# Patient Record
Sex: MALE | Race: WHITE | URBAN - METROPOLITAN AREA
[De-identification: names, ages, dates, MRNs, and addresses within clinical notes are randomized per-mention and may not be internally consistent; named-entity substitution may affect disease eponyms.]

---

## 2017-11-28 ENCOUNTER — APPOINTMENT (RX ONLY)
Dept: URBAN - METROPOLITAN AREA CLINIC 349 | Facility: CLINIC | Age: 72
Setting detail: DERMATOLOGY
End: 2017-11-28

## 2017-11-28 DIAGNOSIS — L57.0 ACTINIC KERATOSIS: ICD-10-CM

## 2017-11-28 DIAGNOSIS — D22 MELANOCYTIC NEVI: ICD-10-CM

## 2017-11-28 DIAGNOSIS — L82.0 INFLAMED SEBORRHEIC KERATOSIS: ICD-10-CM

## 2017-11-28 PROBLEM — D22.62 MELANOCYTIC NEVI OF LEFT UPPER LIMB, INCLUDING SHOULDER: Status: ACTIVE | Noted: 2017-11-28

## 2017-11-28 PROBLEM — D22.5 MELANOCYTIC NEVI OF TRUNK: Status: ACTIVE | Noted: 2017-11-28

## 2017-11-28 PROBLEM — D22.71 MELANOCYTIC NEVI OF RIGHT LOWER LIMB, INCLUDING HIP: Status: ACTIVE | Noted: 2017-11-28

## 2017-11-28 PROBLEM — D22.72 MELANOCYTIC NEVI OF LEFT LOWER LIMB, INCLUDING HIP: Status: ACTIVE | Noted: 2017-11-28

## 2017-11-28 PROBLEM — D22.61 MELANOCYTIC NEVI OF RIGHT UPPER LIMB, INCLUDING SHOULDER: Status: ACTIVE | Noted: 2017-11-28

## 2017-11-28 PROCEDURE — ? SHAVE REMOVAL

## 2017-11-28 PROCEDURE — 17110 DESTRUCTION B9 LES UP TO 14: CPT

## 2017-11-28 PROCEDURE — A4550 SURGICAL TRAYS: HCPCS

## 2017-11-28 PROCEDURE — ? COUNSELING

## 2017-11-28 PROCEDURE — ? PATHOLOGY BILLING

## 2017-11-28 PROCEDURE — ? LIQUID NITROGEN

## 2017-11-28 PROCEDURE — 17000 DESTRUCT PREMALG LESION: CPT | Mod: 59

## 2017-11-28 PROCEDURE — 11302 SHAVE SKIN LESION 1.1-2.0 CM: CPT | Mod: 59

## 2017-11-28 PROCEDURE — 17003 DESTRUCT PREMALG LES 2-14: CPT

## 2017-11-28 PROCEDURE — 88305 TISSUE EXAM BY PATHOLOGIST: CPT

## 2017-11-28 PROCEDURE — 99202 OFFICE O/P NEW SF 15 MIN: CPT | Mod: 25

## 2017-11-28 ASSESSMENT — LOCATION SIMPLE DESCRIPTION DERM
LOCATION SIMPLE: LEFT POSTERIOR THIGH
LOCATION SIMPLE: RIGHT CHEEK
LOCATION SIMPLE: RIGHT UPPER BACK
LOCATION SIMPLE: LEFT OCCIPITAL SCALP
LOCATION SIMPLE: RIGHT EAR
LOCATION SIMPLE: RIGHT POSTERIOR THIGH
LOCATION SIMPLE: LEFT SCALP
LOCATION SIMPLE: LEFT FOREARM
LOCATION SIMPLE: RIGHT FOREARM
LOCATION SIMPLE: ABDOMEN
LOCATION SIMPLE: SCALP

## 2017-11-28 ASSESSMENT — LOCATION DETAILED DESCRIPTION DERM
LOCATION DETAILED: RIGHT INFERIOR HELIX
LOCATION DETAILED: RIGHT VENTRAL PROXIMAL FOREARM
LOCATION DETAILED: RIGHT DISTAL POSTERIOR THIGH
LOCATION DETAILED: RIGHT LATERAL ABDOMEN
LOCATION DETAILED: LEFT SUPERIOR PARIETAL SCALP
LOCATION DETAILED: RIGHT SUPERIOR MEDIAL UPPER BACK
LOCATION DETAILED: LEFT MEDIAL FRONTAL SCALP
LOCATION DETAILED: RIGHT SUPERIOR PARIETAL SCALP
LOCATION DETAILED: LEFT PROXIMAL DORSAL FOREARM
LOCATION DETAILED: LEFT PROXIMAL POSTERIOR THIGH
LOCATION DETAILED: LEFT SUPERIOR OCCIPITAL SCALP
LOCATION DETAILED: RIGHT CENTRAL MALAR CHEEK
LOCATION DETAILED: RIGHT PROXIMAL DORSAL FOREARM

## 2017-11-28 ASSESSMENT — LOCATION ZONE DERM
LOCATION ZONE: LEG
LOCATION ZONE: ARM
LOCATION ZONE: SCALP
LOCATION ZONE: TRUNK
LOCATION ZONE: FACE
LOCATION ZONE: EAR

## 2017-11-28 NOTE — PROCEDURE: SHAVE REMOVAL
Accession #: TC ONLY
Notification Instructions: Patient will be notified of biopsy results. However, patient instructed to call the office if not contacted within 2 weeks.
Wound Care: Vaseline
Bill 82308 For Specimen Handling/Conveyance To Laboratory?: no
Consent was obtained from the patient. The risks and benefits to therapy were discussed in detail. Specifically, the risks of infection, scarring, bleeding, prolonged wound healing, incomplete removal, allergy to anesthesia, nerve injury and recurrence were addressed. Prior to the procedure, the treatment site was clearly identified and confirmed by the patient. All components of Universal Protocol/PAUSE Rule completed.
Billing Type: Third-Party Bill
Anesthesia Type: 1% lidocaine with epinephrine
Medical Necessity Information: It is in your best interest to select a reason for this procedure from the list below. All of these items fulfill various CMS LCD requirements except the new and changing color options.
Render Post-Care Instructions In Note?: yes
Medical Necessity Clause: This procedure was medically necessary because the lesion has a history of change
Detail Level: Detailed
Hemostasis: Aluminum Chloride
Size Of Margin In Cm (Margins Are Not Added To Billing Dimensions): -
Size Of Lesion In Cm (Required): 1.1
Post-Care Instructions: I reviewed with the patient in detail post-care instructions. Patient is to keep the biopsy site dry overnight, and then apply Vaseline daily until healed. Patient may apply hydrogen peroxide soaks to remove any crusting. After the procedure, the patient was oriented to person, place, and time. Patient denied feeling dizzy, queasy, and and declined further observation after initial 5 minute observation time.
Biopsy Method: Dermablade
X Size Of Lesion In Cm (Optional): 0
Anesthesia Volume In Cc: 0.4

## 2017-11-28 NOTE — PROCEDURE: LIQUID NITROGEN
Medical Necessity Clause: This procedure was medically necessary because the lesions that were treated were:
Include Z78.9 (Other Specified Conditions Influencing Health Status) As An Associated Diagnosis?: Yes
Post-Care Instructions: I reviewed with the patient in detail post-care instructions. Patient is to wear sunprotection, and avoid picking at any of the treated lesions. Pt may apply Vaseline to crusted or scabbing areas.
Number Of Freeze-Thaw Cycles: 2 freeze-thaw cycles
Consent: The patient's consent was obtained including but not limited to risks of crusting, scabbing, blistering, scarring, darker or lighter pigmentary change, recurrence, incomplete removal and infection.
Detail Level: Detailed
Add 52 Modifier (Optional): no
Duration Of Freeze Thaw-Cycle (Seconds): 3
Medical Necessity Information: It is in your best interest to select a reason for this procedure from the list below. All of these items fulfill various CMS LCD requirements except the new and changing color options.

## 2018-12-03 ENCOUNTER — HOSPITAL ENCOUNTER (OUTPATIENT)
Dept: LAB | Age: 73
Discharge: HOME | End: 2018-12-03

## 2018-12-03 PROCEDURE — 88305 TISSUE EXAM BY PATHOLOGIST: CPT

## 2020-02-19 ENCOUNTER — HOSPITAL ENCOUNTER (OUTPATIENT)
Dept: PHYSICAL THERAPY | Age: 75
Discharge: HOME | End: 2020-02-19
Payer: MEDICARE

## 2020-02-19 PROCEDURE — 97162 PT EVAL MOD COMPLEX 30 MIN: CPT

## 2020-02-19 PROCEDURE — 97110 THERAPEUTIC EXERCISES: CPT

## 2020-02-19 NOTE — PROGRESS NOTES
Ashley Reyes  : 1945  [place]  [place] [address]  [phone]   [phone]      OUTPATIENT PHYSICAL THERAPY: Daily Treatment Note 2020  Visit Count:  1    ICD-10: Treatment Diagnosis: Pain in right shoulder (M25.511) and Stiffness of right shoulder, not elsewhere classified (M25.611)  TREATMENT PLAN:  Effective Dates: 2020 TO 2020 (60 days). Frequency/Duration: 2 times a week for 60 Day(s)  GOALS: (Goals have been discussed and agreed upon with patient.)  Short-Term Functional Goals: Time Frame: 4 weeks  # Goal Status   1 Pt will confirm compliance with home program to facilitate improvement in function. NEW     Discharge goals: Time frame: 8 weeks   # Goal Status   1 Pt will be able to elevate UE to at least 135° without symptoms in order to participate in ADLs such as reaching overhead and pulling shirt on. NEW   2 Pt will be able to reach over head to touch superior angle of opposite scapula without symptoms to be able to participate in ADLs such as reaching overhead and washing head. NEW   3 Pt will be able to reach across body to touch spine of opposite scapula without symptoms to be able to participate in ADLs such as reaching tasks. NEW   4 Pt will be able to reach behind back to touch TLJ without symptoms to be able to participate in dressing and toileting activities. NEW   5 Pt will be able to reach behind themselves with arm elevated without symptoms to be able to participate in ADLs such as reaching tasks and grabbing seat belt. NEW       Pre-treatment Symptoms/Complaints:  Pt reports no pain at rest, just with reaching. Pain: Initial:   0/10 Post Session:  0/10   Medications Last Reviewed: 2020  Updated Objective Findings: Below measures from initial evaluation unless otherwise noted.   20  Pain at worst: 8/10  ROM: Left Right   Elevation (°) 152 112 with pain   Behind neck reach To opposite scapula To back of neck   Behind back reach To TLJ To posterior hip    Cross body reach To opposite scapula To ACJ   IR in abduction 70 50 with pain   ER in abduction 85 30 with pain     UE ANDT: All within normal limits B  Strength: 5/5 throughout L. 5/5 on R except 4/5 with ER, IR, and abduction with pain. Palpation: TTP throughout supraspinatus and infraspinatus to lateral shoulder insertion. TREATMENT:   THERAPEUTIC ACTIVITY: (see chart for minutes): Therapeutic activities per grid below to improve mobility, strength, balance and coordination. Required minimal visual, verbal and tactile cues to improve independence with functional mobility and activities. THERAPEUTIC EXERCISE: (see chart for minutes):  Exercises per grid below to improve mobility, strength, balance and coordination. Required minimal visual, verbal and tactile cues to promote proper body alignment and promote proper body mechanics. Progressed resistance, range, repetitions and complexity of movement as indicated. NEUROMUSCULAR RE-EDUCATION: (see chart for minutes):  Exercise/activities per grid below to improve balance, coordination, kinesthetic sense, posture, pain, and proprioception. Required minimal visual, verbal and tactile cues to promote motor control of right, upper extremity(s). MANUAL THERAPY: (see chart for minutes): Joint mobilization, Soft tissue mobilization, skin mobilization, and muscle energy techniques were utilized and necessary because of the patient's restricted joint motion, restricted motion of soft tissue and pain . MODALITIES: (see chart for minutes):      *  Cold Pack Therapy in order to provide analgesia. Date: 2/19/20 (visit 1)       Modalities:                Therapeutic Exercise: 23 min        Table stretch: x3 holds  S/L ER: x5 R  S/L flexion: x5 R   Pt educated on home program implementation and given printout. Neuromuscular re-education                Manual Therapy:                Therapeutic Activities:                  Home program: 2/19/20: table stretch, S/L ER and flexion    MedBridge Portal  Treatment/Session Summary:    · Response to Treatment: Pt tolerated exercises well. He reported he would try out exercises and see how they did. · Communication/Consultation:  None today  · Equipment provided today:  None today  · Recommendations/Intent for next treatment session: Next visit will focus on strengthening R shoulder and improving ROM.     Total Treatment Billable Duration:  33 minutes  PT Patient Time In/Time Out  Time In: 5951  Time Out: [address] [facility] Kely Bowers, PT, DPT    Future Appointments   Date Time Provider Arpit [facility]ails   2/21/2020 11:45 AM Alvia Clemente [facility]   2/24/2020 11:00 AM Alvia Clemente [facility]   2/27/2020 10:15 AM Alvia Clemente [facility]   3/2/2020 11:00 AM Alvia Clemente [facility]   3/4/2020 11:00 AM Alvia Clemente [facility]   3/9/2020 11:00 AM Alvia Clemente [facility]   3/11/2020 11:00 AM Alvia Clemente [facility]   3/16/2020 11:00 AM Alvia Clemente [facility]   3/18/2020 11:00 AM Alvia Clemente [facility]   3/23/2020 11:00 AM Alvia Clemente [facility]   3/25/2020 11:00 AM Alvia Clemente [facility]   3/30/2020 11:00 AM Alvia Clemente [facility]

## 2020-02-19 NOTE — THERAPY EVALUATION
Soni Hernández  : 1945  [place]  [place] [address]  [phone]   [phone]       OUTPATIENT PHYSICAL THERAPY:Initial Assessment 2020   ICD-10: Treatment Diagnosis: Pain in right shoulder (M25.511) and Stiffness of right shoulder, not elsewhere classified (M25.611)  Precautions/Allergies:   Patient has no allergy information on record. Ambulatory/Rehab Services H2 Model Falls Risk Assessment    Risk Factors:       (1)  Gender [Male] Ability to Rise from Chair:       (0)  Ability to rise in a single movement    Falls Prevention Plan:       No modifications necessary   Total: (5 or greater = High Risk): 1     [facility] [employer]. All Rights Reserved. [facility] tadoÂ° Patent #[id number]. Federal Law prohibits the replication, distribution or use without written permission from [employer]      MEDICAL/REFERRING DIAGNOSIS:  Pain in right shoulder [M25.511]   DATE OF ONSET: Over 6 months ago  REFERRING PHYSICIAN: Julius Chen MD MD Orders: evaluate and treat  Return MD Appointment: 3/10/20   INITIAL ASSESSMENT:  Mr. Janice Harding presents with impaired strength, ROM and pain of right shoulder . This limits reaching and lifting tolerance and restricts ability to participate in ADLs and functional mobility. . Patient would benefit from skilled physical therapy to address above impairments. PROBLEM LIST (Impacting functional limitations):  1. Decreased Strength  2. Decreased ADL/Functional Activities  3. Increased Pain  4. Decreased Activity Tolerance  5. Decreased Flexibility/Joint Mobility INTERVENTIONS PLANNED: (Treatment may consist of any combination of the following)  1. Cryotherapy  2. Electrical Stimulation  3. Heat  4. Home Exercise Program (HEP)  5. Manual Therapy  6. Neuromuscular Re-education/Strengthening  7.  Therapeutic Activites  8. Therapeutic Exercise/Strengthening   TREATMENT PLAN:  Effective Dates: 2/19/2020 TO 4/19/2020 (60 days). Frequency/Duration: 2 times a week for 60 Day(s)  GOALS: (Goals have been discussed and agreed upon with patient.)  Short-Term Functional Goals: Time Frame: 4 weeks  # Goal Status   1 Pt will confirm compliance with home program to facilitate improvement in function. NEW     Discharge goals: Time frame: 8 weeks   # Goal Status   1 Pt will be able to elevate UE to at least 135° without symptoms in order to participate in ADLs such as reaching overhead and pulling shirt on. NEW   2 Pt will be able to reach over head to touch superior angle of opposite scapula without symptoms to be able to participate in ADLs such as reaching overhead and washing head. NEW   3 Pt will be able to reach across body to touch spine of opposite scapula without symptoms to be able to participate in ADLs such as reaching tasks. NEW   4 Pt will be able to reach behind back to touch TLJ without symptoms to be able to participate in dressing and toileting activities. NEW   5 Pt will be able to reach behind themselves with arm elevated without symptoms to be able to participate in ADLs such as reaching tasks and grabbing seat belt. NEW       Rehabilitation Potential For Stated Goals: Good  Regarding Clint Santana's therapy, I certify that the treatment plan above will be carried out by a therapist or under their direction. Thank you for this referral,  Constantino Gilse, PT, DPT     Referring Physician Signature: Saira Amato MD _______________________________ Date _____________     HISTORY:   History of Injury/Illness (Reason for Referral):  Pt reports he has no TREY, his R shoulder just gradually started hurting. He reports it hurts with reaching overhead and behind back or head and lying on it at night. He would like to return to golfing and swimming.   Past Medical History/Comorbidities:   Mr. Joo Bronson  has no past medical history on file. Mr. Talisha Arnold  has no past surgical history on file. Hand surgery, hernia repair. Pre-diabetes. Social History/Living Environment:     Pt lives with spouse/significant other in two-story home with stairs in home and walk-in shower. Prior Level of Function/Work/Activity:  Pt does not work. Pt had unrestricted prior level of function. Current Medications:     No current outpatient medications on file. See list in paper chart. Date Last Reviewed:  2/19/2020    Number of Personal Factors/Comorbidities that affect the Plan of Care: 1-2: MODERATE COMPLEXITY   EXAMINATION:   Pain at worst: 8/10  ROM:    Left Right   Elevation (°) 152 112 with pain   Behind neck reach To opposite scapula To back of neck   Behind back reach To TLJ To posterior hip    Cross body reach To opposite scapula To ACJ   IR in abduction 70 50 with pain   ER in abduction 85 30 with pain     UE ANDT: All within normal limits B  Strength: 5/5 throughout L. 5/5 on R except 4/5 with ER, IR, and abduction with pain. Palpation: TTP throughout supraspinatus and infraspinatus to lateral shoulder insertion. Body Structures Involved:  1. Nerves  2. Joints  3. Muscles Body Functions Affected:  1. Sensory/Pain  2. Neuromusculoskeletal  3. Movement Related Activities and Participation Affected:  1. General Tasks and Demands  2. Self Care  3. Community, Social and [place]   Number of elements (examined above) that affect the Plan of Care: 3: MODERATE COMPLEXITY   CLINICAL PRESENTATION:   Presentation: Evolving clinical presentation with changing clinical characteristics: MODERATE COMPLEXITY     CLINICAL DECISION MAKING:      OUTCOME MEASURE:   Initial outcome measure performed on 2/19/2020. Tool Used: Disabilities of the Arm, Shoulder and Hand (DASH) Questionnaire - Quick Version  Score:  Initial: 27/55  Most Recent: X/55 (Date: -- )   Interpretation of Score:  The DASH is designed to measure the activities of daily living in person's with upper extremity dysfunction or pain. Each section is scored on a 1-5 scale, 5 representing the greatest disability. The scores of each section are added together for a total score of 55. MEDICAL NECESSITY:   · Patient will benefit from skilled physical therapy to address their previously listed impairments in order to improve their independence with functional activities painfree. REASON FOR SERVICES/OTHER COMMENTS:  · Patient requires present interventions due to patient's inability to perform functional and recreational activities painfree.    Use of outcome tool(s) and clinical judgement create a POC that gives a: Questionable prediction of patient's progress: MODERATE COMPLEXITY        Total Duration: 33 min  PT Patient Time In/Time Out  Time In: 1530  Time Out: [address] [place] Antonio Garcia, PT, DPT

## 2020-02-21 ENCOUNTER — HOSPITAL ENCOUNTER (OUTPATIENT)
Dept: PHYSICAL THERAPY | Age: 75
Discharge: HOME | End: 2020-02-21
Payer: MEDICARE

## 2020-02-21 PROCEDURE — 97110 THERAPEUTIC EXERCISES: CPT

## 2020-02-21 NOTE — PROGRESS NOTES
Orion Weiss  : 1945  [place]  [place] [address] - [facility] [address] @ [address], [address] [place] [address]  [phone]   [phone]      OUTPATIENT PHYSICAL THERAPY: Daily Treatment Note 2020  Visit Count:  2    ICD-10: Treatment Diagnosis: Pain in right shoulder (M25.511) and Stiffness of right shoulder, not elsewhere classified (M25.611)  TREATMENT PLAN:  Effective Dates: 2020 TO 2020 (60 days). Frequency/Duration: 2 times a week for 60 Day(s)  GOALS: (Goals have been discussed and agreed upon with patient.)  Short-Term Functional Goals: Time Frame: 4 weeks  # Goal Status   1 Pt will confirm compliance with home program to facilitate improvement in function. NEW     Discharge goals: Time frame: 8 weeks   # Goal Status   1 Pt will be able to elevate UE to at least 135° without symptoms in order to participate in ADLs such as reaching overhead and pulling shirt on. NEW   2 Pt will be able to reach over head to touch superior angle of opposite scapula without symptoms to be able to participate in ADLs such as reaching overhead and washing head. NEW   3 Pt will be able to reach across body to touch spine of opposite scapula without symptoms to be able to participate in ADLs such as reaching tasks. NEW   4 Pt will be able to reach behind back to touch TLJ without symptoms to be able to participate in dressing and toileting activities. NEW   5 Pt will be able to reach behind themselves with arm elevated without symptoms to be able to participate in ADLs such as reaching tasks and grabbing seat belt. NEW       Pre-treatment Symptoms/Complaints:  Pt reports his shoulder doesn't hurt at rest. He reports no significant change yet and that it might be a little bit better.    Pain: Initial:   0/10 Post Session:  0/10   Medications Last Reviewed: 2020  Updated Objective Findings: Below measures from initial evaluation unless otherwise noted. 2/19/20  Pain at worst: 8/10  ROM:    Left Right   Elevation (°) 152 112 with pain   Behind neck reach To opposite scapula To back of neck   Behind back reach To TLJ To posterior hip    Cross body reach To opposite scapula To ACJ   IR in abduction 70 50 with pain   ER in abduction 85 30 with pain     UE ANDT: All within normal limits B  Strength: 5/5 throughout L. 5/5 on R except 4/5 with ER, IR, and abduction with pain. Palpation: TTP throughout supraspinatus and infraspinatus to lateral shoulder insertion. TREATMENT:   THERAPEUTIC ACTIVITY: (see chart for minutes): Therapeutic activities per grid below to improve mobility, strength, balance and coordination. Required minimal visual, verbal and tactile cues to improve independence with functional mobility and activities. THERAPEUTIC EXERCISE: (see chart for minutes):  Exercises per grid below to improve mobility, strength, balance and coordination. Required minimal visual, verbal and tactile cues to promote proper body alignment and promote proper body mechanics. Progressed resistance, range, repetitions and complexity of movement as indicated. NEUROMUSCULAR RE-EDUCATION: (see chart for minutes):  Exercise/activities per grid below to improve balance, coordination, kinesthetic sense, posture, pain, and proprioception. Required minimal visual, verbal and tactile cues to promote motor control of right, upper extremity(s). MANUAL THERAPY: (see chart for minutes): Joint mobilization, Soft tissue mobilization, skin mobilization, and muscle energy techniques were utilized and necessary because of the patient's restricted joint motion, restricted motion of soft tissue and pain . MODALITIES: (see chart for minutes):      *  Cold Pack Therapy in order to provide analgesia.      Date: 2/19/20 (visit 1) 2/21/20 (viist 2)       Modalities:                Therapeutic Exercise: 23 min 41 min       Table stretch: x3 holds  S/L ER: x5 R  S/L flexion: x5 R   Pt educated on home program implementation and given printout. UBE: 3'/3' L4  S/L R ER: x10, 2x10x1#  S/L R flexion: x10, 2x10x1#  S/L R abduction: x10, 2x10x1#  Prone row, I, Y: 2x10 R each  Band ER: 3x10 red  Band IR: 3x10 grey  Band row, press, extension: 3x10 grey each R  Biceps curl: 10x2#, 10x3# R all 3  each set  Triceps extension: [id number]# R  Table stretch: x10  Wall stretch: x5, pt reported painful so discontinued       Neuromuscular re-education                Manual Therapy:                Therapeutic Activities:                  Home program: 2/19/20: table stretch, S/L ER and flexion    MedBridge Portal  Treatment/Session Summary:    · Response to Treatment: Pt tolerated exercises well. He reported some of them he could feel discomfort in shoulder but it went away after set finished. · Communication/Consultation:  None today  · Equipment provided today:  None today  · Recommendations/Intent for next treatment session: Next visit will focus on strengthening R shoulder and improving ROM.     Total Treatment Billable Duration:  41 minutes  PT Patient Time In/Time Out  Time In: 6645  Time Out: [address] [facility] [address], PT, DPT    Future Appointments   Date Time Provider Arpit Nails   2/24/2020 11:00 AM Jearl Jose [facility]   2/27/2020 10:15 AM Jearl Oz [facility]   3/2/2020 11:00 AM Jearl Oz [facility]   3/4/2020 11:00 AM Jearl Oz [facility]   3/9/2020 11:00 AM Jearl Oz [facility]   3/11/2020 11:00 AM Jearl Oz [facility]   3/16/2020 11:00 AM Jearl Oz [facility]   3/18/2020 11:00 AM Jearl Oz [facility]   3/23/2020 11:00 AM Jearl Oz [facility]   3/25/2020 11:00 AM Jearl Oz [facility]   3/30/2020 11:00 AM Jearl Oz [facility]

## 2020-02-24 ENCOUNTER — HOSPITAL ENCOUNTER (OUTPATIENT)
Dept: PHYSICAL THERAPY | Age: 75
Discharge: HOME | End: 2020-02-24
Payer: MEDICARE

## 2020-02-24 PROCEDURE — 97110 THERAPEUTIC EXERCISES: CPT

## 2020-02-24 NOTE — PROGRESS NOTES
Nina Escobar  : 1945  [place]  [place]: [place] [address] @ [address] MARIE Byrne.  [phone]   [phone]      OUTPATIENT PHYSICAL THERAPY: Daily Treatment Note 2020  Visit Count:  3    ICD-10: Treatment Diagnosis: Pain in right shoulder (M25.511) and Stiffness of right shoulder, not elsewhere classified (M25.611)  TREATMENT PLAN:  Effective Dates: 2020 TO 2020 (60 days). Frequency/Duration: 2 times a week for 60 Day(s)  GOALS: (Goals have been discussed and agreed upon with patient.)  Short-Term Functional Goals: Time Frame: 4 weeks  # Goal Status   1 Pt will confirm compliance with home program to facilitate improvement in function. NEW     Discharge goals: Time frame: 8 weeks   # Goal Status   1 Pt will be able to elevate UE to at least 135° without symptoms in order to participate in ADLs such as reaching overhead and pulling shirt on. NEW   2 Pt will be able to reach over head to touch superior angle of opposite scapula without symptoms to be able to participate in ADLs such as reaching overhead and washing head. NEW   3 Pt will be able to reach across body to touch spine of opposite scapula without symptoms to be able to participate in ADLs such as reaching tasks. NEW   4 Pt will be able to reach behind back to touch TLJ without symptoms to be able to participate in dressing and toileting activities. NEW   5 Pt will be able to reach behind themselves with arm elevated without symptoms to be able to participate in ADLs such as reaching tasks and grabbing seat belt. NEW       Pre-treatment Symptoms/Complaints:  Pt reports no pain at rest, but pain with certain reaching motions still.    Pain: Initial:   0/10 Post Session:  0/10   Medications Last Reviewed: 2020  Updated Objective Findings: Below measures from initial evaluation unless otherwise noted.  2/19/20  Pain at worst: 8/10  ROM:    Left Right   Elevation (°) 152 112 with pain   Behind neck reach To opposite scapula To back of neck   Behind back reach To TLJ To posterior hip    Cross body reach To opposite scapula To ACJ   IR in abduction 70 50 with pain   ER in abduction 85 30 with pain     UE ANDT: All within normal limits B  Strength: 5/5 throughout L. 5/5 on R except 4/5 with ER, IR, and abduction with pain. Palpation: TTP throughout supraspinatus and infraspinatus to lateral shoulder insertion. TREATMENT:   THERAPEUTIC ACTIVITY: (see chart for minutes): Therapeutic activities per grid below to improve mobility, strength, balance and coordination. Required minimal visual, verbal and tactile cues to improve independence with functional mobility and activities. THERAPEUTIC EXERCISE: (see chart for minutes):  Exercises per grid below to improve mobility, strength, balance and coordination. Required minimal visual, verbal and tactile cues to promote proper body alignment and promote proper body mechanics. Progressed resistance, range, repetitions and complexity of movement as indicated. NEUROMUSCULAR RE-EDUCATION: (see chart for minutes):  Exercise/activities per grid below to improve balance, coordination, kinesthetic sense, posture, pain, and proprioception. Required minimal visual, verbal and tactile cues to promote motor control of right, upper extremity(s). MANUAL THERAPY: (see chart for minutes): Joint mobilization, Soft tissue mobilization, skin mobilization, and muscle energy techniques were utilized and necessary because of the patient's restricted joint motion, restricted motion of soft tissue and pain . MODALITIES: (see chart for minutes):      *  Cold Pack Therapy in order to provide analgesia.      Date: 2/19/20 (visit 1) 2/21/20 (viist 2)  2/24/20 (visit 3)      Modalities:                Therapeutic Exercise: 23 min 41 min 45 min      Table stretch: x3 holds  S/L ER: x5 R  S/L flexion: x5 R   Pt educated on home program implementation and given printout. UBE: 3'/3' L4  S/L R ER: x10, 2x10x1#  S/L R flexion: x10, 2x10x1#  S/L R abduction: x10, 2x10x1#  Prone row, I, Y: 2x10 R each  Band ER: 3x10 red  Band IR: 3x10 grey  Band row, press, extension: 3x10 grey each R  Biceps curl: 10x2#, 10x3# R all 3  each set  Triceps extension: 8l26s16# R  Table stretch: x10  Wall stretch: x5, pt reported painful so discontinued  UBE: 3'/3' L4  S/L R ER: 10x1#, 2x10x2#  S/L R flexion: 10x1#, 2x10x2#  S/L R abduction: 10x1#, 2x10x2#  Prone T, Y: 3x10 R each  Band ER: 3x10 red  Band IR, row, extension: 3x10 grey  Biceps curl: 10x3#, 2x10x4# B all 3  each set  Triceps extension: 3t79d36# B  PROM stretching into IR, ER in adduction and abduction: 5'      Neuromuscular re-education                Manual Therapy:                Therapeutic Activities:                  Home program: 2/19/20: table stretch, S/L ER and flexion    [employer] Portal  Treatment/Session Summary:    · Response to Treatment: Pt tolerated increased volume and resistance well today. He continues to have pain with reaching motions that take him behind body or into end range ER. · Communication/Consultation:  None today  · Equipment provided today:  None today  · Recommendations/Intent for next treatment session: Next visit will focus on strengthening R shoulder and improving ROM.     Total Treatment Billable Duration:  45 minutes  PT Patient Time In/Time Out  Time In: 1100  Time Out: 18[address] [facility] Thierry Cantu PT, DPT    Future Appointments   Date Time Provider Arpit Nails   2/27/2020 10:15 AM Elijah Urbina [facility]   3/2/2020 11:00 AM Elijah FARRELL [facility]   3/4/2020 11:00 AM Elijah FARRELL [facility]   3/9/2020 11:00 AM Elijah FARRELL [facility]   3/11/2020 11:00 AM Elijah [facility]   3/16/2020 11:00 AM Elijah [facility]LL [facility]   3/18/2020 11:00 AM Elijah [facility]LL [facility]   3/23/2020 11:00 AM Magalys Denis [facility]   3/25/2020 11:00 AM Magalys Denis [facility]   3/30/2020 11:00 AM Magalys Denis [facility]

## 2020-02-27 ENCOUNTER — HOSPITAL ENCOUNTER (OUTPATIENT)
Dept: PHYSICAL THERAPY | Age: 75
Discharge: HOME | End: 2020-02-27
Payer: MEDICARE

## 2020-02-27 PROCEDURE — 97110 THERAPEUTIC EXERCISES: CPT

## 2020-02-27 NOTE — PROGRESS NOTES
Nina Escobar  : 1945  [place]  [place] [address] @ [address], [place] Byrne.  [phone]   [phone]      OUTPATIENT PHYSICAL THERAPY: Daily Treatment Note 2020  Visit Count:  4    ICD-10: Treatment Diagnosis: Pain in right shoulder (M25.511) and Stiffness of right shoulder, not elsewhere classified (M25.611)  TREATMENT PLAN:  Effective Dates: 2020 TO 2020 (60 days). Frequency/Duration: 2 times a week for 60 Day(s)  GOALS: (Goals have been discussed and agreed upon with patient.)  Short-Term Functional Goals: Time Frame: 4 weeks  # Goal Status   1 Pt will confirm compliance with home program to facilitate improvement in function. NEW     Discharge goals: Time frame: 8 weeks   # Goal Status   1 Pt will be able to elevate UE to at least 135° without symptoms in order to participate in ADLs such as reaching overhead and pulling shirt on. NEW   2 Pt will be able to reach over head to touch superior angle of opposite scapula without symptoms to be able to participate in ADLs such as reaching overhead and washing head. NEW   3 Pt will be able to reach across body to touch spine of opposite scapula without symptoms to be able to participate in ADLs such as reaching tasks. NEW   4 Pt will be able to reach behind back to touch TLJ without symptoms to be able to participate in dressing and toileting activities. NEW   5 Pt will be able to reach behind themselves with arm elevated without symptoms to be able to participate in ADLs such as reaching tasks and grabbing seat belt. NEW       Pre-treatment Symptoms/Complaints:  Pt reports his shoulder doesn't hurt at rest but any reaching behind hurts. He reports he is sleeping slightly better now.    Pain: Initial:   0/10 Post Session:  0/10   Medications Last Reviewed: 2020  Updated Objective Findings: Below measures from initial evaluation unless otherwise noted. 2/19/20  Pain at worst: 8/10  ROM:    Left Right   Elevation (°) 152 112 with pain   Behind neck reach To opposite scapula To back of neck   Behind back reach To TLJ To posterior hip    Cross body reach To opposite scapula To ACJ   IR in abduction 70 50 with pain   ER in abduction 85 30 with pain     UE ANDT: All within normal limits B  Strength: 5/5 throughout L. 5/5 on R except 4/5 with ER, IR, and abduction with pain. Palpation: TTP throughout supraspinatus and infraspinatus to lateral shoulder insertion. TREATMENT:   THERAPEUTIC ACTIVITY: (see chart for minutes): Therapeutic activities per grid below to improve mobility, strength, balance and coordination. Required minimal visual, verbal and tactile cues to improve independence with functional mobility and activities. THERAPEUTIC EXERCISE: (see chart for minutes):  Exercises per grid below to improve mobility, strength, balance and coordination. Required minimal visual, verbal and tactile cues to promote proper body alignment and promote proper body mechanics. Progressed resistance, range, repetitions and complexity of movement as indicated. NEUROMUSCULAR RE-EDUCATION: (see chart for minutes):  Exercise/activities per grid below to improve balance, coordination, kinesthetic sense, posture, pain, and proprioception. Required minimal visual, verbal and tactile cues to promote motor control of right, upper extremity(s). MANUAL THERAPY: (see chart for minutes): Joint mobilization, Soft tissue mobilization, skin mobilization, and muscle energy techniques were utilized and necessary because of the patient's restricted joint motion, restricted motion of soft tissue and pain . MODALITIES: (see chart for minutes):      *  Cold Pack Therapy in order to provide analgesia.      Date: 2/19/20 (visit 1) 2/21/20 (viist 2)  2/24/20 (visit 3)  2/27/20 (visit 4)     Modalities:                Therapeutic Exercise: 23 min 41 min 45 min 45 min     Table stretch: x3 holds  S/L ER: x5 R  S/L flexion: x5 R   Pt educated on home program implementation and given printout. UBE: 3'/3' L4  S/L R ER: x10, 2x10x1#  S/L R flexion: x10, 2x10x1#  S/L R abduction: x10, 2x10x1#  Prone row, I, Y: 2x10 R each  Band ER: 3x10 red  Band IR: 3x10 grey  Band row, press, extension: 3x10 grey each R  Biceps curl: 10x2#, 10x3# R all 3  each set  Triceps extension: 3l31a35# R  Table stretch: x10  Wall stretch: x5, pt reported painful so discontinued  UBE: 3'/3' L4  S/L R ER: 10x1#, 2x10x2#  S/L R flexion: 10x1#, 2x10x2#  S/L R abduction: 10x1#, 2x10x2#  Prone T, Y: 3x10 R each  Band ER: 3x10 red  Band IR, row, extension: 3x10 grey  Biceps curl: 10x3#, 2x10x4# B all 3  each set  Triceps extension: 0o29w35# B  PROM stretching into IR, ER in adduction and abduction: 5'  UBE: 3'/3' L4  S/L R ER: 3x10x2#  S/L R flexion: 3x10x2#  S/L R abduction: 3x10x2#  Prone T, Y: 2x10 R each  Band ER: 3x10 red  Band IR, row, extension: 3x10 grey  Wall wash: 3x10  Flexion to 90 °: 3x10x1# B  Biceps curl: 2x10x4# B all 3  each set  Triceps extension: 2w53q31# B  D2 flexion: 2x5 red B  D2 extension: 2x5x15# B     Neuromuscular re-education                Manual Therapy:                Therapeutic Activities:                  Home program: 2/19/20: table stretch, S/L ER and flexion    [employer] Portal  Treatment/Session Summary:    · Response to Treatment: Pt continues to progress well and tolerated new exercises well. · Communication/Consultation:  None today  · Equipment provided today:  None today  · Recommendations/Intent for next treatment session: Next visit will focus on strengthening R shoulder and improving ROM.     Total Treatment Billable Duration:  45 minutes  PT Patient Time In/Time Out  Time In: 1015  Time Out: [address], PT, DPT    Future Appointments   Date Time Provider Arpit Nails   3/2/2020 11:00 AM Srikanth Dorman [facility]   3/4/2020 11:00 AM Alexandro Offer [facility]   3/9/2020 11:00 AM Alexandro Offer [facility]   3/11/2020 11:00 AM Alexandro Offer [facility]   3/16/2020 11:00 AM Alexandro Offer [facility]   3/18/2020 11:00 AM Alexandro Offer [facility]   3/23/2020 11:00 AM Alexandro Offer [facility]   3/25/2020 11:00 AM Alexandro Offer [facility]   3/30/2020 11:00 AM Alexandro Offer [facility]

## 2020-03-02 ENCOUNTER — HOSPITAL ENCOUNTER (OUTPATIENT)
Dept: PHYSICAL THERAPY | Age: 75
Discharge: HOME | End: 2020-03-02
Payer: MEDICARE

## 2020-03-02 PROCEDURE — 97110 THERAPEUTIC EXERCISES: CPT

## 2020-03-02 NOTE — PROGRESS NOTES
Robin Chawla  : 1945  [place]  [place] [address]  [phone]   [phone]      OUTPATIENT PHYSICAL THERAPY: Daily Treatment Note 3/2/2020  Visit Count:  5    ICD-10: Treatment Diagnosis: Pain in right shoulder (M25.511) and Stiffness of right shoulder, not elsewhere classified (M25.611)  TREATMENT PLAN:  Effective Dates: 2020 TO 2020 (60 days). Frequency/Duration: 2 times a week for 60 Day(s)  GOALS: (Goals have been discussed and agreed upon with patient.)  Short-Term Functional Goals: Time Frame: 4 weeks  # Goal Status   1 Pt will confirm compliance with home program to facilitate improvement in function. NEW     Discharge goals: Time frame: 8 weeks   # Goal Status   1 Pt will be able to elevate UE to at least 135° without symptoms in order to participate in ADLs such as reaching overhead and pulling shirt on. NEW   2 Pt will be able to reach over head to touch superior angle of opposite scapula without symptoms to be able to participate in ADLs such as reaching overhead and washing head. NEW   3 Pt will be able to reach across body to touch spine of opposite scapula without symptoms to be able to participate in ADLs such as reaching tasks. NEW   4 Pt will be able to reach behind back to touch TLJ without symptoms to be able to participate in dressing and toileting activities. NEW   5 Pt will be able to reach behind themselves with arm elevated without symptoms to be able to participate in ADLs such as reaching tasks and grabbing seat belt. NEW       Pre-treatment Symptoms/Complaints:  Pt reports reaching forward generally doesn't give him trouble but reaching behind body is still painful.    Pain: Initial:   0/10 Post Session:  0/10   Medications Last Reviewed: 3/2/2020  Updated Objective Findings: Below measures from initial evaluation unless otherwise noted. 2/19/20  Pain at worst: 8/10  ROM:    Left Right   Elevation (°) 152 112 with pain   Behind neck reach To opposite scapula To back of neck   Behind back reach To TLJ To posterior hip    Cross body reach To opposite scapula To ACJ   IR in abduction 70 50 with pain   ER in abduction 85 30 with pain     UE ANDT: All within normal limits B  Strength: 5/5 throughout L. 5/5 on R except 4/5 with ER, IR, and abduction with pain. Palpation: TTP throughout supraspinatus and infraspinatus to lateral shoulder insertion. TREATMENT:   THERAPEUTIC ACTIVITY: (see chart for minutes): Therapeutic activities per grid below to improve mobility, strength, balance and coordination. Required minimal visual, verbal and tactile cues to improve independence with functional mobility and activities. THERAPEUTIC EXERCISE: (see chart for minutes):  Exercises per grid below to improve mobility, strength, balance and coordination. Required minimal visual, verbal and tactile cues to promote proper body alignment and promote proper body mechanics. Progressed resistance, range, repetitions and complexity of movement as indicated. NEUROMUSCULAR RE-EDUCATION: (see chart for minutes):  Exercise/activities per grid below to improve balance, coordination, kinesthetic sense, posture, pain, and proprioception. Required minimal visual, verbal and tactile cues to promote motor control of right, upper extremity(s). MANUAL THERAPY: (see chart for minutes): Joint mobilization, Soft tissue mobilization, skin mobilization, and muscle energy techniques were utilized and necessary because of the patient's restricted joint motion, restricted motion of soft tissue and pain . MODALITIES: (see chart for minutes):      *  Cold Pack Therapy in order to provide analgesia.      Date: 2/19/20 (visit 1) 2/21/20 (viist 2)  2/24/20 (visit 3)  2/27/20 (visit 4)  3/2/20 (visit 5)    Modalities:                Therapeutic Exercise: 23 min 41 min 45 min 45 min 40 min    Table stretch: x3 holds  S/L ER: x5 R  S/L flexion: x5 R   Pt educated on home program implementation and given printout. UBE: 3'/3' L4  S/L R ER: x10, 2x10x1#  S/L R flexion: x10, 2x10x1#  S/L R abduction: x10, 2x10x1#  Prone row, I, Y: 2x10 R each  Band ER: 3x10 red  Band IR: 3x10 grey  Band row, press, extension: 3x10 grey each R  Biceps curl: 10x2#, 10x3# R all 3  each set  Triceps extension: 0i98b96# R  Table stretch: x10  Wall stretch: x5, pt reported painful so discontinued  UBE: 3'/3' L4  S/L R ER: 10x1#, 2x10x2#  S/L R flexion: 10x1#, 2x10x2#  S/L R abduction: 10x1#, 2x10x2#  Prone T, Y: 3x10 R each  Band ER: 3x10 red  Band IR, row, extension: 3x10 grey  Biceps curl: 10x3#, 2x10x4# B all 3  each set  Triceps extension: 7r69d06# B  PROM stretching into IR, ER in adduction and abduction: 5'  UBE: 3'/3' L4  S/L R ER: 3x10x2#  S/L R flexion: 3x10x2#  S/L R abduction: 3x10x2#  Prone T, Y: 2x10 R each  Band ER: 3x10 red  Band IR, row, extension: 3x10 grey  Wall wash: 3x10  Flexion to 90 °: 3x10x1# B  Biceps curl: 2x10x4# B all 3  each set  Triceps extension: 3l03t58# B  D2 flexion: 2x5 red B  D2 extension: 2x5x15# B  UBE: 3'/3' L4  Band abduction: 3x10 green  Band ER: 3x10 green  Band IR: 3x10 grey  Band row: 2n00u34#  Flexion to 90 °: 3x10x1# B  Biceps curl: 3x10x4# B all 3  each set  Triceps extension: 8l84s25# B  D2 flexion: 3x8 blue B  D2 extension: 5f27a74# B   Neuromuscular re-education                Manual Therapy:                Therapeutic Activities:                  Home program: 2/19/20: table stretch, S/L ER and flexion    [employer] Portal  Treatment/Session Summary:    · Response to Treatment: Pt tolerated exercises well and reported he felt like he had worked out. He is to see MD tomorrow and was educated to cancel PT if he is given injection to allow it time without exercise.   · Communication/Consultation:  None today  · Equipment provided today:  None today  · Recommendations/Intent for next treatment session: Next visit will focus on strengthening R shoulder and improving ROM.     Total Treatment Billable Duration:  40 minutes  PT Patient Time In/Time Out  Time In: 1100  Time Out: [address] [facility] Vickie Escobar, PT, DPT    Future Appointments   Date Time Provider Arpit Nails   3/4/2020 11:00 AM Darcie Kaufman [facility]   3/9/2020 11:00 [facility]   3/11/2020 11:00 AM Darcie [facility]aufman [facility]   3/16/2020 11:00 AM [facility]   3/18/2020 11:00 AM [facility]   3/23/2020 11:00 AM [facility]   3/25/2020 11:00 AM [facility]   3[facility]30/2020 11:00 AM [facility]

## 2020-03-04 ENCOUNTER — HOSPITAL ENCOUNTER (OUTPATIENT)
Dept: PHYSICAL THERAPY | Age: 75
Discharge: HOME | End: 2020-03-04
Payer: MEDICARE

## 2020-03-04 PROCEDURE — 97110 THERAPEUTIC EXERCISES: CPT

## 2020-03-04 NOTE — PROGRESS NOTES
Naren Becerril  : 1945  [place]  [place]: [place] [address] @ [address], [address] [place] [address]  [phone]   [phone]      OUTPATIENT PHYSICAL THERAPY: Daily Treatment Note 3/4/2020  Visit Count:  6    ICD-10: Treatment Diagnosis: Pain in right shoulder (M25.511) and Stiffness of right shoulder, not elsewhere classified (M25.611)  TREATMENT PLAN:  Effective Dates: 2020 TO 2020 (60 days). Frequency/Duration: 2 times a week for 60 Day(s)  GOALS: (Goals have been discussed and agreed upon with patient.)  Short-Term Functional Goals: Time Frame: 4 weeks  # Goal Status   1 Pt will confirm compliance with home program to facilitate improvement in function. NEW     Discharge goals: Time frame: 8 weeks   # Goal Status   1 Pt will be able to elevate UE to at least 135° without symptoms in order to participate in ADLs such as reaching overhead and pulling shirt on. NEW   2 Pt will be able to reach over head to touch superior angle of opposite scapula without symptoms to be able to participate in ADLs such as reaching overhead and washing head. NEW   3 Pt will be able to reach across body to touch spine of opposite scapula without symptoms to be able to participate in ADLs such as reaching tasks. NEW   4 Pt will be able to reach behind back to touch TLJ without symptoms to be able to participate in dressing and toileting activities. NEW   5 Pt will be able to reach behind themselves with arm elevated without symptoms to be able to participate in ADLs such as reaching tasks and grabbing seat belt. NEW     Next Follow-up: 20    Pre-treatment Symptoms/Complaints:  Pt reports he saw MD yesterday who told him to continue with PT until his next follow-up on 20.  Pt reports he has to leave at 11:25 today due to a .   Pain: Initial:   0/10 Post Session:  0/10   Medications Last Reviewed: 3/4/2020  Updated Objective Findings: Below measures from initial evaluation unless otherwise noted. 2/19/20  Pain at worst: 8/10  ROM:    Left Right   Elevation (°) 152 112 with pain   Behind neck reach To opposite scapula To back of neck   Behind back reach To TLJ To posterior hip    Cross body reach To opposite scapula To ACJ   IR in abduction 70 50 with pain   ER in abduction 85 30 with pain     UE ANDT: All within normal limits B  Strength: 5/5 throughout L. 5/5 on R except 4/5 with ER, IR, and abduction with pain. Palpation: TTP throughout supraspinatus and infraspinatus to lateral shoulder insertion. TREATMENT:   THERAPEUTIC ACTIVITY: (see chart for minutes): Therapeutic activities per grid below to improve mobility, strength, balance and coordination. Required minimal visual, verbal and tactile cues to improve independence with functional mobility and activities. THERAPEUTIC EXERCISE: (see chart for minutes):  Exercises per grid below to improve mobility, strength, balance and coordination. Required minimal visual, verbal and tactile cues to promote proper body alignment and promote proper body mechanics. Progressed resistance, range, repetitions and complexity of movement as indicated. NEUROMUSCULAR RE-EDUCATION: (see chart for minutes):  Exercise/activities per grid below to improve balance, coordination, kinesthetic sense, posture, pain, and proprioception. Required minimal visual, verbal and tactile cues to promote motor control of right, upper extremity(s). MANUAL THERAPY: (see chart for minutes): Joint mobilization, Soft tissue mobilization, skin mobilization, and muscle energy techniques were utilized and necessary because of the patient's restricted joint motion, restricted motion of soft tissue and pain . MODALITIES: (see chart for minutes):      *  Cold Pack Therapy in order to provide analgesia.      Date: 3/2/20 (visit 5)  3/4/20 (visit 6)       Modalities: Therapeutic Exercise: 40 min 25 min       UBE: 3'/3' L4  Band abduction: 3x10 green  Band ER: 3x10 green  Band IR: 3x10 grey  Band row: 5l25h46#  Flexion to 90 °: 3x10x1# B  Biceps curl: 3x10x4# B all 3  each set  Triceps extension: 3y23m67# B  D2 flexion: 3x8 blue B  D2 extension: 1g15w10# B UBE: 3'/3' L4  D2 flexion: 2x10 red B  D2 extension: 1w52h03# B  Band ER: 3x10 red  Band IR: 3x10 grey  Band row: 6l30f02#  Flexion to 90 °: 2x10x1# B  Biceps curl: x10x4# B all 3  each set  Triceps extension: x10x15# B      Neuromuscular re-education                Manual Therapy:                Therapeutic Activities:                  Home program: 2/19/20: table stretch, S/L ER and flexion    Med[facility] Portal  Treatment/Session Summary:    · Response to Treatment: Pt reported no pain after exercises and ~2/10 pain on R with exercises. He tolerated exercises well and had a shortened session today due to having to leave. · Communication/Consultation:  None today  · Equipment provided today:  None today  · Recommendations/Intent for next treatment session: Next visit will focus on strengthening R shoulder and improving ROM.     Total Treatment Billable Duration:  25 minutes  PT Patient Time In/Time Out  Time In: 1100  Time Out: [address] Shirley Caicedo PT, DPT    Future Appointments   Date Time Provider Arpit Nails   3/9/2020 11:00 AM Andrez Rocker [facility]   3/11/2020 11:00 [facility]   3/16/2020 11:00 AM Maguire Rocker [facility]   3/18/2020 11:00 AM Maguire Rocker [facility]   3/23/2020 11:00 AM Maguire Rocker [facility]   3/25/2020 11:00 AM [facility]   3/30/2020 11:00 AM [facility]

## 2020-03-09 ENCOUNTER — HOSPITAL ENCOUNTER (OUTPATIENT)
Dept: PHYSICAL THERAPY | Age: 75
Discharge: HOME | End: 2020-03-09
Payer: MEDICARE

## 2020-03-09 PROCEDURE — 97110 THERAPEUTIC EXERCISES: CPT

## 2020-03-09 NOTE — PROGRESS NOTES
Gabriel Cos  : 1945  [place]  [place] [address] [facility] [address]  [phone]   [phone]      OUTPATIENT PHYSICAL THERAPY: Daily Treatment Note 3/9/2020  Visit Count:  7    ICD-10: Treatment Diagnosis: Pain in right shoulder (M25.511) and Stiffness of right shoulder, not elsewhere classified (M25.611)  TREATMENT PLAN:  Effective Dates: 2020 TO 2020 (60 days). Frequency/Duration: 2 times a week for 60 Day(s)  GOALS: (Goals have been discussed and agreed upon with patient.)  Short-Term Functional Goals: Time Frame: 4 weeks  # Goal Status   1 Pt will confirm compliance with home program to facilitate improvement in function. NEW     Discharge goals: Time frame: 8 weeks   # Goal Status   1 Pt will be able to elevate UE to at least 135° without symptoms in order to participate in ADLs such as reaching overhead and pulling shirt on. NEW   2 Pt will be able to reach over head to touch superior angle of opposite scapula without symptoms to be able to participate in ADLs such as reaching overhead and washing head. NEW   3 Pt will be able to reach across body to touch spine of opposite scapula without symptoms to be able to participate in ADLs such as reaching tasks. NEW   4 Pt will be able to reach behind back to touch TLJ without symptoms to be able to participate in dressing and toileting activities. NEW   5 Pt will be able to reach behind themselves with arm elevated without symptoms to be able to participate in ADLs such as reaching tasks and grabbing seat belt. NEW     Next Follow-up: 20    Pre-treatment Symptoms/Complaints:  Pt reports he noticed increased discomfort with putting on jacket recently. He reports that physical therapy has helped him sleep better. Pt arrived 7 minutes late.    Pain: Initial:   0/10 Post Session:  0/10 but tender    Medications Last Reviewed: 3/9/2020  Updated Objective Findings: Below measures from initial evaluation unless otherwise noted. 2/19/20  Pain at worst: 8/10  ROM:    Left Right   Elevation (°) 152 112 with pain   Behind neck reach To opposite scapula To back of neck   Behind back reach To TLJ To posterior hip    Cross body reach To opposite scapula To ACJ   IR in abduction 70 50 with pain   ER in abduction 85 30 with pain     UE ANDT: All within normal limits B  Strength: 5/5 throughout L. 5/5 on R except 4/5 with ER, IR, and abduction with pain. Palpation: TTP throughout supraspinatus and infraspinatus to lateral shoulder insertion. TREATMENT:   THERAPEUTIC ACTIVITY: (see chart for minutes): Therapeutic activities per grid below to improve mobility, strength, balance and coordination. Required minimal visual, verbal and tactile cues to improve independence with functional mobility and activities. THERAPEUTIC EXERCISE: (see chart for minutes):  Exercises per grid below to improve mobility, strength, balance and coordination. Required minimal visual, verbal and tactile cues to promote proper body alignment and promote proper body mechanics. Progressed resistance, range, repetitions and complexity of movement as indicated. NEUROMUSCULAR RE-EDUCATION: (see chart for minutes):  Exercise/activities per grid below to improve balance, coordination, kinesthetic sense, posture, pain, and proprioception. Required minimal visual, verbal and tactile cues to promote motor control of right, upper extremity(s). MANUAL THERAPY: (see chart for minutes): Joint mobilization, Soft tissue mobilization, skin mobilization, and muscle energy techniques were utilized and necessary because of the patient's restricted joint motion, restricted motion of soft tissue and pain . MODALITIES: (see chart for minutes):      *  Cold Pack Therapy in order to provide analgesia.      Date: 3/2/20 (visit 5)  3/4/20 (visit 6)  3/9/20 (visit 7) Modalities:                Therapeutic Exercise: 40 min 25 min 38 min      UBE: 3'/3' L4  Band abduction: 3x10 green  Band ER: 3x10 green  Band IR: 3x10 grey  Band row: [id number]#  Flexion to 90 °: 3x10x1# B  Biceps curl: 3x10x4# B all 3  each set  Triceps extension: 0f68y98# B  D2 flexion: 3x8 blue B  D2 extension: 1b95s18# B UBE: 3'/3' L4  D2 flexion: 2x10 red B  D2 extension: 0w55g28# B  Band ER: 3x10 red  Band IR: 3x10 grey  Band row: 7o83g67#  Flexion to 90 °: 2x10x1# B  Biceps curl: x10x4# B all 3  each set  Triceps extension: x10x15# B UBE: 3'/3' L4  D2 flexion: 2x10 yellow R, 2x10 red using both arms  Band ER: 3x10 red  Band IR: 4a49n86#  Cable row: 1d30d06#  Band pull-apart: 2x10 red  Biceps curl: 3x10x5# B all 3  each set  Triceps extension: 8f40k72# B     Neuromuscular re-education                Manual Therapy:                Therapeutic Activities:                  Home program: 2/19/20: table stretch, S/L ER and flexion    Med[facility] Portal  Treatment/Session Summary:    · Response to Treatment: Pt reported band pull-apart most difficulty and painful exercise. This exercise was introduced gradually and will assess response next visit. Pt cancelled 3/18/20 visit due to having cataract surgery. · Communication/Consultation:  None today  · Equipment provided today:  None today  · Recommendations/Intent for next treatment session: Next visit will focus on strengthening R shoulder and improving ROM.      Total Treatment Billable Duration:  38 minutes  PT Patient Time In/Time Out  Time In: 1107  Time Out: [address] [facility] Aliyah Lea, PT, DPT    Future Appointments   Date Time Provider Arpit Nails   3/11/2020 11:00 AM Quan Nany [facility]   3/16/2020 11:00 AM Quan Nany [facility]   3/18/2020  7:00 PM Quan Nany [facility]   3/23/2020 11:00 AM Quan Nany [facility]   3/25/2020 11:00 [facility]   3/30/2020 11:00 AM [facility]yon [facility]

## 2020-03-11 ENCOUNTER — HOSPITAL ENCOUNTER (OUTPATIENT)
Dept: PHYSICAL THERAPY | Age: 75
Discharge: HOME | End: 2020-03-11
Payer: MEDICARE

## 2020-03-11 PROCEDURE — 97110 THERAPEUTIC EXERCISES: CPT

## 2020-03-11 NOTE — PROGRESS NOTES
Chucky Tri  : 1945  [place]  [place] [address] @ [address]  [phone]   [phone]      OUTPATIENT PHYSICAL THERAPY: Daily Treatment Note 3/11/2020  Visit Count:  8    ICD-10: Treatment Diagnosis: Pain in right shoulder (M25.511) and Stiffness of right shoulder, not elsewhere classified (M25.611)  TREATMENT PLAN:  Effective Dates: 2020 TO 2020 (60 days). Frequency/Duration: 2 times a week for 60 Day(s)  GOALS: (Goals have been discussed and agreed upon with patient.)  Short-Term Functional Goals: Time Frame: 4 weeks  # Goal Status   1 Pt will confirm compliance with home program to facilitate improvement in function. NEW     Discharge goals: Time frame: 8 weeks   # Goal Status   1 Pt will be able to elevate UE to at least 135° without symptoms in order to participate in ADLs such as reaching overhead and pulling shirt on. NEW   2 Pt will be able to reach over head to touch superior angle of opposite scapula without symptoms to be able to participate in ADLs such as reaching overhead and washing head. NEW   3 Pt will be able to reach across body to touch spine of opposite scapula without symptoms to be able to participate in ADLs such as reaching tasks. NEW   4 Pt will be able to reach behind back to touch TLJ without symptoms to be able to participate in dressing and toileting activities. NEW   5 Pt will be able to reach behind themselves with arm elevated without symptoms to be able to participate in ADLs such as reaching tasks and grabbing seat belt. NEW     Next Follow-up: 20    Pre-treatment Symptoms/Complaints:  Pt reports his shoulder mostly just hurts if he reaches behind his body, otherwise it is okay.    Pain: Initial:   0/10 Post Session:  0/10    Medications Last Reviewed: 3/11/2020  Updated Objective Findings: Below measures from initial evaluation unless otherwise noted. 2/19/20  Pain at worst: 8/10  ROM:    Left Right   Elevation (°) 152 112 with pain   Behind neck reach To opposite scapula To back of neck   Behind back reach To TLJ To posterior hip    Cross body reach To opposite scapula To ACJ   IR in abduction 70 50 with pain   ER in abduction 85 30 with pain     UE ANDT: All within normal limits B  Strength: 5/5 throughout L. 5/5 on R except 4/5 with ER, IR, and abduction with pain. Palpation: TTP throughout supraspinatus and infraspinatus to lateral shoulder insertion. TREATMENT:   THERAPEUTIC ACTIVITY: (see chart for minutes): Therapeutic activities per grid below to improve mobility, strength, balance and coordination. Required minimal visual, verbal and tactile cues to improve independence with functional mobility and activities. THERAPEUTIC EXERCISE: (see chart for minutes):  Exercises per grid below to improve mobility, strength, balance and coordination. Required minimal visual, verbal and tactile cues to promote proper body alignment and promote proper body mechanics. Progressed resistance, range, repetitions and complexity of movement as indicated. NEUROMUSCULAR RE-EDUCATION: (see chart for minutes):  Exercise/activities per grid below to improve balance, coordination, kinesthetic sense, posture, pain, and proprioception. Required minimal visual, verbal and tactile cues to promote motor control of right, upper extremity(s). MANUAL THERAPY: (see chart for minutes): Joint mobilization, Soft tissue mobilization, skin mobilization, and muscle energy techniques were utilized and necessary because of the patient's restricted joint motion, restricted motion of soft tissue and pain . MODALITIES: (see chart for minutes):      *  Cold Pack Therapy in order to provide analgesia.      Date: 3/2/20 (visit 5)  3/4/20 (visit 6)  3/9/20 (visit 7)  3/11/20 (visit 8)     Modalities:                Therapeutic Exercise: 40 min 25 min 38 min 40 min     UBE: 3'/3' L4  Band abduction: 3x10 green  Band ER: 3x10 green  Band IR: 3x10 grey  Band row: 2x96v84#  Flexion to 90 °: 3x10x1# B  Biceps curl: 3x10x4# B all 3  each set  Triceps extension: 6j11j74# B  D2 flexion: 3x8 blue B  D2 extension: 3p08m11# B UBE: 3'/3' L4  D2 flexion: 2x10 red B  D2 extension: 1h22x35# B  Band ER: 3x10 red  Band IR: 3x10 grey  Band row: 6s04b60#  Flexion to 90 °: 2x10x1# B  Biceps curl: x10x4# B all 3  each set  Triceps extension: x10x15# B UBE: 3'/3' L4  D2 flexion: 2x10 yellow R, 2x10 red using both arms  Band ER: 3x10 red  Band IR: 6j89c41#  Cable row: 6l14f11#  Band pull-apart: 2x10 red  Biceps curl: 3x10x5# B all 3  each set  Triceps extension: 1u93w90# B UBE: 3'/3' L4  D2 flexion: 3x10 yellow B, 3x10 B red using both arms  D2 extension: 8l74i07#  Band ER: 2x10 red  Band IR: 1t32c16#  Cable row: 9k12g65#  Band pull-apart: 2x10 red  Biceps curl: 10x5# B all 3  each set  Triceps extension: 10x15# B    Neuromuscular re-education                Manual Therapy:                Therapeutic Activities:                  Home program: 2/19/20: table stretch, S/L ER and flexion    [facility]  Treatment/Session Summary:    · Response to Treatment: Pt tolerated exercises well and did well with increased volume of elevation exercises. · Communication/Consultation:  None today  · Equipment provided today:  None today  · Recommendations/Intent for next treatment session: Next visit will focus on strengthening R shoulder and improving ROM.      Total Treatment Billable Duration:  40 minutes  PT Patient Time In/Time Out  Time In: 1100  Time Out: [address] [facility] Ty Shahbaz, PT, DPT    Future Appointments   Date Time Provider Arpit Nails   3/16/2020 11:00 AM Michael Holcomb [facility]   3/18/2020  7:00 PM Michael Holcomb [facility]   3/23/2020 11:00 AM [facility] Holcomb [facility]   3/25/2020 11:00 AM [facility]   3/30/2020 11:00 AM Michael Holcomb [facility]   4/1/2020 11:00 AM [place] Amen [facility]   4/6/2020 11:00 AM [place] Amen [facility]   4/8/2020 11:00 AM Dionisio Amen [facility]   4/13/2020 11:00 AM Dionisio Amen [facility]   4/15/2020 11:00 AM Dionisio Amen [facility]

## 2020-03-16 ENCOUNTER — HOSPITAL ENCOUNTER (OUTPATIENT)
Dept: PHYSICAL THERAPY | Age: 75
Discharge: HOME | End: 2020-03-16
Payer: MEDICARE

## 2020-03-16 PROCEDURE — 97110 THERAPEUTIC EXERCISES: CPT

## 2020-03-16 NOTE — PROGRESS NOTES
Robin Chawla  : 1945  [place]  [place] [address]  [phone]   [phone]      OUTPATIENT PHYSICAL THERAPY: Daily Treatment Note 3/16/2020  Visit Count:  9    ICD-10: Treatment Diagnosis: Pain in right shoulder (M25.511) and Stiffness of right shoulder, not elsewhere classified (M25.611)  TREATMENT PLAN:  Effective Dates: 2020 TO 2020 (60 days). Frequency/Duration: 2 times a week for 60 Day(s)  GOALS: (Goals have been discussed and agreed upon with patient.)  Short-Term Functional Goals: Time Frame: 4 weeks  # Goal Status   1 Pt will confirm compliance with home program to facilitate improvement in function. NEW     Discharge goals: Time frame: 8 weeks   # Goal Status   1 Pt will be able to elevate UE to at least 135° without symptoms in order to participate in ADLs such as reaching overhead and pulling shirt on. NEW   2 Pt will be able to reach over head to touch superior angle of opposite scapula without symptoms to be able to participate in ADLs such as reaching overhead and washing head. NEW   3 Pt will be able to reach across body to touch spine of opposite scapula without symptoms to be able to participate in ADLs such as reaching tasks. NEW   4 Pt will be able to reach behind back to touch TLJ without symptoms to be able to participate in dressing and toileting activities. NEW   5 Pt will be able to reach behind themselves with arm elevated without symptoms to be able to participate in ADLs such as reaching tasks and grabbing seat belt. NEW     Next Follow-up: 20    Pre-treatment Symptoms/Complaints:  Pt reports his shoulder does not hurt at rest but every now and then he will do a reaching motion and report it is painful. He will be seen only 1 time this week due to having cataract surgery this week.    Pain: Initial:   0/10 Post Session:  0/10    Medications Last Reviewed: 3/16/2020  Updated Objective Findings: Below measures from initial evaluation unless otherwise noted. 2/19/20  Pain at worst: 8/10  ROM:    Left Right   Elevation (°) 152 112 with pain   Behind neck reach To opposite scapula To back of neck   Behind back reach To TLJ To posterior hip    Cross body reach To opposite scapula To ACJ   IR in abduction 70 50 with pain   ER in abduction 85 30 with pain     UE ANDT: All within normal limits B  Strength: 5/5 throughout L. 5/5 on R except 4/5 with ER, IR, and abduction with pain. Palpation: TTP throughout supraspinatus and infraspinatus to lateral shoulder insertion. TREATMENT:   THERAPEUTIC ACTIVITY: (see chart for minutes): Therapeutic activities per grid below to improve mobility, strength, balance and coordination. Required minimal visual, verbal and tactile cues to improve independence with functional mobility and activities. THERAPEUTIC EXERCISE: (see chart for minutes):  Exercises per grid below to improve mobility, strength, balance and coordination. Required minimal visual, verbal and tactile cues to promote proper body alignment and promote proper body mechanics. Progressed resistance, range, repetitions and complexity of movement as indicated. NEUROMUSCULAR RE-EDUCATION: (see chart for minutes):  Exercise/activities per grid below to improve balance, coordination, kinesthetic sense, posture, pain, and proprioception. Required minimal visual, verbal and tactile cues to promote motor control of right, upper extremity(s). MANUAL THERAPY: (see chart for minutes): Joint mobilization, Soft tissue mobilization, skin mobilization, and muscle energy techniques were utilized and necessary because of the patient's restricted joint motion, restricted motion of soft tissue and pain . MODALITIES: (see chart for minutes):      *  Cold Pack Therapy in order to provide analgesia.      Date: 3/2/20 (visit 5)  3/4/20 (visit 6)  3/9/20 (visit 7)  3/11/20 (visit 8)  3/16/20 (visit 9)    Modalities:                Therapeutic Exercise: 40 min 25 min 38 min 40 min 40 min    UBE: 3'/3' L4  Band abduction: 3x10 green  Band ER: 3x10 green  Band IR: 3x10 grey  Band row: 6k81k56#  Flexion to 90 °: 3x10x1# B  Biceps curl: 3x10x4# B all 3  each set  Triceps extension: 3e19c59# B  D2 flexion: 3x8 blue B  D2 extension: 1g76u12# B UBE: 3'/3' L4  D2 flexion: 2x10 red B  D2 extension: 1i49n36# B  Band ER: 3x10 red  Band IR: 3x10 grey  Band row: 8w03u80#  Flexion to 90 °: 2x10x1# B  Biceps curl: x10x4# B all 3  each set  Triceps extension: x10x15# B UBE: 3'/3' L4  D2 flexion: 2x10 yellow R, 2x10 red using both arms  Band ER: 3x10 red  Band IR: 3r32a74#  Cable row: 7g51e31#  Band pull-apart: 2x10 red  Biceps curl: 3x10x5# B all 3  each set  Triceps extension: 3w07y76# B UBE: 3'/3' L4  D2 flexion: 3x10 yellow B, 3x10 B red using both arms  D2 extension: 4n10b35#  Band ER: 2x10 red  Band IR: 3v96z73#  Cable row: 3w16p63#  Band pull-apart: 2x10 red  Biceps curl: 10x5# B all 3  each set  Triceps extension: 10x15# B UBE: 3'/3' L4  D2 flexion: 3x10 yellow B, 3x10 B red using both arms  D2 extension: 6v26q63#  Band ER: 2x10 red  Band IR: 8u32y54#  Band pull-apart: 2x10 blue  Biceps curl: 2x10x5# B all 3  each set  Triceps extension: 7o04q58# B   Neuromuscular re-education                Manual Therapy:                Therapeutic Activities:                  Home program: 2/19/20: table stretch, S/L ER and flexion    MedBridge Portal  Treatment/Session Summary:    · Response to Treatment: Pt tolerated exercises well and reported D2 flexion was easier. He still felt discomfort at top of motion but reported it was better than before. · Communication/Consultation:  None today  · Equipment provided today:  None today  · Recommendations/Intent for next treatment session: Next visit will focus on strengthening R shoulder and improving ROM. Pt will be seen 1 time this week due to cataract surgery, plan to resume normal schedule next week.      Total Treatment Billable Duration:  40 minutes  PT Patient Time In/Time Out  Time In: 1100  Time Out: [address] [facility] Karen Meyer, PT, DPT    Future Appointments   Date Time Provider [place]pit [place]   3/18/2020  7:00 PM Srikanth Lakia [facility]   3/23/2020 11:00 AM Srikanth Lakia [facility]   3/25/2020 11:00 AM Srikanth Lakia [facility]   3/30/2020 11:00 AM Srikanth Lakia [facility]   4/1/2020 11:00 AM Srikanth Lakia [facility]   4/6/2020 11:00 AM Srikanth Lakia [facility]   4/8/2020 11:00 AM Srikanth Lakia [facility]   4/13/2020 11:00 AM Srikanth Lakia [facility]   4/15/2020 11:00 AM Srikanth Lakia [facility]

## 2020-03-19 NOTE — PROGRESS NOTES
Chucky Mercadoethel  : 1945  [place]  [place] [address] [facility] [address] @ [address]  [phone]   [phone]      OUTPATIENT PHYSICAL THERAPY: Discharge Note 3/19/20  ICD-10: Treatment Diagnosis: Pain in right shoulder (M25.511) and Stiffness of right shoulder, not elsewhere classified (M25.611)  TREATMENT PLAN:  Effective Dates: 2020 TO 2020 (60 days). Frequency/Duration: 2 times a week for 60 Day(s)  GOALS: (Goals have been discussed and agreed upon with patient.)  Short-Term Functional Goals: Time Frame: 4 weeks  # Goal Status   1 Pt will confirm compliance with home program to facilitate improvement in function. UNABLE TO ASSESS     Discharge goals: Time frame: 8 weeks   # Goal Status   1 Pt will be able to elevate UE to at least 135° without symptoms in order to participate in ADLs such as reaching overhead and pulling shirt on. UNABLE TO ASSESS   2 Pt will be able to reach over head to touch superior angle of opposite scapula without symptoms to be able to participate in ADLs such as reaching overhead and washing head. UNABLE TO ASSESS   3 Pt will be able to reach across body to touch spine of opposite scapula without symptoms to be able to participate in ADLs such as reaching tasks. UNABLE TO ASSESS   4 Pt will be able to reach behind back to touch TLJ without symptoms to be able to participate in dressing and toileting activities. UNABLE TO ASSESS   5 Pt will be able to reach behind themselves with arm elevated without symptoms to be able to participate in ADLs such as reaching tasks and grabbing seat belt. UNABLE TO ASSESS     Spoke to pt on the phone and explained that clinic has been mandated to be closed due to COVID-10 precautions. Educated pt that their appointments would be cancelled and they would be discharged during this time.  Explained that clinic would call them again once open to arrange to have them come back for physical therapy. Home program was clarified for pt. Pt verbalized understanding.      Constantino Giles, PT, DPT

## 2020-03-23 ENCOUNTER — APPOINTMENT (OUTPATIENT)
Dept: PHYSICAL THERAPY | Age: 75
End: 2020-03-23
Payer: MEDICARE

## 2020-03-25 ENCOUNTER — APPOINTMENT (OUTPATIENT)
Dept: PHYSICAL THERAPY | Age: 75
End: 2020-03-25
Payer: MEDICARE

## 2020-03-30 ENCOUNTER — APPOINTMENT (OUTPATIENT)
Dept: PHYSICAL THERAPY | Age: 75
End: 2020-03-30
Payer: MEDICARE

## 2020-04-01 ENCOUNTER — APPOINTMENT (OUTPATIENT)
Dept: PHYSICAL THERAPY | Age: 75
End: 2020-04-01

## 2020-04-06 ENCOUNTER — APPOINTMENT (OUTPATIENT)
Dept: PHYSICAL THERAPY | Age: 75
End: 2020-04-06

## 2020-04-08 ENCOUNTER — APPOINTMENT (OUTPATIENT)
Dept: PHYSICAL THERAPY | Age: 75
End: 2020-04-08

## 2020-04-13 ENCOUNTER — APPOINTMENT (OUTPATIENT)
Dept: PHYSICAL THERAPY | Age: 75
End: 2020-04-13

## 2020-04-15 ENCOUNTER — APPOINTMENT (OUTPATIENT)
Dept: PHYSICAL THERAPY | Age: 75
End: 2020-04-15

## 2020-05-13 VITALS — HEIGHT: 73 IN | BODY MASS INDEX: 29.29 KG/M2 | WEIGHT: 221 LBS

## 2020-05-13 RX ORDER — CHOLECALCIFEROL (VITAMIN D3) 50 MCG
1 CAPSULE ORAL DAILY
COMMUNITY

## 2020-05-13 NOTE — PERIOP NOTES
The patient and family have been mailed the information provided by [facility] in regards to resuming surgery during COVID-19. The patient and family have been informed of [facility] procedures to minimize COVID-19 related risk, but that elimination of risk is not possible. The patient and family have been informed of the visitation policy during their surgery - that one visitor is to wait outside during the surgery and Dr. Herman Singletary will provide them with an update and postoperative instructions. The patient has been advised to maintain the stay at home order for two weeks prior and two weeks after surgery. The patient and family have been educated to monitor symptoms such as fever, cough (dry or productive), shortness of breath, difficulty breathing, sore throat, laryngitis, headache, fatigue, unexplained soreness, diarrhea, nausea and sudden loss of taste or smell. They have been advised if the patient develops any of these symptoms, they are to contact our office to review and possibly reschedule their surgery. This was discussed via the phone with the patient on 5/11/20.

## 2020-05-13 NOTE — PERIOP NOTES
Patient verified name and . Order for consent not found in EHR. Type 3 surgery, PAT phone assessment complete. Orders not received. Labs per surgeon: no orders received. MRSA nasal swab per protocol. Labs per anesthesia protocol: CBC and BMP- pt to have done on 20. Type and Screen DOS. EKG: Done 20- within anesthesia guidelines. Pt instructed to come for lab work on 20 from 8:30 AM- 4:00 PM. Pt instructed that he would have a green bracelet placed on non operative arm by lab and that he is not to remove bracelet prior to surgery. Pt voiced an understanding. Patient answered medical/surgical history questions at their best of ability. All prior to admission medications documented in [facility]. Patient instructed to take the following medications the day of surgery according to anesthesia guidelines with a small sip of water: lipitor and synthroid. Hold all vitamins/supplements 7 days prior to surgery and NSAIDS 5 days prior to surgery. Patient instructed on the following:  >A negative Covid swab result is required to proceed with surgery; the swab will be collected 4-5 days prior to surgery at the [address] [place] [address] at [address] [place] [address] Street. The patient will be contacted by the Covid swab team for an appointment date and time. For questions or concerns the patient should call [phone]. >No visitors at this time; patients will be dropped off at entrance. >Arrive at [facility], time of arrival to be called the day before by 1700  >NPO after midnight including gum, mints, and ice chips  >Responsible adult must drive patient to the hospital, stay during surgery, and patient will need supervision 24 hours after anesthesia  >Use Dynahex in shower the night before surgery and on the morning of surgery  >All piercings must be removed prior to arrival.    >Leave all valuables (money and jewelry) at home but bring insurance card and ID on  DOS.    >Do not wear make-up, nail polish, lotions, cologne, perfumes, powders, or oil on skin. Patient teach back successful and patient demonstrates knowledge of instruction. Pt will be given dynahex/instructions, med sheet and pt guide to surgery at lab appointment on 5/14/20.

## 2020-05-13 NOTE — PERIOP NOTES
PLEASE CONTINUE TAKING ALL PRESCRIPTION MEDICATIONS UP TO THE DAY OF SURGERY UNLESS OTHERWISE DIRECTED BELOW. DISCONTINUE all vitamins and supplements 7 days prior to surgery. DISCONTINUE Non-Steriodal Anti-Inflammatory (NSAIDS) such as Advil and Aleve 5 days prior to surgery. Home Medications to take  the day of surgery   Atorvastatin, Levothyroxine            Home Medications   to Hold   Vitamins and supplements for 7 days         Comments   Come for lab work on 5/20/20 from 8:30 AM- 4:00 PM. A green bracelet placed on non operative arm by lab, Do Not Remove. *Visitor policy- No visitors        Please do not bring home medications with you on the day of surgery unless otherwise directed by your nurse. If you are instructed to bring home medications, please give them to your nurse as they will be administered by the nursing staff. If you have any questions, please call Prisca Bhagat [phone]. A copy of this note was provided to the patient for reference.

## 2020-05-14 ENCOUNTER — HOSPITAL ENCOUNTER (OUTPATIENT)
Dept: SURGERY | Age: 75
Discharge: HOME | End: 2020-05-14
Attending: ORTHOPAEDIC SURGERY
Payer: MEDICARE

## 2020-05-14 LAB
ALBUMIN SERPL-MCNC: 3.5 G/DL (ref 3.2–4.6)
ALBUMIN/GLOB SERPL: 1 {RATIO} (ref 1.2–3.5)
ALP SERPL-CCNC: 64 U/L (ref 50–136)
ALT SERPL-CCNC: 26 U/L (ref 12–65)
ANION GAP SERPL CALC-SCNC: 7 MMOL/L (ref 7–16)
APPEARANCE UR: CLEAR
APTT PPP: 31.7 SEC (ref 24.3–35.4)
AST SERPL-CCNC: 22 U/L (ref 15–37)
BACTERIA SPEC CULT: NORMAL
BILIRUB SERPL-MCNC: 0.6 MG/DL (ref 0.2–1.1)
BILIRUB UR QL: NEGATIVE
BUN SERPL-MCNC: 60 MG/DL (ref 8–23)
CALCIUM SERPL-MCNC: 9.2 MG/DL (ref 8.3–10.4)
CHLORIDE SERPL-SCNC: 108 MMOL/L (ref 98–107)
CO2 SERPL-SCNC: 21 MMOL/L (ref 21–32)
COLOR UR: YELLOW
CREAT SERPL-MCNC: 2.5 MG/DL (ref 0.8–1.5)
ERYTHROCYTE [DISTWIDTH] IN BLOOD BY AUTOMATED COUNT: 13.5 % (ref 11.9–14.6)
GLOBULIN SER CALC-MCNC: 3.4 G/DL (ref 2.3–3.5)
GLUCOSE SERPL-MCNC: 114 MG/DL (ref 65–100)
GLUCOSE UR STRIP.AUTO-MCNC: NEGATIVE MG/DL
HCT VFR BLD AUTO: 37.9 % (ref 41.1–50.3)
HGB BLD-MCNC: 12.6 G/DL (ref 13.6–17.2)
HGB UR QL STRIP: NEGATIVE
INR PPP: 0.9
KETONES UR QL STRIP.AUTO: NEGATIVE MG/DL
LEUKOCYTE ESTERASE UR QL STRIP.AUTO: NEGATIVE
MAGNESIUM SERPL-MCNC: 2.3 MG/DL (ref 1.8–2.4)
MCH RBC QN AUTO: 32.5 PG (ref 26.1–32.9)
MCHC RBC AUTO-ENTMCNC: 33.2 G/DL (ref 31.4–35)
MCV RBC AUTO: 97.7 FL (ref 79.6–97.8)
NITRITE UR QL STRIP.AUTO: NEGATIVE
NRBC # BLD: 0 K/UL (ref 0–0.2)
PH UR STRIP: 5 [PH] (ref 5–9)
PLATELET # BLD AUTO: 216 K/UL (ref 150–450)
PMV BLD AUTO: 10.9 FL (ref 9.4–12.3)
POTASSIUM SERPL-SCNC: 4.6 MMOL/L (ref 3.5–5.1)
PROT SERPL-MCNC: 6.9 G/DL (ref 6.3–8.2)
PROT UR STRIP-MCNC: NEGATIVE MG/DL
PROTHROMBIN TIME: 12.3 SEC (ref 12–14.7)
RBC # BLD AUTO: 3.88 M/UL (ref 4.23–5.6)
SERVICE CMNT-IMP: NORMAL
SODIUM SERPL-SCNC: 136 MMOL/L (ref 136–145)
SP GR UR REFRACTOMETRY: 1.01 (ref 1–1.02)
UROBILINOGEN UR QL STRIP.AUTO: 0.2 EU/DL (ref 0.2–1)
WBC # BLD AUTO: 6.6 K/UL (ref 4.3–11.1)

## 2020-05-14 PROCEDURE — 81003 URINALYSIS AUTO W/O SCOPE: CPT

## 2020-05-14 PROCEDURE — 87641 MR-STAPH DNA AMP PROBE: CPT

## 2020-05-14 PROCEDURE — 85610 PROTHROMBIN TIME: CPT

## 2020-05-14 PROCEDURE — 85027 COMPLETE CBC AUTOMATED: CPT

## 2020-05-14 PROCEDURE — 80053 COMPREHEN METABOLIC PANEL: CPT

## 2020-05-14 PROCEDURE — 36415 COLL VENOUS BLD VENIPUNCTURE: CPT

## 2020-05-14 PROCEDURE — 85730 THROMBOPLASTIN TIME PARTIAL: CPT

## 2020-05-14 PROCEDURE — 83735 ASSAY OF MAGNESIUM: CPT

## 2020-05-14 NOTE — ADVANCED PRACTICE NURSE
Total Joint Surgery Preoperative Chart Review      Patient ID:  Kanu Motley  [id number]  67 y.o.  1945  Surgeon: Dr. Aldridge Precise  Date of Surgery: 5/21/2020  Procedure: Total Right Shoulder Arthroplasty  Primary Care Physician: Timur Peterson [place] [phone]  Specialty Physician(s):      Subjective:   Kanu Motley is a 77 y.o. WHITE OR  male who presents for preoperative evaluation for Total Right Shoulder arthroplasty. This is a preoperative chart review note based on data collected by the nurse at the surgical Pre-Assessment visit. Past Medical History:   Diagnosis Date    Arthritis     Carotid artery disease ([facility]     Left emdartectomy in  1/2019, Followed by vascular     Congenital absence of one kidney     Has left kidney     Diabetes ([facility] [address]     Type 2, Oral meds, Average BS- 110-130, Last A1C 6.3 on 5/12/20, denies hypoglycemia     Essential hypertension     Managed with meds     Hyperlipidemia     Thyroid disease     Hypothyroid       Past Surgical History:   Procedure Laterality Date    HX CAROTID ENDARTERECTOMY  01/28/2019    Left CEA    HX CATARACT REMOVAL Bilateral     HX COLONOSCOPY      HX HERNIA REPAIR      HX ORTHOPAEDIC  2009    Left hand surgery      History reviewed. No pertinent family history. Social History     Tobacco Use    Smoking status: Never Smoker    Smokeless tobacco: Never Used   Substance Use Topics    Alcohol use: Yes     Frequency: Never     Comment: seldom        Prior to Admission medications    Medication Sig Start Date End Date Taking? Authorizing Provider   omega 3-dha-epa-fish oil (Fish Oil) 100-160-1,000 mg cap Take 1 Tab by mouth daily. Yes Provider, Historical   metFORMIN (GLUCOPHAGE) 500 mg tablet Take 500 mg by mouth two (2) times a day.    Yes Provider, Historical   pioglitazone (ACTOS) 45 mg tablet TAKE 1 TABLET BY MOUTH ONCE DAILY 1/29/20  Yes Provider, Historical   losartan (COZAAR) 100 mg tablet TAKE 1 TABLET BY MOUTH ONCE DAILY IN THE MORNING 4/22/20  Yes Provider, Historical   hydroCHLOROthiazide (MICROZIDE) 12.5 mg capsule TAKE 1 CAPSULE BY MOUTH ONCE DAILY 3/12/20  Yes Provider, Historical   aspirin 81 mg chewable tablet Take 81 mg by mouth nightly. 1/29/19  Yes Provider, Historical   atorvastatin (LIPITOR) 80 mg tablet TAKE 1 TABLET BY MOUTH ONCE DAILY 3/16/20  Yes Provider, Historical   levothyroxine (SYNTHROID) 100 mcg tablet TAKE 1 TABLET BY MOUTH ONCE DAILY IN THE MORNING 4/17/20  Yes Provider, Historical   coenzyme Q-10 (CO Q-10) 200 mg capsule Take 200 mg by mouth daily. Yes Provider, Historical   multivitamin (ONE A DAY) tablet Take 1 Tab by mouth daily. Yes Provider, Historical   flaxseed oil (OMEGA 3 PO) Take 1 Tab by mouth daily. Yes Provider, Historical     No Known Allergies       Objective:     Physical Exam:   No data found. ECG:    EKG Results     None          Data Review:   Labs:         Problem List:  )There is no problem list on file for this patient. Total Joint Surgery Pre-Assessment Recommendations:           Recommend continuous saturation monitoring during hospitalization. PEP therapy BID.         Signed By: ELISE Gifford    May 14, 2020

## 2020-05-15 PROBLEM — M75.121 COMPLETE TEAR OF RIGHT ROTATOR CUFF: Status: ACTIVE | Noted: 2020-05-15

## 2020-05-15 PROBLEM — M19.011 OSTEOARTHRITIS OF RIGHT GLENOHUMERAL JOINT: Status: ACTIVE | Noted: 2020-05-15

## 2020-05-15 NOTE — BRIEF OP NOTE
BRIEF OPERATIVE NOTE    Date of Procedure: 5/21/2020     Preoperative Diagnosis:  GLENOHUMERAL OSTEOARTHRITIS RIGHT SHOULDER      ROTATOR CUFF TEAR RIGHT SHOULDER    Postoperative Diagnosis:  SAME    Procedure(s): REVERSE RIGHT TOTAL SHOULDER ARTHROPLASTY WITH DELTA EXTEND PROSTHESIS, BICEPS TENODESIS    Surgeon(s) and Role:     * Gail Alex MD - Primary         Assistant Staff:  Leyda White NP      Surgical Staff:  Circ-1: (Unknown)  Scrub Tech-1: (Unknown)  Scrub Tech-2: (Unknown)  Scrub Tech-3: (Unknown)  No case tracking events are documented in the log. Anesthesia:  GENERAL WITH INTERSCALENE BLOCK    Estimated Blood Loss: 200 cc. Complications: NONE    Implants:   Implant Name Type Inv.  Item Serial No.  Lot No. LRB No. Used Action   COMP METAGLENE LNG PEG +10MM -- DELTA XTEND - [id number]  COMP METAGLENE LNG PEG +10MM -- DELTA XTEND [id number] [facility] [employer]TH[employer] [id number] Right 1 Implanted   COMPNT GLENOSPHRE ECC D42MM +4 -- DELTA XTEND - [id number]  COMPNT GLENOSPHRE ECC D42MM +4 -- DELTA XTEND [id number] [facility] ORTHOP[employer] [id number] Right 1 Implanted   SCR BNE CRTX ST 4.5X40MM SS --  - [id number]  SCR BNE CRTX ST 4.5X40MM SS --  [id number] [employer] [place] [id number] Right 1 Implanted   SCR BNE CRTX ST 4.5X50MM SS --  - [id number]  SCR BNE CRTX ST 4.5X50MM SS --  [id number] [employer] [place] [id number] Right 1 Implanted   SCR BNE CRTX ST 4.5X24MM SS --  - [id number]  SCR BNE CRTX ST 4.5X24MM SS --  [id number] [employer] [place] [id number] Right 1 Implanted   SIZE 14 129MM STEM    [employer] [id number] Right 1 Implanted   REVERSE FX EPI    [employer] [id number] Right 1 Implanted   CUP HUM STD DELT PE 42+9MM -- Justice Ferrerr - [id number]  CUP HUM STD DELT PE 42+9MM -- [employer] [id number] Right 1 Implanted       Maria Luisa Emmanuel MD

## 2020-05-15 NOTE — H&P
[facility] HISTORY AND PHYSICAL    Subjective:     Patient is a 77 y.o. RHD MALE WITH RIGHT SHOULDER PAIN. SEE OFFICE NOTE. Patient Active Problem List    Diagnosis Date Noted    Osteoarthritis of right glenohumeral joint 05/15/2020    Complete tear of right rotator cuff 05/15/2020     Past Medical History:   Diagnosis Date    Arthritis     [facility]otid artery disease ([facility] [facility]     Left emdartectomy in  1/2019, Followed by vascular     Congenital absence of one kidney     Has left kidney. PCP follows - no nephrologist.  pt states baseline creat 2.2-2.4    Diabetes ([facility] [address]     Type 2, Oral meds, Average BS- 110-130, Last A1C 6.3 on 5/12/20, denies hypoglycemia     Essential hypertension     Managed with meds     Hyperlipidemia     Thyroid disease     Hypothyroid       Past Surgical History:   Procedure Laterality Date    HX CAROTID ENDARTERECTOMY  01/28/2019    Left CEA    HX CATARACT REMOVAL Bilateral     HX COLONOSCOPY      HX HERNIA REPAIR      HX ORTHOPAEDIC  2009    Left hand surgery       Prior to Admission medications    Medication Sig Start Date End Date Taking? Authorizing Provider   omega 3-dha-epa-fish oil (Fish Oil) 100-160-1,000 mg cap Take 1 Tab by mouth daily. Provider, Historical   metFORMIN (GLUCOPHAGE) 500 mg tablet Take 500 mg by mouth two (2) times a day. Provider, Historical   pioglitazone (ACTOS) 45 mg tablet TAKE 1 TABLET BY MOUTH ONCE DAILY 1/29/20   Provider, Historical   losartan (COZAAR) 100 mg tablet TAKE 1 TABLET BY MOUTH ONCE DAILY IN THE MORNING 4/22/20   Provider, Historical   hydroCHLOROthiazide (MICROZIDE) 12.5 mg capsule TAKE 1 CAPSULE BY MOUTH ONCE DAILY 3/12/20   Provider, Historical   aspirin 81 mg chewable tablet Take 81 mg by mouth nightly.  1/29/19   Provider, Historical   atorvastatin (LIPITOR) 80 mg tablet TAKE 1 TABLET BY MOUTH ONCE DAILY 3/16/20   Provider, Historical   levothyroxine (SYNTHROID) 100 mcg tablet TAKE 1 TABLET BY MOUTH ONCE DAILY IN THE MORNING 4/17/20   Provider, Historical   coenzyme Q-10 (CO Q-10) 200 mg capsule Take 200 mg by mouth daily. Provider, Historical   multivitamin (ONE A DAY) tablet Take 1 Tab by mouth daily. Provider, Historical   flaxseed oil (OMEGA 3 PO) Take 1 Tab by mouth daily. Provider, Historical     No Known Allergies   Social History     Tobacco Use    Smoking status: Never Smoker    Smokeless tobacco: Never Used   Substance Use Topics    Alcohol use: Yes     Frequency: Never     Comment: seldom       No family history on file. Review of Systems  A comprehensive review of systems was negative except for that written in the HPI. Objective:     No data found. There were no vitals taken for this visit. General:  Alert, cooperative, no distress, appears stated age. Head:  Normocephalic, without obvious abnormality, atraumatic. Back:   Symmetric, no curvature. ROM normal. No CVA tenderness. Lungs:   Clear to auscultation bilaterally. Chest wall:  No tenderness or deformity. Heart:  Regular rate and rhythm, S1, S2 normal, no murmur, click, rub or gallop. Extremities: Extremities normal, atraumatic, no cyanosis or edema. Pulses: 2+ and symmetric all extremities. Skin: Skin color, texture, turgor normal. No rashes or lesions   Lymph nodes: Cervical, supraclavicular, and axillary nodes normal.   Neurologic: CNII-XII intact. Normal strength, sensation and reflexes throughout. Assessment:     Principal Problem:    Osteoarthritis of right glenohumeral joint (5/15/2020)    Active Problems:    Complete tear of right rotator cuff (5/15/2020)        Plan:     The various methods of treatment have been discussed with the patient and family. PATIENT HAS EXHAUSTED NON-OPERATIVE MODALITIES     After consideration of risks, benefits and other options for treatment, the patient has consented to surgical intervention.     SEE OFFICE NOTE    THERESE TOM Emani Allen MD

## 2020-05-15 NOTE — PERIOP NOTES
Requested most recent PCP note and labs from Estefani Esquivel [place] [phone], to comparison to labs done 5/14/20. Call to pt - states he does not have nephrologist but was born with 1 kidney. States PCP follows kidney function and his baseline creat is 2.2-2.4. Will have anesthesia review.

## 2020-05-20 ENCOUNTER — HOSPITAL ENCOUNTER (OUTPATIENT)
Dept: LAB | Age: 75
Discharge: HOME | DRG: 483 | End: 2020-05-20
Attending: ORTHOPAEDIC SURGERY
Payer: MEDICARE

## 2020-05-20 ENCOUNTER — ANESTHESIA EVENT (OUTPATIENT)
Dept: SURGERY | Age: 75
DRG: 483 | End: 2020-05-20
Payer: MEDICARE

## 2020-05-20 PROCEDURE — 86923 COMPATIBILITY TEST ELECTRIC: CPT

## 2020-05-20 PROCEDURE — 86900 BLOOD TYPING SEROLOGIC ABO: CPT

## 2020-05-21 ENCOUNTER — APPOINTMENT (OUTPATIENT)
Dept: GENERAL RADIOLOGY | Age: 75
DRG: 483 | End: 2020-05-21
Attending: ORTHOPAEDIC SURGERY
Payer: MEDICARE

## 2020-05-21 ENCOUNTER — HOSPITAL ENCOUNTER (INPATIENT)
Age: 75
LOS: 2 days | Discharge: HOME | DRG: 483 | End: 2020-05-23
Attending: ORTHOPAEDIC SURGERY | Admitting: ORTHOPAEDIC SURGERY
Payer: MEDICARE

## 2020-05-21 ENCOUNTER — HOME HEALTH ADMISSION (OUTPATIENT)
Dept: HOME HEALTH SERVICES | Facility: HOME HEALTH | Age: 75
End: 2020-05-21
Payer: MEDICARE

## 2020-05-21 ENCOUNTER — ANESTHESIA (OUTPATIENT)
Dept: SURGERY | Age: 75
DRG: 483 | End: 2020-05-21
Payer: MEDICARE

## 2020-05-21 LAB
EST. AVERAGE GLUCOSE BLD GHB EST-MCNC: 146 MG/DL
GLUCOSE BLD STRIP.AUTO-MCNC: 150 MG/DL (ref 65–100)
HBA1C MFR BLD: 6.7 %

## 2020-05-21 PROCEDURE — 77030003827 HC BIT DRL J&J -B: Performed by: ORTHOPAEDIC SURGERY

## 2020-05-21 PROCEDURE — 77030013708 HC HNDPC SUC IRR PULS STRY –B: Performed by: ORTHOPAEDIC SURGERY

## 2020-05-21 PROCEDURE — 74011250636 HC RX REV CODE- 250/636: Performed by: ANESTHESIOLOGY

## 2020-05-21 PROCEDURE — 76060000034 HC ANESTHESIA 1.5 TO 2 HR: Performed by: ORTHOPAEDIC SURGERY

## 2020-05-21 PROCEDURE — C1713 ANCHOR/SCREW BN/BN,TIS/BN: HCPCS | Performed by: ORTHOPAEDIC SURGERY

## 2020-05-21 PROCEDURE — 76010000162 HC OR TIME 1.5 TO 2 HR INTENSV-TIER 1: Performed by: ORTHOPAEDIC SURGERY

## 2020-05-21 PROCEDURE — 76942 ECHO GUIDE FOR BIOPSY: CPT | Performed by: ORTHOPAEDIC SURGERY

## 2020-05-21 PROCEDURE — 76210000006 HC OR PH I REC 0.5 TO 1 HR: Performed by: ORTHOPAEDIC SURGERY

## 2020-05-21 PROCEDURE — 77030020268 HC MISC GENERAL SUPPLY: Performed by: ORTHOPAEDIC SURGERY

## 2020-05-21 PROCEDURE — 77030040361 HC SLV COMPR DVT MDII -B

## 2020-05-21 PROCEDURE — 74011250636 HC RX REV CODE- 250/636: Performed by: NURSE PRACTITIONER

## 2020-05-21 PROCEDURE — 94762 N-INVAS EAR/PLS OXIMTRY CONT: CPT

## 2020-05-21 PROCEDURE — 74011250637 HC RX REV CODE- 250/637: Performed by: NURSE PRACTITIONER

## 2020-05-21 PROCEDURE — 74011250636 HC RX REV CODE- 250/636: Performed by: NURSE ANESTHETIST, CERTIFIED REGISTERED

## 2020-05-21 PROCEDURE — 77030037088 HC TUBE ENDOTRACH ORAL NSL COVD-A: Performed by: ANESTHESIOLOGY

## 2020-05-21 PROCEDURE — 83036 HEMOGLOBIN GLYCOSYLATED A1C: CPT

## 2020-05-21 PROCEDURE — C1776 JOINT DEVICE (IMPLANTABLE): HCPCS | Performed by: ORTHOPAEDIC SURGERY

## 2020-05-21 PROCEDURE — 77030035643 HC BLD SAW OSC PRECIS STRY -C: Performed by: ORTHOPAEDIC SURGERY

## 2020-05-21 PROCEDURE — 65270000029 HC RM PRIVATE

## 2020-05-21 PROCEDURE — 77030002986 HC SUT PROL J&J -A: Performed by: ORTHOPAEDIC SURGERY

## 2020-05-21 PROCEDURE — 76010010054 HC POST OP PAIN BLOCK: Performed by: ORTHOPAEDIC SURGERY

## 2020-05-21 PROCEDURE — 0LS30ZZ REPOSITION RIGHT UPPER ARM TENDON, OPEN APPROACH: ICD-10-PCS | Performed by: ORTHOPAEDIC SURGERY

## 2020-05-21 PROCEDURE — 74011000250 HC RX REV CODE- 250: Performed by: NURSE PRACTITIONER

## 2020-05-21 PROCEDURE — 77030002937 HC SUT MERS J&J -B: Performed by: ORTHOPAEDIC SURGERY

## 2020-05-21 PROCEDURE — 82962 GLUCOSE BLOOD TEST: CPT

## 2020-05-21 PROCEDURE — 77030035257 HC SUT FORC FBR STRND STRY -B: Performed by: ORTHOPAEDIC SURGERY

## 2020-05-21 PROCEDURE — 77030020269 HC MISC IMPL: Performed by: ORTHOPAEDIC SURGERY

## 2020-05-21 PROCEDURE — 77030003602 HC NDL NRV BLK BBMI -B: Performed by: ANESTHESIOLOGY

## 2020-05-21 PROCEDURE — 0RRJ00Z REPLACEMENT OF RIGHT SHOULDER JOINT WITH REVERSE BALL AND SOCKET SYNTHETIC SUBSTITUTE, OPEN APPROACH: ICD-10-PCS | Performed by: ORTHOPAEDIC SURGERY

## 2020-05-21 PROCEDURE — 74011000272 HC RX REV CODE- 272: Performed by: ORTHOPAEDIC SURGERY

## 2020-05-21 PROCEDURE — 73030 X-RAY EXAM OF SHOULDER: CPT

## 2020-05-21 PROCEDURE — 77030002913 HC SUT ETHBND J&J -B: Performed by: ORTHOPAEDIC SURGERY

## 2020-05-21 PROCEDURE — 77030012547: Performed by: ORTHOPAEDIC SURGERY

## 2020-05-21 PROCEDURE — 74011000250 HC RX REV CODE- 250: Performed by: ANESTHESIOLOGY

## 2020-05-21 PROCEDURE — 74011000250 HC RX REV CODE- 250: Performed by: NURSE ANESTHETIST, CERTIFIED REGISTERED

## 2020-05-21 PROCEDURE — 77030011283 HC ELECTRD NDL COVD -A: Performed by: ORTHOPAEDIC SURGERY

## 2020-05-21 PROCEDURE — 77030039425 HC BLD LARYNG TRULITE DISP TELE -A: Performed by: ANESTHESIOLOGY

## 2020-05-21 PROCEDURE — 77030040922 HC BLNKT HYPOTHRM STRY -A: Performed by: ANESTHESIOLOGY

## 2020-05-21 PROCEDURE — 77030027138 HC INCENT SPIROMETER -A

## 2020-05-21 PROCEDURE — 94760 N-INVAS EAR/PLS OXIMETRY 1: CPT

## 2020-05-21 PROCEDURE — 77030012935 HC DRSG AQUACEL BMS -B: Performed by: ORTHOPAEDIC SURGERY

## 2020-05-21 PROCEDURE — 36415 COLL VENOUS BLD VENIPUNCTURE: CPT

## 2020-05-21 PROCEDURE — 77030018836 HC SOL IRR NACL ICUM -A: Performed by: ORTHOPAEDIC SURGERY

## 2020-05-21 PROCEDURE — 77030021678 HC GLIDESCP STAT DISP VERT -B: Performed by: ANESTHESIOLOGY

## 2020-05-21 PROCEDURE — 77030018846 HC SOL IRR STRL H20 ICUM -A: Performed by: ORTHOPAEDIC SURGERY

## 2020-05-21 PROCEDURE — 77030031139 HC SUT VCRL2 J&J -A: Performed by: ORTHOPAEDIC SURGERY

## 2020-05-21 DEVICE — SCREW BNE L50MM DIA4.5MM PROX CORT TIB S STL ST LOK FULL: Type: IMPLANTABLE DEVICE | Site: SHOULDER | Status: FUNCTIONAL

## 2020-05-21 DEVICE — COMPONENT GLEN FIX DIA10MM SHLDR METAGLENE LNG PEG GLOB: Type: IMPLANTABLE DEVICE | Site: SHOULDER | Status: FUNCTIONAL

## 2020-05-21 DEVICE — CUP HUM DIA42MM +9MM OFFSET STD SHLDR POLYETH DELT XTEND: Type: IMPLANTABLE DEVICE | Site: SHOULDER | Status: FUNCTIONAL

## 2020-05-21 DEVICE — SCREW BNE L40MM DIA4.5MM PROX CORT TIB S STL ST LOK FULL: Type: IMPLANTABLE DEVICE | Site: SHOULDER | Status: FUNCTIONAL

## 2020-05-21 DEVICE — COMPONENT GLENOSPHERE ECCENTRIC XTEND 42MM PLUS 4MM: Type: IMPLANTABLE DEVICE | Site: SHOULDER | Status: FUNCTIONAL

## 2020-05-21 DEVICE — SCREW BNE L24MM DIA4.5MM PROX CORT TIB S STL ST LOK FULL: Type: IMPLANTABLE DEVICE | Site: SHOULDER | Status: FUNCTIONAL

## 2020-05-21 RX ORDER — SODIUM CHLORIDE 0.9 % (FLUSH) 0.9 %
5-40 SYRINGE (ML) INJECTION AS NEEDED
Status: DISCONTINUED | OUTPATIENT
Start: 2020-05-21 | End: 2020-05-21 | Stop reason: HOSPADM

## 2020-05-21 RX ORDER — SODIUM CHLORIDE, SODIUM LACTATE, POTASSIUM CHLORIDE, CALCIUM CHLORIDE 600; 310; 30; 20 MG/100ML; MG/100ML; MG/100ML; MG/100ML
75 INJECTION, SOLUTION INTRAVENOUS CONTINUOUS
Status: DISCONTINUED | OUTPATIENT
Start: 2020-05-21 | End: 2020-05-21 | Stop reason: HOSPADM

## 2020-05-21 RX ORDER — HYDROCHLOROTHIAZIDE 12.5 MG/1
12.5 CAPSULE ORAL DAILY
Status: DISCONTINUED | OUTPATIENT
Start: 2020-05-22 | End: 2020-05-23 | Stop reason: HOSPADM

## 2020-05-21 RX ORDER — METFORMIN HYDROCHLORIDE 500 MG/1
500 TABLET ORAL 2 TIMES DAILY WITH MEALS
Status: DISCONTINUED | OUTPATIENT
Start: 2020-05-21 | End: 2020-05-23 | Stop reason: HOSPADM

## 2020-05-21 RX ORDER — LOSARTAN POTASSIUM 50 MG/1
100 TABLET ORAL DAILY
Status: DISCONTINUED | OUTPATIENT
Start: 2020-05-22 | End: 2020-05-23 | Stop reason: HOSPADM

## 2020-05-21 RX ORDER — LEVOTHYROXINE SODIUM 100 UG/1
100 TABLET ORAL
Status: DISCONTINUED | OUTPATIENT
Start: 2020-05-22 | End: 2020-05-23 | Stop reason: HOSPADM

## 2020-05-21 RX ORDER — FACIAL-BODY WIPES
10 EACH TOPICAL DAILY PRN
Status: DISCONTINUED | OUTPATIENT
Start: 2020-05-21 | End: 2020-05-23 | Stop reason: HOSPADM

## 2020-05-21 RX ORDER — SODIUM CHLORIDE 0.9 % (FLUSH) 0.9 %
5-40 SYRINGE (ML) INJECTION EVERY 8 HOURS
Status: DISCONTINUED | OUTPATIENT
Start: 2020-05-21 | End: 2020-05-21 | Stop reason: HOSPADM

## 2020-05-21 RX ORDER — HYDROMORPHONE HYDROCHLORIDE 2 MG/ML
0.5 INJECTION, SOLUTION INTRAMUSCULAR; INTRAVENOUS; SUBCUTANEOUS
Status: DISCONTINUED | OUTPATIENT
Start: 2020-05-21 | End: 2020-05-21 | Stop reason: HOSPADM

## 2020-05-21 RX ORDER — SODIUM CHLORIDE 9 MG/ML
75 INJECTION, SOLUTION INTRAVENOUS CONTINUOUS
Status: DISPENSED | OUTPATIENT
Start: 2020-05-21 | End: 2020-05-22

## 2020-05-21 RX ORDER — ATORVASTATIN CALCIUM 40 MG/1
80 TABLET, FILM COATED ORAL
Status: DISCONTINUED | OUTPATIENT
Start: 2020-05-21 | End: 2020-05-23 | Stop reason: HOSPADM

## 2020-05-21 RX ORDER — PIOGLITAZONEHYDROCHLORIDE 15 MG/1
45 TABLET ORAL
Status: DISCONTINUED | OUTPATIENT
Start: 2020-05-22 | End: 2020-05-23 | Stop reason: HOSPADM

## 2020-05-21 RX ORDER — HYDROMORPHONE HYDROCHLORIDE 1 MG/ML
1 INJECTION, SOLUTION INTRAMUSCULAR; INTRAVENOUS; SUBCUTANEOUS
Status: DISCONTINUED | OUTPATIENT
Start: 2020-05-21 | End: 2020-05-23 | Stop reason: HOSPADM

## 2020-05-21 RX ORDER — OXYCODONE AND ACETAMINOPHEN 5; 325 MG/1; MG/1
1 TABLET ORAL AS NEEDED
Status: DISCONTINUED | OUTPATIENT
Start: 2020-05-21 | End: 2020-05-21 | Stop reason: HOSPADM

## 2020-05-21 RX ORDER — ONDANSETRON 2 MG/ML
INJECTION INTRAMUSCULAR; INTRAVENOUS AS NEEDED
Status: DISCONTINUED | OUTPATIENT
Start: 2020-05-21 | End: 2020-05-21 | Stop reason: HOSPADM

## 2020-05-21 RX ORDER — SODIUM CHLORIDE 0.9 % (FLUSH) 0.9 %
5-40 SYRINGE (ML) INJECTION EVERY 8 HOURS
Status: DISCONTINUED | OUTPATIENT
Start: 2020-05-21 | End: 2020-05-23 | Stop reason: HOSPADM

## 2020-05-21 RX ORDER — SODIUM CHLORIDE 0.9 % (FLUSH) 0.9 %
5-40 SYRINGE (ML) INJECTION AS NEEDED
Status: DISCONTINUED | OUTPATIENT
Start: 2020-05-21 | End: 2020-05-23 | Stop reason: HOSPADM

## 2020-05-21 RX ORDER — DOCUSATE SODIUM 100 MG/1
100 CAPSULE, LIQUID FILLED ORAL DAILY
Status: DISCONTINUED | OUTPATIENT
Start: 2020-05-22 | End: 2020-05-23 | Stop reason: HOSPADM

## 2020-05-21 RX ORDER — ONDANSETRON 2 MG/ML
4 INJECTION INTRAMUSCULAR; INTRAVENOUS
Status: DISCONTINUED | OUTPATIENT
Start: 2020-05-21 | End: 2020-05-23 | Stop reason: HOSPADM

## 2020-05-21 RX ORDER — BUPIVACAINE HYDROCHLORIDE AND EPINEPHRINE 5; 5 MG/ML; UG/ML
INJECTION, SOLUTION EPIDURAL; INTRACAUDAL; PERINEURAL
Status: DISCONTINUED | OUTPATIENT
Start: 2020-05-21 | End: 2020-05-21 | Stop reason: HOSPADM

## 2020-05-21 RX ORDER — CEFAZOLIN SODIUM/WATER 2 G/20 ML
2 SYRINGE (ML) INTRAVENOUS EVERY 8 HOURS
Status: COMPLETED | OUTPATIENT
Start: 2020-05-21 | End: 2020-05-22

## 2020-05-21 RX ORDER — CEFAZOLIN SODIUM/WATER 2 G/20 ML
2 SYRINGE (ML) INTRAVENOUS ONCE
Status: COMPLETED | OUTPATIENT
Start: 2020-05-21 | End: 2020-05-21

## 2020-05-21 RX ORDER — ROCURONIUM BROMIDE 10 MG/ML
INJECTION, SOLUTION INTRAVENOUS AS NEEDED
Status: DISCONTINUED | OUTPATIENT
Start: 2020-05-21 | End: 2020-05-21 | Stop reason: HOSPADM

## 2020-05-21 RX ORDER — LIDOCAINE HYDROCHLORIDE 10 MG/ML
0.1 INJECTION INFILTRATION; PERINEURAL AS NEEDED
Status: DISCONTINUED | OUTPATIENT
Start: 2020-05-21 | End: 2020-05-21 | Stop reason: HOSPADM

## 2020-05-21 RX ORDER — GUAIFENESIN 100 MG/5ML
81 LIQUID (ML) ORAL
Status: DISCONTINUED | OUTPATIENT
Start: 2020-05-21 | End: 2020-05-23 | Stop reason: HOSPADM

## 2020-05-21 RX ORDER — FENTANYL CITRATE 50 UG/ML
INJECTION, SOLUTION INTRAMUSCULAR; INTRAVENOUS AS NEEDED
Status: DISCONTINUED | OUTPATIENT
Start: 2020-05-21 | End: 2020-05-21 | Stop reason: HOSPADM

## 2020-05-21 RX ORDER — GLYCOPYRROLATE 0.2 MG/ML
INJECTION INTRAMUSCULAR; INTRAVENOUS AS NEEDED
Status: DISCONTINUED | OUTPATIENT
Start: 2020-05-21 | End: 2020-05-21 | Stop reason: HOSPADM

## 2020-05-21 RX ORDER — PROPOFOL 10 MG/ML
INJECTION, EMULSION INTRAVENOUS AS NEEDED
Status: DISCONTINUED | OUTPATIENT
Start: 2020-05-21 | End: 2020-05-21 | Stop reason: HOSPADM

## 2020-05-21 RX ORDER — NALOXONE HYDROCHLORIDE 0.4 MG/ML
0.2 INJECTION, SOLUTION INTRAMUSCULAR; INTRAVENOUS; SUBCUTANEOUS AS NEEDED
Status: DISCONTINUED | OUTPATIENT
Start: 2020-05-21 | End: 2020-05-21 | Stop reason: HOSPADM

## 2020-05-21 RX ORDER — LIDOCAINE HYDROCHLORIDE 20 MG/ML
INJECTION, SOLUTION EPIDURAL; INFILTRATION; INTRACAUDAL; PERINEURAL AS NEEDED
Status: DISCONTINUED | OUTPATIENT
Start: 2020-05-21 | End: 2020-05-21 | Stop reason: HOSPADM

## 2020-05-21 RX ORDER — ACETAMINOPHEN 325 MG/1
650 TABLET ORAL
Status: DISCONTINUED | OUTPATIENT
Start: 2020-05-21 | End: 2020-05-23 | Stop reason: HOSPADM

## 2020-05-21 RX ORDER — MIDAZOLAM HYDROCHLORIDE 1 MG/ML
2 INJECTION, SOLUTION INTRAMUSCULAR; INTRAVENOUS
Status: COMPLETED | OUTPATIENT
Start: 2020-05-21 | End: 2020-05-21

## 2020-05-21 RX ORDER — HYDROGEN PEROXIDE 3 %
SOLUTION, NON-ORAL MISCELLANEOUS AS NEEDED
Status: DISCONTINUED | OUTPATIENT
Start: 2020-05-21 | End: 2020-05-21 | Stop reason: HOSPADM

## 2020-05-21 RX ORDER — MAGNESIUM CITRATE
296 SOLUTION, ORAL ORAL
Status: DISCONTINUED | OUTPATIENT
Start: 2020-05-21 | End: 2020-05-23 | Stop reason: HOSPADM

## 2020-05-21 RX ORDER — HYDROMORPHONE HYDROCHLORIDE 2 MG/1
2 TABLET ORAL
Status: DISCONTINUED | OUTPATIENT
Start: 2020-05-21 | End: 2020-05-23 | Stop reason: HOSPADM

## 2020-05-21 RX ORDER — NEOSTIGMINE METHYLSULFATE 1 MG/ML
INJECTION, SOLUTION INTRAVENOUS AS NEEDED
Status: DISCONTINUED | OUTPATIENT
Start: 2020-05-21 | End: 2020-05-21 | Stop reason: HOSPADM

## 2020-05-21 RX ORDER — BUPIVACAINE HYDROCHLORIDE 2.5 MG/ML
INJECTION, SOLUTION EPIDURAL; INFILTRATION; INTRACAUDAL
Status: DISCONTINUED | OUTPATIENT
Start: 2020-05-21 | End: 2020-05-21 | Stop reason: HOSPADM

## 2020-05-21 RX ORDER — DEXAMETHASONE SODIUM PHOSPHATE 4 MG/ML
INJECTION, SOLUTION INTRA-ARTICULAR; INTRALESIONAL; INTRAMUSCULAR; INTRAVENOUS; SOFT TISSUE AS NEEDED
Status: DISCONTINUED | OUTPATIENT
Start: 2020-05-21 | End: 2020-05-21 | Stop reason: HOSPADM

## 2020-05-21 RX ADMIN — ASPIRIN 81 MG 81 MG: 81 TABLET ORAL at 22:03

## 2020-05-21 RX ADMIN — PHENYLEPHRINE HYDROCHLORIDE 100 MCG: 10 INJECTION INTRAVENOUS at 11:21

## 2020-05-21 RX ADMIN — SODIUM CHLORIDE, SODIUM LACTATE, POTASSIUM CHLORIDE, AND CALCIUM CHLORIDE 75 ML/HR: 600; 310; 30; 20 INJECTION, SOLUTION INTRAVENOUS at 08:41

## 2020-05-21 RX ADMIN — Medication 2 G: at 10:59

## 2020-05-21 RX ADMIN — PHENYLEPHRINE HYDROCHLORIDE 100 MCG: 10 INJECTION INTRAVENOUS at 12:25

## 2020-05-21 RX ADMIN — SODIUM CHLORIDE, SODIUM LACTATE, POTASSIUM CHLORIDE, AND CALCIUM CHLORIDE: 600; 310; 30; 20 INJECTION, SOLUTION INTRAVENOUS at 12:26

## 2020-05-21 RX ADMIN — PHENYLEPHRINE HYDROCHLORIDE 100 MCG: 10 INJECTION INTRAVENOUS at 11:56

## 2020-05-21 RX ADMIN — PHENYLEPHRINE HYDROCHLORIDE 100 MCG: 10 INJECTION INTRAVENOUS at 12:08

## 2020-05-21 RX ADMIN — FENTANYL CITRATE 100 MCG: 50 INJECTION INTRAMUSCULAR; INTRAVENOUS at 10:58

## 2020-05-21 RX ADMIN — BUPIVACAINE HYDROCHLORIDE 10 ML: 2.5 INJECTION, SOLUTION EPIDURAL; INFILTRATION; INTRACAUDAL; PERINEURAL at 09:43

## 2020-05-21 RX ADMIN — PHENYLEPHRINE HYDROCHLORIDE 100 MCG: 10 INJECTION INTRAVENOUS at 12:13

## 2020-05-21 RX ADMIN — ATORVASTATIN CALCIUM 80 MG: 40 TABLET, FILM COATED ORAL at 22:03

## 2020-05-21 RX ADMIN — SODIUM CHLORIDE, SODIUM LACTATE, POTASSIUM CHLORIDE, AND CALCIUM CHLORIDE: 600; 310; 30; 20 INJECTION, SOLUTION INTRAVENOUS at 11:51

## 2020-05-21 RX ADMIN — HYDROMORPHONE HYDROCHLORIDE 0.5 MG: 2 INJECTION INTRAMUSCULAR; INTRAVENOUS; SUBCUTANEOUS at 13:10

## 2020-05-21 RX ADMIN — PROPOFOL 200 MG: 10 INJECTION, EMULSION INTRAVENOUS at 10:58

## 2020-05-21 RX ADMIN — PHENYLEPHRINE HYDROCHLORIDE 150 MCG: 10 INJECTION INTRAVENOUS at 12:00

## 2020-05-21 RX ADMIN — PHENYLEPHRINE HYDROCHLORIDE 100 MCG: 10 INJECTION INTRAVENOUS at 11:36

## 2020-05-21 RX ADMIN — MEPIVACAINE HYDROCHLORIDE 10 ML: 15 INJECTION, SOLUTION EPIDURAL; INFILTRATION at 09:43

## 2020-05-21 RX ADMIN — PHENYLEPHRINE HYDROCHLORIDE 100 MCG: 10 INJECTION INTRAVENOUS at 11:37

## 2020-05-21 RX ADMIN — ONDANSETRON 4 MG: 2 INJECTION INTRAMUSCULAR; INTRAVENOUS at 11:23

## 2020-05-21 RX ADMIN — Medication 2 G: at 18:37

## 2020-05-21 RX ADMIN — PHENYLEPHRINE HYDROCHLORIDE 100 MCG: 10 INJECTION INTRAVENOUS at 11:35

## 2020-05-21 RX ADMIN — DEXAMETHASONE SODIUM PHOSPHATE 4 MG: 4 INJECTION, SOLUTION INTRAMUSCULAR; INTRAVENOUS at 11:23

## 2020-05-21 RX ADMIN — HYDROMORPHONE HYDROCHLORIDE 2 MG: 2 TABLET ORAL at 18:37

## 2020-05-21 RX ADMIN — PHENYLEPHRINE HYDROCHLORIDE 150 MCG: 10 INJECTION INTRAVENOUS at 11:57

## 2020-05-21 RX ADMIN — METFORMIN HYDROCHLORIDE 500 MG: 500 TABLET ORAL at 16:08

## 2020-05-21 RX ADMIN — ROCURONIUM BROMIDE 10 MG: 10 INJECTION, SOLUTION INTRAVENOUS at 11:56

## 2020-05-21 RX ADMIN — HYDROMORPHONE HYDROCHLORIDE 1 MG: 1 INJECTION, SOLUTION INTRAMUSCULAR; INTRAVENOUS; SUBCUTANEOUS at 22:03

## 2020-05-21 RX ADMIN — GLYCOPYRROLATE 0.4 MG: 0.2 INJECTION, SOLUTION INTRAMUSCULAR; INTRAVENOUS at 12:26

## 2020-05-21 RX ADMIN — BUPIVACAINE HYDROCHLORIDE AND EPINEPHRINE BITARTRATE 10 ML: 5; .005 INJECTION, SOLUTION EPIDURAL; INTRACAUDAL; PERINEURAL at 09:43

## 2020-05-21 RX ADMIN — PHENYLEPHRINE HYDROCHLORIDE 100 MCG: 10 INJECTION INTRAVENOUS at 11:47

## 2020-05-21 RX ADMIN — MIDAZOLAM 1 MG: 1 INJECTION INTRAMUSCULAR; INTRAVENOUS at 09:39

## 2020-05-21 RX ADMIN — PHENYLEPHRINE HYDROCHLORIDE 100 MCG: 10 INJECTION INTRAVENOUS at 11:28

## 2020-05-21 RX ADMIN — PHENYLEPHRINE HYDROCHLORIDE 100 MCG: 10 INJECTION INTRAVENOUS at 12:04

## 2020-05-21 RX ADMIN — ROCURONIUM BROMIDE 50 MG: 10 INJECTION, SOLUTION INTRAVENOUS at 10:58

## 2020-05-21 RX ADMIN — PHENYLEPHRINE HYDROCHLORIDE 100 MCG: 10 INJECTION INTRAVENOUS at 12:20

## 2020-05-21 RX ADMIN — Medication 10 ML: at 22:06

## 2020-05-21 RX ADMIN — LIDOCAINE HYDROCHLORIDE 80 MG: 20 INJECTION, SOLUTION EPIDURAL; INFILTRATION; INTRACAUDAL; PERINEURAL at 10:58

## 2020-05-21 RX ADMIN — PHENYLEPHRINE HYDROCHLORIDE 50 MCG: 10 INJECTION INTRAVENOUS at 11:46

## 2020-05-21 RX ADMIN — PHENYLEPHRINE HYDROCHLORIDE 100 MCG: 10 INJECTION INTRAVENOUS at 11:41

## 2020-05-21 RX ADMIN — Medication 3 MG: at 12:26

## 2020-05-21 RX ADMIN — PHENYLEPHRINE HYDROCHLORIDE 100 MCG: 10 INJECTION INTRAVENOUS at 11:53

## 2020-05-21 RX ADMIN — PHENYLEPHRINE HYDROCHLORIDE 100 MCG: 10 INJECTION INTRAVENOUS at 11:44

## 2020-05-21 NOTE — ANESTHESIA PROCEDURE NOTES
Right Interscalene Block    Start time: 5/21/2020 9:40 AM  End time: 5/21/2020 9:43 AM  Performed by: Jaren Rivera MD  Authorized by: Jaren Rivera MD       Pre-procedure: Indications: at surgeon's request and post-op pain management    Preanesthetic Checklist: patient identified, risks and benefits discussed, site marked, timeout performed, anesthesia consent given and patient being monitored    Timeout Time: 09:40          Block Type:   Block Type: Interscalene  Laterality:  Right  Monitoring:  Standard ASA monitoring, responsive to questions, oxygen, continuous pulse ox, frequent vital sign checks and heart rate  Injection Technique:  Single shot  Procedures: ultrasound guided    Patient Position: seated  Prep: chlorhexidine    Location:  Interscalene  Needle Type:  Stimuplex  Needle Gauge:  22 G  Needle Localization:  Ultrasound guidance and nerve stimulator  Motor Response: minimal motor response >0.4 mA      Assessment:  Number of attempts:  1  Injection Assessment:  Incremental injection every 5 mL, negative aspiration for CSF, no paresthesia, ultrasound image on chart, no intravascular symptoms, negative aspiration for blood and local visualized surrounding nerve on ultrasound  Patient tolerance:  Patient tolerated the procedure well with no immediate complications  The relevant block area was scanned before, during, and after the local anesthetic injection and no gross abnormalities were observed.

## 2020-05-21 NOTE — DISCHARGE SUMMARY
[facility] Discharge Summary      Patient ID:  Alicja Medina  [id number]  44 y.o.  1945    Allergies: Patient has no known allergies. Admit date: 5/21/2020    Discharge date and time: 5/23/2020    Admitting Physician: Gene Alvarado MD     Discharge Physician: Gene Alvarado MD      * Admission Diagnoses: Complete tear of right rotator cuff, unspecified whether traumatic [M75.121]  Arthritis of shoulder region, right [M19.011]  Osteoarthritis of right glenohumeral joint [M19.011]  Complete tear of right rotator cuff [M75.121]    * Discharge Diagnoses:   Hospital Problems as of 5/23/2020 Date Reviewed: 5/6/2020          Codes Class Noted - Resolved POA    * (Principal) Osteoarthritis of right glenohumeral joint ICD-10-CM: M19.011  ICD-9-CM: 715.91  5/15/2020 - Present Yes        Complete tear of right rotator cuff ICD-10-CM: M75.121  ICD-9-CM: 727.61  5/15/2020 - Present Yes              Surgeon: Gene Alvarado MD          * Procedure: Procedure(s):  REVERSE RIGHT TOTAL SHOULDER ARTHROPLASTY WITH DELTA EXTEND PROSTHESIS, BICEPS TENODESIS           Perioperative Antibiotics: Ancef  _x__                                                Vancomycin  ___          Post Op complications: none      * Discharge Condition: good  Wound appears to be healing without any evidence of infection.          * Discharged to: Home    * Follow-up Care/Discharge instructions:  - Resume pre hospital diet            - Resume home medications per medical continuation form     CONTINUE PHYSICAL THERAPY  Sling right shoulder  - Follow up in office as scheduled       Signed:  Gene Alvarado MD  5/23/2020  7:53 AM

## 2020-05-21 NOTE — PROGRESS NOTES
TRANSFER - IN REPORT:    Verbal report received from Hari 153 (name) on [facility]  being received from [address] [place] [address] (unit) for ordered procedure      Report consisted of patients Situation, Background, Assessment and   Recommendations(SBAR). Information from the following report(s) SBAR was reviewed with the receiving nurse. Opportunity for questions and clarification was provided. Assessment completed upon patients arrival to unit and care assumed.

## 2020-05-21 NOTE — ADDENDUM NOTE
Addendum  created 05/21/20 1316 by Fanta Casarez MD    Allergies reviewed, Clinical Note Signed, Medication List reviewed

## 2020-05-21 NOTE — H&P
Date of Surgery Update:  Zee Vergara was seen and examined. History and physical has been reviewed. The patient has been examined.  There have been no significant clinical changes since the completion of the originally dated History and Physical.    Signed By: Gerald Doan MD     May 21, 2020 8:00 AM

## 2020-05-21 NOTE — PERIOP NOTES
TRANSFER - OUT REPORT:    Verbal report given to [id number] [facility] RN  on Tennille Sims  being transferred to  For routine post - op       Report consisted of patients Situation, Background, Assessment and   Recommendations(SBAR). Information from the following report(s) SBAR was reviewed with the receiving nurse. Opportunity for questions and clarification was provided.       Patient transported with:   O2 @ 2 liters  [employer]

## 2020-05-21 NOTE — PERIOP NOTES
Teach back method used in review of Hibiclens usage preop/postop, TB screening, pain management goals, falls precautions and use of Nozin for prevention of staph infections. Incentive spirometer reviewed and pt reached TOTAL TIDAL VOLUME 3250 ML OBSERVED   in preop holding.

## 2020-05-21 NOTE — PERIOP NOTES
TRANSFER - OUT REPORT:    Verbal report given to MIGDALIA ACKERMAN RN(name) on [facility]  being transferred to PRE-OP(unit) for routine progression of care       Report consisted of patients Situation, Background, Assessment and   Recommendations(SBAR). Information from the following report(s) SBAR, MAR and Recent Results was reviewed with the receiving nurse. Lines:   Peripheral IV 05/21/20 Left Hand (Active)   Site Assessment Clean, dry, & intact 5/21/2020  8:40 AM   Phlebitis Assessment 0 5/21/2020  8:40 AM   Infiltration Assessment 0 5/21/2020  8:40 AM   Dressing Status Clean, dry, & intact 5/21/2020  8:40 AM   Dressing Type Transparent;Tape 5/21/2020  8:40 AM   Hub Color/Line Status Pink; Infusing 5/21/2020  8:40 AM   Action Taken Blood drawn 5/21/2020  8:40 AM        Opportunity for questions and clarification was provided.       Patient transported with:   [employer]

## 2020-05-21 NOTE — PROGRESS NOTES
Care Management Interventions  PCP Verified by CM: Yes  Mode of Transport at Discharge: Self  Transition of Care Consult (CM Consult): [address]: Yes  Discharge Durable Medical Equipment: No  Physical Therapy Consult: Yes  Occupational Therapy Consult: Yes  Current Support Network: Lives with Spouse  Confirm Follow Up Transport: Family  The Plan for Transition of Care is Related to the Following Treatment Goals : return to independent function  The Patient and/or Patient Representative was Provided with a Choice of Provider and Agrees with the Discharge Plan?: Yes  Freedom of Choice List was Provided with Basic Dialogue that Supports the Patient's Individualized Plan of Care/Goals, Treatment Preferences and Shares the Quality Data Associated with the Providers?: Yes  Discharge Location  Discharge Placement: Home with home health    Patient is a 77 yr old male admitted for a right reverse TSA. He lives with spouse and plans to return home on d/c. Order rec'd to arrange Home health. Pt requested [facility]. Referral sent to office.  Will follow until d/c

## 2020-05-21 NOTE — ANESTHESIA POSTPROCEDURE EVALUATION
Procedure(s):  REVERSE RIGHT TOTAL SHOULDER ARTHROPLASTY WITH DELTA EXTEND PROSTHESIS, BICEPS TENODESIS.     general    Anesthesia Post Evaluation      Multimodal analgesia: multimodal analgesia used between 6 hours prior to anesthesia start to PACU discharge  Patient location during evaluation: PACU  Patient participation: complete - patient participated  Level of consciousness: awake and alert  Pain management: adequate  Airway patency: patent  Anesthetic complications: no  Cardiovascular status: acceptable  Respiratory status: acceptable  Hydration status: acceptable  Post anesthesia nausea and vomiting:  none      Vitals Value Taken Time   /74 5/21/2020 12:56 PM   Temp 36.4 °C (97.6 °F) 5/21/2020 12:41 PM   Pulse 101 5/21/2020 12:56 PM   Resp 16 5/21/2020 12:56 PM   SpO2 96 % 5/21/2020 12:56 PM

## 2020-05-21 NOTE — PROGRESS NOTES
05/21/20 1426   Oxygen Therapy   O2 Sat (%) 96 %   Pulse via Oximetry 90 beats per minute   O2 Device Room air   Patient eating and unable to do IS at this time. IS within reach. Patient encouraged to do every hour while awake-patient agreed. No shortness of breath or distress noted. BS are clear b/l. [facility] notes reviewed- continuous sat ordered HS.

## 2020-05-21 NOTE — OP NOTES
[address] [address]  OPERATIVE REPORT    Name:  Wanda Cooper  MR#:  [id number]  :  1945  ACCOUNT #:  [de-identified]  DATE OF SERVICE:  2020    PREOPERATIVE DIAGNOSES:  1. Rotator cuff tear, right shoulder. 2.  Biceps tendinitis, right shoulder. 3.  Glenohumeral osteoarthritis, right shoulder. POSTOPERATIVE DIAGNOSES:  1. Rotator cuff tear, right shoulder. 2.  Biceps tendinitis, right shoulder. 3.  Glenohumeral osteoarthritis, right shoulder. PROCEDURE PERFORMED:  Reverse right total shoulder arthroplasty with a Delta Xtend prosthesis, biceps tenodesis. SURGEON:  Serene Ojeda. Martha Cuevas MD    FIRST ASSISTANTJesus Lyman NP. Use of a first assistant was necessary to optimize the patient's safety and outcome. First assistant was present during the entire procedure, assisted in the dissection, the cuts, and placement of the prosthesis. ANESTHESIA:  General with an interscalene block. COMPLICATIONS:  None. IMPLANTS:  Hardware utilized is a +10 metaglene 42 eccentric +4 mm lateralized glenoid, 42 +9 cup, a 14 mm press-fit Porocoat prosthesis, a 50 mm superior, a 40 mm inferior, a 24 mm anterior nonlocking cortical screw. ESTIMATED BLOOD LOSS:  200 mL. PATHOLOGY:  1.  Rotator cuff tear involving supra and infraspinatus. 2.  Biceps tendinitis. 3.  Glenohumeral osteoarthritis. CPT CODE:  97872 with a modifier AS for assistant surgeon. ICD-10 CODES:  M19.011, M75.121, M75.21. INDICATIONS:  The patient is a 71-year-old gentleman with recalcitrant right shoulder pain. Preoperative physical exam, radiographs, and an MRI demonstrate end-stage glenohumeral osteoarthritis right shoulder with biceps tendinitis and a rotator cuff tear. The patient has exhausted nonoperative modalities and following preoperative medical clearance, he was brought to the operative suite for operative intervention.     PROCEDURE:  Following identification of the patient, the patient was taken to the operative suite. Following administration of general anesthesia, interscalene block for postop pain control, 2 g of IV Ancef, the patient was then very carefully positioned on the operative table in the beach-chair fashion. Right shoulder was then prepped and draped in the sterile fashion. A standard deltopectoral incision was identified and marked. InteguSeal was applied. Once InteguSeal was allowed to cure, the skin was incised. Subcutaneous tissue was then dissected down to the cephalic vein. This was dissected proximally and distally, retracted laterally with deltoid, pec major and strap muscles were retracted medially. Biceps tendon was then identified. It was dissected up through the rotator interval.  It was tagged, transected for later tenodesis. Subscapularis was elevated sharply off the lesser tuberosity in a subscapularis peel configuration and tagged for later reconstruction. The supraspinatus was noted to be torn into the infraspinatus. Posterior aspect of the cuff was intact. Humeral head was then very carefully dislocated. Care was taken to protect the axillary nerve. There were marked degenerative changes of the humeral head and glenoid. Proximal cutting guide was assembled. Proximal cut was then made. Circumferential osteophytes were then resected. Attention was then turned to the glenoid. Glenoid was then meticulously exposed. Once exposure was complete, starter wire was then drilled. Glenoid was then drilled and reamed to a depth of +10 metaglene. The +10 metaglene was inserted and secured with 50 mm superior, 40 mm inferior and 24 mm anterior nonlocking cortical screw. Metaglene was stable. A 42 eccentric +4 mm lateralized glenoid was then inserted and secured in the standard fashion. Metaglene and glenosphere were stable. Humerus was dislocated back from the wound and reamed up to a 14 mm reamer. It was then broached with a 14 mm broach.   Proximal reaming was performed as well. At this point, it was noted that a 14 stem with a 42 +9 cup gave excellent stability and mobility. At this point, all trial components were then removed. The humeral canal was irrigated and dried. Bone graft was then harvested from the humeral head for later use during press-fit fixation. Humeral canal was irrigated and dried. The 14 mm Porocoat prosthesis was then assembled on the back table in neural and secured. The fins of the prosthesis were loaded with #5 sutures. Once the humeral canal was irrigated and dried, the 14 mm Porocoat prosthesis was then press-fit into position. The metaphysis was bone grafted with bone from the humeral head. This yielded an excellent fixation of our component, which was stable. The true 42 +9 cup was inserted. Shoulder was reduced with excellent stability and mobility. The subscapularis was then repaired back to the fins of the prosthesis using #5 Mersilene sutures. Rotator interval was approximated with #5 Mersilene sutures. Biceps was tenodesed using #5 Mersilene sutures. Arm was put through range of motion and stable. Axillary nerve was intact. The wound was then irrigated. Deltopectoral interval was approximated with #2 Mersilene sutures. Skin was closed with 0 Vicryl figure-of-eight sutures and a 2-0 Prolene subcuticular stitch. A sterile dressing was applied. A sling and swathe was applied. The patient was then transferred to the recovery room in stable condition.       MD JACKIE Hahn/[id number]  D:  05/21/2020 12:42  T:  05/21/2020 15:24  JOB #:  [id number]  CC:  MD Prerna Saul NP

## 2020-05-21 NOTE — ANESTHESIA PREPROCEDURE EVALUATION
Relevant Problems   No relevant active problems       Anesthetic History   No history of anesthetic complications            Review of Systems / Medical History  Patient summary reviewed, nursing notes reviewed and pertinent labs reviewed    Pulmonary  Within defined limits                 Neuro/Psych   Within defined limits           Cardiovascular    Hypertension                Comments: H/O left endarterectomy 2019   GI/Hepatic/Renal  Within defined limits              Endo/Other    Diabetes: well controlled, type 2  Hypothyroidism: well controlled       Other Findings              Physical Exam    Airway  Mallampati: II  TM Distance: 4 - 6 cm  Neck ROM: normal range of motion   Mouth opening: Normal     Cardiovascular  Regular rate and rhythm,  S1 and S2 normal,  no murmur, click, rub, or gallop  Rhythm: regular           Dental  No notable dental hx       Pulmonary  Breath sounds clear to auscultation               Abdominal  Abdominal exam normal       Other Findings            Anesthetic Plan    ASA: 2  Anesthesia type: general - interscalene block      Post-op pain plan if not by surgeon: peripheral nerve block single    Induction: Intravenous  Anesthetic plan and risks discussed with: Patient

## 2020-05-22 LAB
ANION GAP SERPL CALC-SCNC: 8 MMOL/L (ref 7–16)
BUN SERPL-MCNC: 54 MG/DL (ref 8–23)
CALCIUM SERPL-MCNC: 8.7 MG/DL (ref 8.3–10.4)
CHLORIDE SERPL-SCNC: 108 MMOL/L (ref 98–107)
CO2 SERPL-SCNC: 21 MMOL/L (ref 21–32)
CREAT SERPL-MCNC: 2.84 MG/DL (ref 0.8–1.5)
ERYTHROCYTE [DISTWIDTH] IN BLOOD BY AUTOMATED COUNT: 13.5 % (ref 11.9–14.6)
GLUCOSE SERPL-MCNC: 144 MG/DL (ref 65–100)
HCT VFR BLD AUTO: 34.7 % (ref 41.1–50.3)
HGB BLD-MCNC: 11.4 G/DL (ref 13.6–17.2)
MAGNESIUM SERPL-MCNC: 1.8 MG/DL (ref 1.8–2.4)
MCH RBC QN AUTO: 32.2 PG (ref 26.1–32.9)
MCHC RBC AUTO-ENTMCNC: 32.9 G/DL (ref 31.4–35)
MCV RBC AUTO: 98 FL (ref 79.6–97.8)
NRBC # BLD: 0 K/UL (ref 0–0.2)
PLATELET # BLD AUTO: 212 K/UL (ref 150–450)
PMV BLD AUTO: 10.7 FL (ref 9.4–12.3)
POTASSIUM SERPL-SCNC: 4.7 MMOL/L (ref 3.5–5.1)
RBC # BLD AUTO: 3.54 M/UL (ref 4.23–5.6)
SODIUM SERPL-SCNC: 137 MMOL/L (ref 136–145)
WBC # BLD AUTO: 8.4 K/UL (ref 4.3–11.1)

## 2020-05-22 PROCEDURE — 74011250637 HC RX REV CODE- 250/637: Performed by: ORTHOPAEDIC SURGERY

## 2020-05-22 PROCEDURE — 83735 ASSAY OF MAGNESIUM: CPT

## 2020-05-22 PROCEDURE — 97161 PT EVAL LOW COMPLEX 20 MIN: CPT

## 2020-05-22 PROCEDURE — 65270000029 HC RM PRIVATE

## 2020-05-22 PROCEDURE — 97116 GAIT TRAINING THERAPY: CPT

## 2020-05-22 PROCEDURE — 74011250637 HC RX REV CODE- 250/637: Performed by: NURSE PRACTITIONER

## 2020-05-22 PROCEDURE — 74011250636 HC RX REV CODE- 250/636: Performed by: NURSE PRACTITIONER

## 2020-05-22 PROCEDURE — 85027 COMPLETE CBC AUTOMATED: CPT

## 2020-05-22 PROCEDURE — 80048 BASIC METABOLIC PNL TOTAL CA: CPT

## 2020-05-22 PROCEDURE — 36415 COLL VENOUS BLD VENIPUNCTURE: CPT

## 2020-05-22 PROCEDURE — 97110 THERAPEUTIC EXERCISES: CPT

## 2020-05-22 PROCEDURE — 74011000250 HC RX REV CODE- 250: Performed by: NURSE PRACTITIONER

## 2020-05-22 RX ADMIN — HYDROMORPHONE HYDROCHLORIDE 1 MG: 1 INJECTION, SOLUTION INTRAMUSCULAR; INTRAVENOUS; SUBCUTANEOUS at 13:13

## 2020-05-22 RX ADMIN — ATORVASTATIN CALCIUM 80 MG: 40 TABLET, FILM COATED ORAL at 21:04

## 2020-05-22 RX ADMIN — LEVOTHYROXINE SODIUM 100 MCG: 0.1 TABLET ORAL at 05:43

## 2020-05-22 RX ADMIN — HYDROMORPHONE HYDROCHLORIDE 1 MG: 1 INJECTION, SOLUTION INTRAMUSCULAR; INTRAVENOUS; SUBCUTANEOUS at 09:41

## 2020-05-22 RX ADMIN — MULTIPLE VITAMINS W/ MINERALS TAB 1 TABLET: TAB at 08:12

## 2020-05-22 RX ADMIN — HYDROMORPHONE HYDROCHLORIDE 1 MG: 1 INJECTION, SOLUTION INTRAMUSCULAR; INTRAVENOUS; SUBCUTANEOUS at 05:43

## 2020-05-22 RX ADMIN — HYDROMORPHONE HYDROCHLORIDE 2 MG: 2 TABLET ORAL at 21:04

## 2020-05-22 RX ADMIN — HYDROMORPHONE HYDROCHLORIDE 2 MG: 2 TABLET ORAL at 08:13

## 2020-05-22 RX ADMIN — METFORMIN HYDROCHLORIDE 500 MG: 500 TABLET ORAL at 17:26

## 2020-05-22 RX ADMIN — Medication 10 ML: at 21:29

## 2020-05-22 RX ADMIN — Medication 2 G: at 11:00

## 2020-05-22 RX ADMIN — HYDROMORPHONE HYDROCHLORIDE 1 MG: 1 INJECTION, SOLUTION INTRAMUSCULAR; INTRAVENOUS; SUBCUTANEOUS at 03:24

## 2020-05-22 RX ADMIN — Medication 10 ML: at 05:51

## 2020-05-22 RX ADMIN — HYDROMORPHONE HYDROCHLORIDE 1 MG: 1 INJECTION, SOLUTION INTRAMUSCULAR; INTRAVENOUS; SUBCUTANEOUS at 01:34

## 2020-05-22 RX ADMIN — METFORMIN HYDROCHLORIDE 500 MG: 500 TABLET ORAL at 08:12

## 2020-05-22 RX ADMIN — HYDROMORPHONE HYDROCHLORIDE 2 MG: 2 TABLET ORAL at 15:38

## 2020-05-22 RX ADMIN — Medication 2 G: at 03:24

## 2020-05-22 RX ADMIN — ASPIRIN 81 MG 81 MG: 81 TABLET ORAL at 21:04

## 2020-05-22 RX ADMIN — DOCUSATE SODIUM 100 MG: 100 CAPSULE, LIQUID FILLED ORAL at 08:12

## 2020-05-22 RX ADMIN — LOSARTAN POTASSIUM 100 MG: 50 TABLET ORAL at 08:12

## 2020-05-22 RX ADMIN — PIOGLITAZONE 45 MG: 15 TABLET ORAL at 05:43

## 2020-05-22 RX ADMIN — HYDROMORPHONE HYDROCHLORIDE 1 MG: 1 INJECTION, SOLUTION INTRAMUSCULAR; INTRAVENOUS; SUBCUTANEOUS at 18:39

## 2020-05-22 RX ADMIN — HYDROCHLOROTHIAZIDE 12.5 MG: 12.5 CAPSULE ORAL at 08:12

## 2020-05-22 NOTE — PROGRESS NOTES
Patient MEWS score currently a 3. Oxygen saturation 89%, placed patient on 2 Liters nasal cannula. O2 vi to 95%. Patient's heart rate currently elevated but denies any pain at this time. No other signs of distress present. Will continue to monitor.

## 2020-05-22 NOTE — PROGRESS NOTES
Problem: Mobility Impaired (Adult and Pediatric)  Goal: *Acute Goals and Plan of Care (Insert Text)  Description: GOALS (1-4 days):  (1.)  Patient will move from supine to sit and sit to supine  in bed with MODIFIED INDEPENDENCE. (2.)  Patient will transfer from bed to chair and chair to bed with SUPERVISION using the least restrictive device. (3.)  Patient will ambulate with SUPERVISION for 300 feet with the least restrictive device. (4.)  Patient will be safe with shoulder HEP, with caregiver's help, to increase range of motion per MD orders. 5) pt able to go up & down 2 steps with rail & SBA.  ________________________________________________________________________________________________   Outcome: Progressing Towards Goal     PHYSICAL THERAPY: Initial Assessment, Treatment Day: Day of Assessment, and AM 5/22/2020  INPATIENT: Hospital Day: 2  [place] / [place] / Product Type: Medicare /      NAME/AGE/GENDER: Karen Maciel is a 77 y.o. male   PRIMARY DIAGNOSIS: Complete tear of right rotator cuff, unspecified whether traumatic [M75.121]  Arthritis of shoulder region, right [M19.011]  Osteoarthritis of right glenohumeral joint [M19.011]  Complete tear of right rotator cuff [M75.121] Osteoarthritis of right glenohumeral joint Osteoarthritis of right glenohumeral joint  Procedure(s) (LRB):  REVERSE RIGHT TOTAL SHOULDER ARTHROPLASTY WITH DELTA EXTEND PROSTHESIS, BICEPS TENODESIS (Right)  1 Day Post-Op  ICD-10: Treatment Diagnosis:   Pain in Right Shoulder (M25.511)  Stiffness of Right Shoulder, Not elsewhere classified (M25.611)  Generalized Muscle Weakness (M62.81)  Difficulty in walking, Not elsewhere classified (R26.2)   Precaution/Allergies:  Patient has no known allergies. ASSESSMENT:     Mr. Noe Merritt presents with stiff & painful right shoulder along with mildly unsteady gait & transfers.  This pt will benefit from follow up therapy to help restore safe function & to establish a HEP prior to returning home with caregiver. This section established at most recent assessment   PROBLEM LIST (Impairments causing functional limitations):  Decreased [place] with Bed Mobility  Decreased [place] with Transfers  Decreased [place] with Ambulation   Decreased [place] with shoulder HEP   INTERVENTIONS PLANNED: (Benefits and precautions of physical therapy have been discussed with the patient.)  Bed Mobility Training  Transfer Training  Gait Training  Therapeutic Exercises per MD orders  Modalities for Pain     TREATMENT PLAN: Frequency/Duration: twice daily for duration of hospital stay  Rehabilitation Potential For Stated Goals: Good     RECOMMENDED REHABILITATION/EQUIPMENT: (at time of discharge pending progress): Continue Skilled Therapy and Home Health: Physical Therapy. HISTORY:   History of Present Injury/Illness (Reason for Referral):  Right TSA Reverse  Past Medical History/Comorbidities:   Mr. Stacy Hoffman  has a past medical history of Arthritis, Carotid artery disease ([facility] [address] Congenital absence of one kidney, Diabetes ([facility] [address] Essential hypertension, Hyperlipidemia, and Thyroid disease. Mr. Stacy Hoffman  has a past surgical history that includes hx carotid endarterectomy (01/28/2019); hx cataract removal (Bilateral); hx hernia repair; hx orthopaedic (2009); and hx colonoscopy.   Social History/Living Environment:   Home Environment: Private residence  # Steps to Enter: 2  Rails to Enter: Yes  Hand Rails : Left  One/Two Story Residence: Two story, live on 1st floor  Living Alone: No  Support Systems: Spouse/Significant Other/Partner  Patient Expects to be Discharged to[de-identified] Private residence  Prior Level of Function/Work/Activity:  Pt was independent without an assistive device prior to this admission   Number of Personal Factors/Comorbidities that affect the Plan of Care: 3+: HIGH COMPLEXITY   EXAMINATION:   Most Recent Physical Functioning: Gross Assessment:  AROM: Within functional limits(left UE & both LE's)  Strength: Within functional limits(left UE & both LE's)  Coordination: Within functional limits(left UE & both LE's)                    Balance:  Sitting: Intact; Without support  Standing: Impaired; Without support Bed Mobility:  Supine to Sit: Stand-by assistance  Sit to Supine: Stand-by assistance  Scooting: Stand-by assistance       Transfers:  Sit to Stand: Stand-by assistance  Stand to Sit: Stand-by assistance  Bed to Chair: Stand-by assistance  Gait:     Speed/Kenia: Delayed  Step Length: Left shortened;Right shortened  Gait Abnormalities: Decreased step clearance(mild sway)  Distance (ft): 200 Feet (ft)  Assistive Device: (none)  Ambulation - Level of Assistance: Stand-by assistance   Functional Mobility:         Gait/Ambulation:  sba        Transfers:  sba        Bed Mobility:  sba   Body Structures Involved:  Joints  Muscles Body Functions Affected:  Sensory/Pain  Movement Related Activities and Participation Affected:  General Tasks and Demands  Mobility   Number of elements that affect the Plan of Care: 4+: HIGH COMPLEXITY   CLINICAL PRESENTATION:   Presentation: Stable and uncomplicated: LOW COMPLEXITY   CLINICAL DECISION MAKING:   [facility] 6 Clicks   Basic Mobility Inpatient Short Form  How much difficulty does the patient currently have. .. Unable A Lot A Little None   1. Turning over in bed (including adjusting bedclothes, sheets and blankets)? [] 1   [] 2   [x] 3   [] 4   2. Sitting down on and standing up from a chair with arms ( e.g., wheelchair, bedside commode, etc.)   [] 1   [] 2   [x] 3   [] 4   3. Moving from lying on back to sitting on the side of the bed? [] 1   [] 2   [x] 3   [] 4   How much help from another person does the patient currently need. .. Total A Lot A Little None   4. Moving to and from a bed to a chair (including a wheelchair)? [] 1   [] 2   [x] 3   [] 4   5.   Need to walk in hospital room? [] 1   [] 2   [x] 3   [] 4   6. Climbing 3-5 steps with a railing? [x] 1   [] 2   [] 3   [] 4   © 2007, Trustees of [facility] [employer], under license to [employer]. All rights reserved      Score:  Initial: 16 Most Recent: X (Date: -- )    Interpretation of Tool:  Represents activities that are increasingly more difficult (i.e. Bed mobility, Transfers, Gait). Medical Necessity:     Patient is expected to demonstrate progress in   strength, balance, coordination, and functional technique   to   decrease assistance required with bed mobility, transfers & gait   . Reason for Services/Other Comments:  Patient continues to require skilled intervention due to   Pt not independent with functional mobility   . Use of outcome tool(s) and clinical judgement create a POC that gives a: Clear prediction of patient's progress: LOW COMPLEXITY            TREATMENT:   (In addition to Assessment/Re-Assessment sessions the following treatments were rendered)   Pre-treatment Symptoms/Complaints:  pt agreeable  Pain: Initial: numeric sale  Pain Intensity 1: 3  Pain Location 1: Shoulder  Pain Intervention(s) 1: Repositioned  Post Session:  3/10     Therapeutic Exercise: (12 Minutes):  Exercises per grid below to improve mobility and dynamic movement of arm - right to improve functional endurance . Required minimal visual and verbal cues to promote proper body alignment and promote proper body mechanics.     Assessment/ 11 min     Date:  5/22 Date:   Date:     ACTIVITY/EXERCISE AM PM AM PM AM PM   Gripping 15        Wrist Flexion/Extension 15        Wrist Ulnar/Radial Deviation         Pronation/Supination 15        Elbow Flexion/Extension 15aa        Shoulder Flexion/Extension 15aa flex to tolerance        Shoulder AB/ADduction         Shoulder IR/ER         Pulleys         Pendulums 15aa CW & CCW        Shrugs         Isometric:                 Flexion         Extension         ABduction         ADduction Biceps/Triceps                  B = bilateral; AA = active assistive; A = active; P = passive  Education:  [x]  Home Exercises  [x]  Sling Application   [x]  Movement Precautions   []  Pulleys   []  Use of Ice   []  Other:   Treatment/Session Assessment:    Response to Treatment:  tolerated well  Interdisciplinary Collaboration:   Registered Nurse  After treatment position/precautions:   Up in chair  Bed/Chair-wheels locked  Call light within reach  RN notified   Compliance with Program/Exercises: Will assess as treatment progresses. Recommendations/Intent for next treatment session:  Treatment next visit will focus on increasing Mr. Santana's independence with bed mobility, transfers, gait training, strength/ROM exercises, modalities for pain, and patient education.    Total Treatment Duration:  PT Patient Time In/Time Out  Time In: 0906  Time Out: Eliceo 649, PT

## 2020-05-22 NOTE — PROGRESS NOTES
Problem: Mobility Impaired (Adult and Pediatric)  Goal: *Acute Goals and Plan of Care (Insert Text)  Description: GOALS (1-4 days):  (1.)  Patient will move from supine to sit and sit to supine  in bed with MODIFIED INDEPENDENCE. (2.)  Patient will transfer from bed to chair and chair to bed with SUPERVISION using the least restrictive device. (3.)  Patient will ambulate with SUPERVISION for 300 feet with the least restrictive device. (4.)  Patient will be safe with shoulder HEP, with caregiver's help, to increase range of motion per MD orders. 5) pt able to go up & down 2 steps with rail & SBA.  ________________________________________________________________________________________________   Outcome: Progressing Towards Goal     PHYSICAL THERAPY: Daily Note, Treatment Day: Day of Assessment and PM 5/22/2020  INPATIENT: Hospital Day: 2  [place] / [place] / Product Type: Medicare /      NAME/AGE/GENDER: Karen Maciel is a 77 y.o. male   PRIMARY DIAGNOSIS: Complete tear of right rotator cuff, unspecified whether traumatic [M75.121]  Arthritis of shoulder region, right [M19.011]  Osteoarthritis of right glenohumeral joint [M19.011]  Complete tear of right rotator cuff [M75.121] Osteoarthritis of right glenohumeral joint Osteoarthritis of right glenohumeral joint  Procedure(s) (LRB):  REVERSE RIGHT TOTAL SHOULDER ARTHROPLASTY WITH DELTA EXTEND PROSTHESIS, BICEPS TENODESIS (Right)  1 Day Post-Op  ICD-10: Treatment Diagnosis:   · Pain in Right Shoulder (M25.511)  · Stiffness of Right Shoulder, Not elsewhere classified (M25.611)  · Generalized Muscle Weakness (M62.81)  · Difficulty in walking, Not elsewhere classified (R26.2)   Precaution/Allergies:  Patient has no known allergies. ASSESSMENT:     Mr. Noe Merritt presents with stiff & painful right shoulder along with mildly unsteady gait & transfers.  This pt will benefit from follow up therapy to help restore safe function & to establish a HEP prior to returning home with caregiver. In pm session pt showed increased gait distance & improved tolerance to exercises. This section established at most recent assessment   PROBLEM LIST (Impairments causing functional limitations):  1. Decreased Isabela with Bed Mobility  2. Decreased Isabela with Transfers  3. Decreased Isabela with Ambulation   4. Decreased Isabela with shoulder HEP   INTERVENTIONS PLANNED: (Benefits and precautions of physical therapy have been discussed with the patient.)  1. Bed Mobility Training  2. Transfer Training  3. Gait Training  4. Therapeutic Exercises per MD orders  5. Modalities for Pain     TREATMENT PLAN: Frequency/Duration: twice daily for duration of hospital stay  Rehabilitation Potential For Stated Goals: Good     RECOMMENDED REHABILITATION/EQUIPMENT: (at time of discharge pending progress): Continue Skilled Therapy and Home Health: Physical Therapy. HISTORY:   History of Present Injury/Illness (Reason for Referral):  Right TSA Reverse  Past Medical History/Comorbidities:   Mr. Dayana Perez  has a past medical history of Arthritis, Carotid artery disease ([facility] [address] Congenital absence of one kidney, Diabetes ([facility] [address] Essential hypertension, Hyperlipidemia, and Thyroid disease. Mr. Dayana Perez  has a past surgical history that includes hx carotid endarterectomy (01/28/2019); hx cataract removal (Bilateral); hx hernia repair; hx orthopaedic (2009); and hx colonoscopy.   Social History/Living Environment:   Home Environment: Private residence  # Steps to Enter: 2  Rails to Enter: Yes  Hand Rails : Left  One/Two Story Residence: Two story, live on 1st floor  Living Alone: No  Support Systems: Spouse/Significant Other/Partner  Patient Expects to be Discharged to[de-identified] Private residence  Current DME Used/Available at Home: None  Prior Level of Function/Work/Activity:  Pt was independent without an assistive device prior to this admission   Number of Personal Factors/Comorbidities that affect the Plan of Care: 3+: HIGH COMPLEXITY   EXAMINATION:   Most Recent Physical Functioning:   Gross Assessment:  AROM: Within functional limits(left UE & both LE's)  Strength: Within functional limits(left UE & both LE's)  Coordination: Within functional limits(left UE & both LE's)                    Balance:  Sitting: Intact; Without support  Standing: Impaired; Without support Bed Mobility:  Supine to Sit: Stand-by assistance  Sit to Supine: Stand-by assistance  Scooting: Stand-by assistance       Transfers:  Sit to Stand: Stand-by assistance  Stand to Sit: Stand-by assistance  Bed to Chair: Stand-by assistance  Gait:     Speed/Kenia: Delayed  Step Length: Left shortened;Right shortened  Gait Abnormalities: Decreased step clearance(mild sway)  Distance (ft): 300 Feet (ft)  Assistive Device: (none)  Ambulation - Level of Assistance: Stand-by assistance  Number of Stairs Trained: 2  Stairs - Level of Assistance: Contact guard assistance  Rail Use: Left   Duration: 11 Minutes   Functional Mobility:         Gait/Ambulation:  sba        Transfers:  sba        Bed Mobility:  sba   Body Structures Involved:  1. Joints  2. Muscles Body Functions Affected:  1. Sensory/Pain  2. Movement Related Activities and Participation Affected:  1. General Tasks and Demands  2. Mobility   Number of elements that affect the Plan of Care: 4+: HIGH COMPLEXITY   CLINICAL PRESENTATION:   Presentation: Stable and uncomplicated: LOW COMPLEXITY   CLINICAL DECISION MAKIN[address] [place] [address] AM-PAC 6 Clicks   Basic Mobility Inpatient Short Form  How much difficulty does the patient currently have. .. Unable A Lot A Little None   1. Turning over in bed (including adjusting bedclothes, sheets and blankets)? [] 1   [] 2   [x] 3   [] 4   2. Sitting down on and standing up from a chair with arms ( e.g., wheelchair, bedside commode, etc.)   [] 1   [] 2   [x] 3   [] 4   3.   Moving from lying on back to sitting on the side of the bed? [] 1   [] 2   [x] 3   [] 4   How much help from another person does the patient currently need. .. Total A Lot A Little None   4. Moving to and from a bed to a chair (including a wheelchair)? [] 1   [] 2   [x] 3   [] 4   5. Need to walk in hospital room? [] 1   [] 2   [x] 3   [] 4   6. Climbing 3-5 steps with a railing? [x] 1   [] 2   [] 3   [] 4   © 2007, Trustees of [address] [place] [address] [address] [address], under license to [email or web address]. All rights reserved      Score:  Initial: 16 Most Recent: X (Date: -- )    Interpretation of Tool:  Represents activities that are increasingly more difficult (i.e. Bed mobility, Transfers, Gait). Medical Necessity:     · Patient is expected to demonstrate progress in   · strength, balance, coordination, and functional technique  ·  to   · decrease assistance required with bed mobility, transfers & gait   · .  Reason for Services/Other Comments:  · Patient continues to require skilled intervention due to   · Pt not independent with functional mobility   · . Use of outcome tool(s) and clinical judgement create a POC that gives a: Clear prediction of patient's progress: LOW COMPLEXITY            TREATMENT:   (In addition to Assessment/Re-Assessment sessions the following treatments were rendered)   Pre-treatment Symptoms/Complaints:  none  Pain: Initial: numeric sale  Pain Intensity 1: 3  Pain Location 1: Shoulder  Pain Intervention(s) 1: Cold pack, Exercise  Post Session:  3/10     Therapeutic Exercise: (12 Minutes):  Exercises per grid below to improve mobility and dynamic movement of arm - right to improve functional endurance . Required minimal visual and verbal cues to promote proper body alignment and promote proper body mechanics.     Gait Training (11 Minutes):  Gait training to improve and/or restore physical functioning as related to mobility, strength, balance, coordination and dynamic movement of leg - bilateral to improve functional gait. Ambulated 300 Feet (ft) with Stand-by assistance using a (none) and minimal cues related to their stride length and heel strike to promote proper body alignment, promote proper body posture and promote proper body mechanics. Date:  5/22 Date:   Date:     ACTIVITY/EXERCISE AM PM AM PM AM PM   Gripping 15 15       Wrist Flexion/Extension 15 15       Wrist Ulnar/Radial Deviation         Pronation/Supination 15 15       Elbow Flexion/Extension 15aa 15aa       Shoulder Flexion/Extension 15aa flex to tolerance 15aa flex to tolerance       Shoulder AB/ADduction         Shoulder IR/ER         Pulleys         Pendulums 15aa CW & CCW 15aa CW & CCW       Shrugs         Isometric:                 Flexion         Extension         ABduction         ADduction         Biceps/Triceps                  B = bilateral; AA = active assistive; A = active; P = passive  Education:  [x]  Home Exercises  [x]  Sling Application   [x]  Movement Precautions   []  Pulleys   []  Use of Ice   []  Other:   Treatment/Session Assessment:    · Response to Treatment: pt pleased with progress  · Interdisciplinary Collaboration:   o Registered Nurse  · After treatment position/precautions:   o Supine in bed  o Bed/Chair-wheels locked  o Bed in low position  o Call light within reach  o RN notified   · Compliance with Program/Exercises: Will assess as treatment progresses. · Recommendations/Intent for next treatment session:  Treatment next visit will focus on increasing Mr. Santana's independence with bed mobility, transfers, gait training, strength/ROM exercises, modalities for pain, and patient education.    Total Treatment Duration:  PT Patient Time In/Time Out  Time In: 1240  Time Out: [address] Jennifer Tejada, ANABEL

## 2020-05-22 NOTE — PROGRESS NOTES
05/21/20 2040   Oxygen Therapy   O2 Sat (%) 96 %   Pulse via Oximetry 134 beats per minute   O2 Device Room air   Patient placed on continuous sat monitor. Monitor history deleted prior to placing on patient. Patient working on IS achieving 1250ml . Good effort, technique. Pep therapy given x 10 breaths at 10 cm H2O press. Very good effort. Roberto well. NAD.

## 2020-05-22 NOTE — PROGRESS NOTES
Orthopedic Joint Progress Note    May 22, 2020  Admit Date: 2020  Admit Diagnosis: Complete tear of right rotator cuff, unspecified whether traumatic [M75.121]  Arthritis of shoulder region, right [M19.011]  Osteoarthritis of right glenohumeral joint [M19.011]  Complete tear of right rotator cuff [M75.121]    1 Day Post-Op    Subjective:     Lucero Santana PATIENT LYING IN BED; Review of Systems: Pertinent items are noted in HPI. Objective:     PT/OT:     PATIENT MOBILITY                           Vital Signs:    Blood pressure 148/76, pulse 98, temperature 97.7 °F (36.5 °C), resp. rate 16, height 6' 1\" (1.854 m), weight 100.2 kg (221 lb), SpO2 96 %.   Temp (24hrs), Av °F (36.7 °C), Min:97.6 °F (36.4 °C), Max:98.8 °F (37.1 °C)      Pain Control:   Pain Assessment  Pain Scale 1: Numeric (0 - 10)  Pain Intensity 1: 9  Pain Onset 1: YRS  Pain Location 1: Shoulder  Pain Orientation 1: Right  Pain Description 1: Aching, Cramping, Sharp, Shooting, Stabbing  Pain Intervention(s) 1: Medication (see MAR)    Meds:  Current Facility-Administered Medications   Medication Dose Route Frequency    tuberculin injection 5 Units  5 Units IntraDERMal ONCE    aspirin chewable tablet 81 mg  81 mg Oral QHS    atorvastatin (LIPITOR) tablet 80 mg  80 mg Oral QHS    hydroCHLOROthiazide (MICROZIDE) capsule 12.5 mg  12.5 mg Oral DAILY    levothyroxine (SYNTHROID) tablet 100 mcg  100 mcg Oral ACB    losartan (COZAAR) tablet 100 mg  100 mg Oral DAILY    metFORMIN (GLUCOPHAGE) tablet 500 mg  500 mg Oral BID WITH MEALS    multivitamin, tx-iron-ca-min (THERA-M w/ IRON) tablet 1 Tab  1 Tab Oral DAILY    bisacodyL (DULCOLAX) suppository 10 mg  10 mg Rectal DAILY PRN    docusate sodium (COLACE) capsule 100 mg  100 mg Oral DAILY    HYDROmorphone (DILAUDID) tablet 2 mg  2 mg Oral Q4H PRN    magnesium citrate solution 296 mL  296 mL Oral DAILY PRN    sodium phosphate (FLEET'S) enema 1 Enema  1 Enema Rectal DAILY PRN    0.9% sodium chloride infusion  75 mL/hr IntraVENous CONTINUOUS    sodium chloride (NS) flush 5-40 mL  5-40 mL IntraVENous Q8H    sodium chloride (NS) flush 5-40 mL  5-40 mL IntraVENous PRN    acetaminophen (TYLENOL) tablet 650 mg  650 mg Oral Q4H PRN    HYDROmorphone (PF) (DILAUDID) injection 1 mg  1 mg IntraVENous Q1H PRN    ceFAZolin (ANCEF) 2 g/20 mL in sterile water IV syringe  2 g IntraVENous Q8H    ondansetron (ZOFRAN) injection 4 mg  4 mg IntraVENous Q4H PRN    pioglitazone (ACTOS) tablet 45 mg  45 mg Oral ACB    benzocaine-menthol (CEPACOL) lozenge  1 Lozenge Oral Q2H PRN        LAB:    Lab Results   Component Value Date/Time    INR 0.9 05/14/2020 12:06 PM     Lab Results   Component Value Date/Time    HGB 11.4 (L) 05/22/2020 04:34 AM    HGB 12.6 (L) 05/14/2020 12:06 PM       Wound Shoulder Right (Active)   Dressing Status Clean, dry, and intact 5/22/2020  3:30 AM   Dressing Type Aquacel 5/22/2020  3:30 AM   Number of days: 1         Physical Exam:  General: alert, cooperative, no distress, appears stated age  Cardiovascular negative  Lungs: negative  Musculoskeletal: INCISION AND DRESSING TO LEFT SHOULDER CLEAN, DRY, INTACT  Neurological: ABLE TO MOVE RIGHT ELBOW, WRIST, AND HAND  Skin:INCISION AND DRESSING TO LEFT SHOULDER CLEAN, DRY, INTACT    Assessment:      Principal Problem:    Osteoarthritis of right glenohumeral joint (5/15/2020)    Active Problems:    Complete tear of right rotator cuff (5/15/2020)         Plan:     Continue PT/OT/Rehab  Consult: Rehab team including PT, OT, recreational therapy, and      [address] [facility] 2 WEEKS

## 2020-05-23 VITALS
HEIGHT: 73 IN | HEART RATE: 114 BPM | BODY MASS INDEX: 29.29 KG/M2 | RESPIRATION RATE: 18 BRPM | TEMPERATURE: 99 F | WEIGHT: 221 LBS | SYSTOLIC BLOOD PRESSURE: 134 MMHG | OXYGEN SATURATION: 93 % | DIASTOLIC BLOOD PRESSURE: 70 MMHG

## 2020-05-23 LAB
ANION GAP SERPL CALC-SCNC: 7 MMOL/L (ref 7–16)
BUN SERPL-MCNC: 64 MG/DL (ref 8–23)
CALCIUM SERPL-MCNC: 8.9 MG/DL (ref 8.3–10.4)
CHLORIDE SERPL-SCNC: 107 MMOL/L (ref 98–107)
CO2 SERPL-SCNC: 22 MMOL/L (ref 21–32)
CREAT SERPL-MCNC: 2.94 MG/DL (ref 0.8–1.5)
ERYTHROCYTE [DISTWIDTH] IN BLOOD BY AUTOMATED COUNT: 13.6 % (ref 11.9–14.6)
GLUCOSE SERPL-MCNC: 141 MG/DL (ref 65–100)
HCT VFR BLD AUTO: 31.4 % (ref 41.1–50.3)
HGB BLD-MCNC: 10.2 G/DL (ref 13.6–17.2)
MAGNESIUM SERPL-MCNC: 2.1 MG/DL (ref 1.8–2.4)
MCH RBC QN AUTO: 32.2 PG (ref 26.1–32.9)
MCHC RBC AUTO-ENTMCNC: 32.5 G/DL (ref 31.4–35)
MCV RBC AUTO: 99.1 FL (ref 79.6–97.8)
NRBC # BLD: 0 K/UL (ref 0–0.2)
PLATELET # BLD AUTO: 187 K/UL (ref 150–450)
PMV BLD AUTO: 10.6 FL (ref 9.4–12.3)
POTASSIUM SERPL-SCNC: 4 MMOL/L (ref 3.5–5.1)
RBC # BLD AUTO: 3.17 M/UL (ref 4.23–5.6)
SODIUM SERPL-SCNC: 136 MMOL/L (ref 136–145)
WBC # BLD AUTO: 9 K/UL (ref 4.3–11.1)

## 2020-05-23 PROCEDURE — 74011250637 HC RX REV CODE- 250/637: Performed by: ORTHOPAEDIC SURGERY

## 2020-05-23 PROCEDURE — 74011250637 HC RX REV CODE- 250/637: Performed by: NURSE PRACTITIONER

## 2020-05-23 PROCEDURE — 85027 COMPLETE CBC AUTOMATED: CPT

## 2020-05-23 PROCEDURE — 83735 ASSAY OF MAGNESIUM: CPT

## 2020-05-23 PROCEDURE — 74011250636 HC RX REV CODE- 250/636: Performed by: NURSE PRACTITIONER

## 2020-05-23 PROCEDURE — 80048 BASIC METABOLIC PNL TOTAL CA: CPT

## 2020-05-23 PROCEDURE — 36415 COLL VENOUS BLD VENIPUNCTURE: CPT

## 2020-05-23 RX ADMIN — PIOGLITAZONE 45 MG: 15 TABLET ORAL at 06:34

## 2020-05-23 RX ADMIN — PIOGLITAZONE 45 MG: 15 TABLET ORAL at 06:30

## 2020-05-23 RX ADMIN — LOSARTAN POTASSIUM 100 MG: 50 TABLET ORAL at 08:27

## 2020-05-23 RX ADMIN — MULTIPLE VITAMINS W/ MINERALS TAB 1 TABLET: TAB at 08:27

## 2020-05-23 RX ADMIN — HYDROMORPHONE HYDROCHLORIDE 1 MG: 1 INJECTION, SOLUTION INTRAMUSCULAR; INTRAVENOUS; SUBCUTANEOUS at 00:00

## 2020-05-23 RX ADMIN — Medication 10 ML: at 06:32

## 2020-05-23 RX ADMIN — LEVOTHYROXINE SODIUM 100 MCG: 0.1 TABLET ORAL at 06:30

## 2020-05-23 RX ADMIN — LEVOTHYROXINE SODIUM 100 MCG: 0.1 TABLET ORAL at 06:34

## 2020-05-23 RX ADMIN — METFORMIN HYDROCHLORIDE 500 MG: 500 TABLET ORAL at 08:26

## 2020-05-23 RX ADMIN — HYDROCHLOROTHIAZIDE 12.5 MG: 12.5 CAPSULE ORAL at 08:27

## 2020-05-23 RX ADMIN — DOCUSATE SODIUM 100 MG: 100 CAPSULE, LIQUID FILLED ORAL at 08:26

## 2020-05-23 NOTE — DISCHARGE INSTRUCTIONS
DISCHARGE SUMMARY from Nurse    PATIENT INSTRUCTIONS:    After general anesthesia or intravenous sedation, for 24 hours or while taking prescription Narcotics:  · Limit your activities  · Do not drive and operate hazardous machinery  · Do not make important personal or business decisions  · Do  not drink alcoholic beverages  · If you have not urinated within 8 hours after discharge, please contact your surgeon on call. Report the following to your surgeon:  · Excessive pain, swelling, redness or odor of or around the surgical area  · Temperature over 100.5  · Nausea and vomiting lasting longer than 4 hours or if unable to take medications  · Any signs of decreased circulation or nerve impairment to extremity: change in color, persistent  numbness, tingling, coldness or increase pain  · Any questions    What to do at Home:  Recommended activity: Activity as tolerated,     If you experience any of the following symptoms, please follow up with  Dr. Anibal Runner. *  Please give a list of your current medications to your Primary Care Provider. *  Please update this list whenever your medications are discontinued, doses are      changed, or new medications (including over-the-counter products) are added. *  Please carry medication information at all times in case of emergency situations. These are general instructions for a healthy lifestyle:    No smoking/ No tobacco products/ Avoid exposure to second hand smoke  Surgeon General's Warning:  Quitting smoking now greatly reduces serious risk to your health.     Obesity, smoking, and sedentary lifestyle greatly increases your risk for illness    A healthy diet, regular physical exercise & weight monitoring are important for maintaining a healthy lifestyle    You may be retaining fluid if you have a history of heart failure or if you experience any of the following symptoms:  Weight gain of 3 pounds or more overnight or 5 pounds in a week, increased swelling in our hands or feet or shortness of breath while lying flat in bed. Please call your doctor as soon as you notice any of these symptoms; do not wait until your next office visit. The discharge information has been reviewed with the patient. The patient verbalized understanding. Discharge medications reviewed with the patient and appropriate educational materials and side effects teaching were provided. ___________________________________________________________________________________________________________________________________    Patient Education        Shoulder Arthroscopy: What to Expect at Home  Your Recovery    Your arm may be in a sling. You will feel tired for several days. Your shoulder will be swollen, and you may notice that your skin is a different color near the cuts the doctor made (incisions). Your hand and arm may also be swollen. This is normal and will go away in a few days. Depending on the medicine you had during the surgery, your entire arm may feel numb or like you cannot move it. This goes away in 12 to 24 hours. When you can return to work or your usual routine will depend on your shoulder problem. Most people need 6 weeks or longer to recover. How much time you need depends on the surgery that was done. You may have to limit your activity until your shoulder strength and range of motion return to normal. You may also be in a rehabilitation program (rehab). If you have a desk job, you may be able to return to work a few days after the surgery. If you lift things at work, it may take months before you return to work. This care sheet gives you a general idea about how long it will take for you to recover. But each person recovers at a different pace. Follow the steps below to get better as quickly as possible. How can you care for yourself at home? Activity    · Rest when you feel tired. Getting enough sleep will help you recover.  You may be more comfortable if you sleep in a reclining chair. To make your arm and shoulder feel better, keep a thin pillow under the back of your arm while you are lying down.     · Try to walk each day. Start by walking a little more than you did the day before. Bit by bit, increase the amount you walk. Walking boosts blood flow and helps prevent pneumonia and constipation.     · For 2 to 3 weeks, avoid lifting anything heavier than a plate or a glass. You need to give your shoulder time to heal.     · Your arm may be in a sling. You may need to use the sling for a few days to a few weeks. Your doctor will advise you on how long to wear the sling.     · You may take the sling off when you dress or wash.     · Do not use your arm for repeated movements. These include painting, vacuuming, or using a computer. Diet    · You can eat your normal diet. If your stomach is upset, try bland, low-fat foods like plain rice, broiled chicken, toast, and yogurt.     · Drink plenty of fluids, unless your doctor tells you not to.     · You may notice that your bowel movements are not regular right after your surgery. This is common. Try to avoid constipation and straining with bowel movements. You may want to take a fiber supplement every day. If you have not had a bowel movement after a couple of days, ask your doctor about taking a mild laxative. Medicines    · Your doctor will tell you if and when you can restart your medicines. He or she will also give you instructions about taking any new medicines.     · If you take aspirin or some other blood thinner, ask your doctor if and when to start taking it again. Make sure that you understand exactly what your doctor wants you to do.     · Take pain medicines exactly as directed. ? If the doctor gave you a prescription medicine for pain, take it as prescribed.   ? If you are not taking a prescription pain medicine, ask your doctor if you can take an over-the-counter medicine.     · If you think your pain medicine is making you sick to your stomach:  ? Take your medicine after meals (unless your doctor has told you not to). ? Ask your doctor for a different pain medicine.     · If your doctor prescribed antibiotics, take them as directed. Do not stop taking them just because you feel better. You need to take the full course of antibiotics. Incision care    · If you have a dressing over your incision, keep it clean and dry. You may remove it 2 to 3 days after the surgery.     · If your incision is open to the air, keep the area clean and dry.     · If you have strips of tape on the incision, leave the tape on for a week or until it falls off. Exercise    · You may need rehabilitation. This is a series of exercises you do after your surgery. Rehab helps you get back your shoulder's range of motion and strength. You will work with your doctor and physical therapist to plan this exercise program. To get the best results, you need to do the exercises correctly and as often and as long as your doctor tells you. Ice    · To reduce swelling and pain, put ice or a cold pack on your shoulder for 10 to 20 minutes at a time. Do this every 1 to 2 hours. Put a thin cloth between the ice and your skin. Follow-up care is a key part of your treatment and safety. Be sure to make and go to all appointments, and call your doctor if you are having problems. It's also a good idea to know your test results and keep a list of the medicines you take. When should you call for help? Call 911 anytime you think you may need emergency care.  For example, call if:    · You passed out (lost consciousness).     · You have severe trouble breathing.     · You have sudden chest pain and shortness of breath, or you cough up blood.    Call your doctor now or seek immediate medical care if:    · Your hand is cool, pale, or numb, or it changes color.     · You are unable to move your fingers, wrist, or elbow.     · You are sick to your stomach or cannot keep fluids down.     · You have pain that does not get better after you take pain medicine.     · You have signs of infection, such as:  ? Increased pain, swelling, warmth, or redness. ? Red streaks leading from the incision. ? Pus draining from the incision. ? A fever.     · You have loose stitches, or your incision comes open.     · Your incision bleeds through your first dressing or is still bleeding 3 days after your surgery.    Watch closely for changes in your health, and be sure to contact your doctor if:    · Your sling feels too tight, and you cannot loosen it.     · You have new or increased swelling in your arm.     · You have new pain that develops in another area of the affected limb. For example, you have pain in your hand or elbow.     · You do not have a bowel movement after taking a laxative.     · You do not get better as expected. Where can you learn more? Go to http://eamon-darrina.info/  Enter B084 in the search box to learn more about \"Shoulder Arthroscopy: What to Expect at Home. \"  Current as of: June 26, 2019Content Version: 12.4  © 4650-4851 [employer]. Care instructions adapted under license by [employer] (which disclaims liability or warranty for this information). If you have questions about a medical condition or this instruction, always ask your healthcare professional. [address] any warranty or liability for your use of this information.

## 2020-05-23 NOTE — PROGRESS NOTES
Care Management Interventions  PCP Verified by CM:  Yes  Mode of Transport at Discharge: Self  Transition of Care Consult (CM Consult): [address]: Yes  Discharge Durable Medical Equipment: No  Physical Therapy Consult: Yes  Occupational Therapy Consult: Yes  Current Support Network: Lives with Spouse  Confirm Follow Up Transport: Family  The Plan for Transition of Care is Related to the Following Treatment Goals : return to independent function  The Patient and/or Patient Representative was Provided with a Choice of Provider and Agrees with the Discharge Plan?: Yes  Freedom of Choice List was Provided with Basic Dialogue that Supports the Patient's Individualized Plan of Care/Goals, Treatment Preferences and Shares the Quality Data Associated with the Providers?: Yes  Discharge Location  Discharge Placement: Home with home health

## 2020-05-23 NOTE — PROGRESS NOTES
Orthopedic Joint Progress Note    May 23, 2020  Admit Date: 2020  Admit Diagnosis: Complete tear of right rotator cuff, unspecified whether traumatic [M75.121]  Arthritis of shoulder region, right [M19.011]  Osteoarthritis of right glenohumeral joint [M19.011]  Complete tear of right rotator cuff [M75.121]    2 Days Post-Op    Subjective:     Dominique Cox doing well      Objective:     PT/OT:     PATIENT MOBILITY    Bed Mobility  Supine to Sit: Stand-by assistance  Sit to Supine: Stand-by assistance  Scooting: Stand-by assistance  Transfers  Sit to Stand: Stand-by assistance  Stand to Sit: Stand-by assistance  Bed to Chair: Stand-by assistance      Gait  Speed/Kenia: Delayed  Step Length: Left shortened, Right shortened  Gait Abnormalities: Decreased step clearance(mild sway)  Ambulation - Level of Assistance: Stand-by assistance  Distance (ft): 300 Feet (ft)  Assistive Device: (none)  Rail Use: Left   Stairs - Level of Assistance: Contact guard assistance  Number of Stairs Trained: 2  Duration: 11 Minutes            Vital Signs:    Blood pressure 134/70, pulse (!) 114, temperature 99 °F (37.2 °C), resp. rate 18, height 6' 1\" (1.854 m), weight 100.2 kg (221 lb), SpO2 93 %.   Temp (24hrs), Av.3 °F (36.8 °C), Min:97.7 °F (36.5 °C), Max:99 °F (37.2 °C)      Pain Control:   Pain Assessment  Pain Scale 1: Numeric (0 - 10)  Pain Intensity 1: 2  Pain Onset 1: YRS  Pain Location 1: Shoulder  Pain Orientation 1: Right  Pain Description 1: Constant  Pain Intervention(s) 1: Repositioned    Meds:  Current Facility-Administered Medications   Medication Dose Route Frequency    tuberculin injection 5 Units  5 Units IntraDERMal ONCE    aspirin chewable tablet 81 mg  81 mg Oral QHS    atorvastatin (LIPITOR) tablet 80 mg  80 mg Oral QHS    hydroCHLOROthiazide (MICROZIDE) capsule 12.5 mg  12.5 mg Oral DAILY    levothyroxine (SYNTHROID) tablet 100 mcg  100 mcg Oral ACB    losartan (COZAAR) tablet 100 mg  100 mg Oral DAILY    metFORMIN (GLUCOPHAGE) tablet 500 mg  500 mg Oral BID WITH MEALS    multivitamin, tx-iron-ca-min (THERA-M w/ IRON) tablet 1 Tab  1 Tab Oral DAILY    bisacodyL (DULCOLAX) suppository 10 mg  10 mg Rectal DAILY PRN    docusate sodium (COLACE) capsule 100 mg  100 mg Oral DAILY    HYDROmorphone (DILAUDID) tablet 2 mg  2 mg Oral Q4H PRN    magnesium citrate solution 296 mL  296 mL Oral DAILY PRN    sodium phosphate (FLEET'S) enema 1 Enema  1 Enema Rectal DAILY PRN    sodium chloride (NS) flush 5-40 mL  5-40 mL IntraVENous Q8H    sodium chloride (NS) flush 5-40 mL  5-40 mL IntraVENous PRN    acetaminophen (TYLENOL) tablet 650 mg  650 mg Oral Q4H PRN    HYDROmorphone (PF) (DILAUDID) injection 1 mg  1 mg IntraVENous Q1H PRN    ondansetron (ZOFRAN) injection 4 mg  4 mg IntraVENous Q4H PRN    pioglitazone (ACTOS) tablet 45 mg  45 mg Oral ACB    benzocaine-menthol (CEPACOL) lozenge  1 Lozenge Oral Q2H PRN        LAB:    Lab Results   Component Value Date/Time    INR 0.9 05/14/2020 12:06 PM     Lab Results   Component Value Date/Time    HGB 10.2 (L) 05/23/2020 04:25 AM    HGB 11.4 (L) 05/22/2020 04:34 AM    HGB 12.6 (L) 05/14/2020 12:06 PM       Wound Shoulder Right (Active)   Dressing Status Clean, dry, and intact 5/23/2020  3:15 AM   Dressing Type Aquacel 5/23/2020  3:15 AM   Splint Type/Material Sling 5/23/2020 12:00 AM   Number of days: 2         Physical Exam:  No significant changes  Dressing c/d/i  Sling    Assessment:      Principal Problem:    Osteoarthritis of right glenohumeral joint (5/15/2020)    Active Problems:    Complete tear of right rotator cuff (5/15/2020)         Plan:     Continue PT/OT/Rehab  D/c home  Patient Expects to be Discharged to[de-identified] Private residence

## 2020-05-24 LAB
ABO + RH BLD: NORMAL
BLD PROD TYP BPU: NORMAL
BLOOD GROUP ANTIBODIES SERPL: NORMAL
BPU ID: NORMAL
CROSSMATCH RESULT,%XM: NORMAL
SPECIMEN EXP DATE BLD: NORMAL
STATUS OF UNIT,%ST: NORMAL
UNIT DIVISION, %UDIV: 0

## 2020-05-24 NOTE — DISCHARGE SUMMARY
[address] [facility] SUMMARY    Name:  Noah Robles  MR#:  [id number]  :  1945  ACCOUNT #:  [de-identified]  ADMIT DATE:  2020  DISCHARGE DATE:  2020    ADMISSION DIAGNOSES:  1. Glenohumeral osteoarthritis, right shoulder. 2.  Rotator cuff tear, right shoulder. DISCHARGE DIAGNOSES:  1. Glenohumeral osteoarthritis, right shoulder. 2.  Rotator cuff tear, right shoulder. Please see H and P, operative summary, and consult for details. HOSPITAL COURSE:  The patient is a 52-year-old gentleman, who was admitted on 2020, underwent an uncomplicated reverse right total shoulder arthroplasty with a Delta Xtend prosthesis and biceps tenodesis. On postoperative day #1, hemoglobin was 11.4, potassium was 4.7, creatinine was 2.84, which is his baseline. He was having significant pain requiring oral and IV narcotic pain medication. On postoperative day #2, he was much more comfortable, all labs were within normal limits. He was discharged home on postoperative day #2. He will continue therapy on the outside and follow up in my office in 2 weeks. MD JACKIE Haywood/[id number]  D:  2020 14:42  T:  2020 15:28  JOB #:  [id number]  CC:   Hilary Everett MD

## 2020-05-25 ENCOUNTER — PATIENT OUTREACH (OUTPATIENT)
Dept: CASE MANAGEMENT | Age: 75
End: 2020-05-25

## 2020-05-25 ENCOUNTER — HOME CARE VISIT (OUTPATIENT)
Dept: SCHEDULING | Facility: HOME HEALTH | Age: 75
End: 2020-05-25
Payer: MEDICARE

## 2020-05-25 VITALS
DIASTOLIC BLOOD PRESSURE: 62 MMHG | HEART RATE: 93 BPM | SYSTOLIC BLOOD PRESSURE: 123 MMHG | RESPIRATION RATE: 17 BRPM | TEMPERATURE: 97.6 F

## 2020-05-25 PROCEDURE — 3331090002 HH PPS REVENUE DEBIT

## 2020-05-25 PROCEDURE — G0151 HHCP-SERV OF PT,EA 15 MIN: HCPCS

## 2020-05-25 PROCEDURE — 400013 HH SOC

## 2020-05-25 PROCEDURE — 3331090001 HH PPS REVENUE CREDIT

## 2020-05-25 NOTE — PROGRESS NOTES
Patient contacted regarding recent discharge and COVID-19 risk. Discussed COVID-19 related testing which was available at this time. Test results were negative. Patient informed of results, if available? Patient notifieid prior to planned surgery     LPN Community Care Coordinator contacted the patient by telephone to perform post discharge assessment. Verified name and  with patient as identifiers. Patient has following risk factors of: s/p REVERSE RIGHT TOTAL SHOULDER ARTHROPLASTY WITH DELTA EXTEND PROSTHESIS, BICEPS TENODESIS; no other known risk factors. CCC reviewed discharge instructions, medical action plan and red flags related to discharge diagnosis. Reviewed and educated them on any new and changed medications related to discharge diagnosis. Patient reports weak urine stream since surgery, denies pain or fever. Advised to call PCP if symptoms worsen or do not resolve. Patient stated understanding. Education provided regarding infection prevention, and signs and symptoms of COVID-19 and when to seek medical attention with patient who verbalized understanding. Discussed exposure protocols and quarantine from [address] you at higher risk for severe illness  and given an opportunity for questions and concerns. The patient agrees to contact the COVID-19 hotline [phone] or PCP office for questions related to their healthcare. CTN/ACM provided contact information for future reference. From CDC: Are you at higher risk for severe illness?  Wash your hands often.  Avoid close contact (6 feet, which is about two arm lengths) with people who are sick.  Put distance between yourself and other people if COVID-19 is spreading in your community.  Clean and disinfect frequently touched surfaces.  Avoid all cruise travel and non-essential air travel.    Call your healthcare professional if you have concerns about COVID-19 and your underlying condition or if you are sick.    For more information on steps you can take to protect yourself, see CDC's How to Protect Yourself      Patient/family/caregiver given information for [employer] Loop and agrees to enroll no  Patient's preferred e-mail:     Patient's preferred phone number: Based on Loop alert triggers, patient will be contacted by nurse care manager for worsening symptoms. Plan for follow-up call in 7-14 days based on severity of symptoms and risk factors.

## 2020-05-26 PROCEDURE — 3331090002 HH PPS REVENUE DEBIT

## 2020-05-26 PROCEDURE — 3331090001 HH PPS REVENUE CREDIT

## 2020-05-27 ENCOUNTER — HOME CARE VISIT (OUTPATIENT)
Dept: SCHEDULING | Facility: HOME HEALTH | Age: 75
End: 2020-05-27
Payer: MEDICARE

## 2020-05-27 PROCEDURE — 3331090001 HH PPS REVENUE CREDIT

## 2020-05-27 PROCEDURE — G0151 HHCP-SERV OF PT,EA 15 MIN: HCPCS

## 2020-05-27 PROCEDURE — 3331090002 HH PPS REVENUE DEBIT

## 2020-05-28 ENCOUNTER — HOME CARE VISIT (OUTPATIENT)
Dept: SCHEDULING | Facility: HOME HEALTH | Age: 75
End: 2020-05-28
Payer: MEDICARE

## 2020-05-28 PROCEDURE — 3331090002 HH PPS REVENUE DEBIT

## 2020-05-28 PROCEDURE — 3331090001 HH PPS REVENUE CREDIT

## 2020-05-28 PROCEDURE — G0152 HHCP-SERV OF OT,EA 15 MIN: HCPCS

## 2020-05-29 ENCOUNTER — HOME CARE VISIT (OUTPATIENT)
Dept: SCHEDULING | Facility: HOME HEALTH | Age: 75
End: 2020-05-29
Payer: MEDICARE

## 2020-05-29 VITALS
HEART RATE: 76 BPM | SYSTOLIC BLOOD PRESSURE: 120 MMHG | RESPIRATION RATE: 18 BRPM | TEMPERATURE: 97.9 F | DIASTOLIC BLOOD PRESSURE: 70 MMHG

## 2020-05-29 VITALS
RESPIRATION RATE: 17 BRPM | HEART RATE: 113 BPM | SYSTOLIC BLOOD PRESSURE: 142 MMHG | DIASTOLIC BLOOD PRESSURE: 80 MMHG | TEMPERATURE: 98.3 F

## 2020-05-29 PROCEDURE — 3331090002 HH PPS REVENUE DEBIT

## 2020-05-29 PROCEDURE — G0157 HHC PT ASSISTANT EA 15: HCPCS

## 2020-05-29 PROCEDURE — 3331090001 HH PPS REVENUE CREDIT

## 2020-05-30 PROCEDURE — 3331090001 HH PPS REVENUE CREDIT

## 2020-05-30 PROCEDURE — 3331090002 HH PPS REVENUE DEBIT

## 2020-05-31 PROCEDURE — 3331090002 HH PPS REVENUE DEBIT

## 2020-05-31 PROCEDURE — 3331090001 HH PPS REVENUE CREDIT

## 2020-06-01 ENCOUNTER — HOME CARE VISIT (OUTPATIENT)
Dept: SCHEDULING | Facility: HOME HEALTH | Age: 75
End: 2020-06-01
Payer: MEDICARE

## 2020-06-01 VITALS
HEART RATE: 78 BPM | SYSTOLIC BLOOD PRESSURE: 134 MMHG | DIASTOLIC BLOOD PRESSURE: 60 MMHG | TEMPERATURE: 97.9 F | OXYGEN SATURATION: 99 % | RESPIRATION RATE: 16 BRPM

## 2020-06-01 PROCEDURE — 3331090001 HH PPS REVENUE CREDIT

## 2020-06-01 PROCEDURE — G0152 HHCP-SERV OF OT,EA 15 MIN: HCPCS

## 2020-06-01 PROCEDURE — 3331090002 HH PPS REVENUE DEBIT

## 2020-06-02 ENCOUNTER — HOME CARE VISIT (OUTPATIENT)
Dept: HOME HEALTH SERVICES | Facility: HOME HEALTH | Age: 75
End: 2020-06-02
Payer: MEDICARE

## 2020-06-02 ENCOUNTER — HOME CARE VISIT (OUTPATIENT)
Dept: SCHEDULING | Facility: HOME HEALTH | Age: 75
End: 2020-06-02
Payer: MEDICARE

## 2020-06-02 VITALS
HEART RATE: 89 BPM | TEMPERATURE: 98.3 F | SYSTOLIC BLOOD PRESSURE: 142 MMHG | RESPIRATION RATE: 17 BRPM | DIASTOLIC BLOOD PRESSURE: 70 MMHG

## 2020-06-02 PROCEDURE — 3331090001 HH PPS REVENUE CREDIT

## 2020-06-02 PROCEDURE — 3331090002 HH PPS REVENUE DEBIT

## 2020-06-02 PROCEDURE — G0157 HHC PT ASSISTANT EA 15: HCPCS

## 2020-06-03 PROCEDURE — 3331090001 HH PPS REVENUE CREDIT

## 2020-06-03 PROCEDURE — 3331090002 HH PPS REVENUE DEBIT

## 2020-06-04 ENCOUNTER — HOME CARE VISIT (OUTPATIENT)
Dept: SCHEDULING | Facility: HOME HEALTH | Age: 75
End: 2020-06-04
Payer: MEDICARE

## 2020-06-04 VITALS
HEART RATE: 80 BPM | OXYGEN SATURATION: 98 % | SYSTOLIC BLOOD PRESSURE: 128 MMHG | RESPIRATION RATE: 16 BRPM | DIASTOLIC BLOOD PRESSURE: 72 MMHG | TEMPERATURE: 97.7 F

## 2020-06-04 PROCEDURE — G0152 HHCP-SERV OF OT,EA 15 MIN: HCPCS

## 2020-06-04 PROCEDURE — 3331090001 HH PPS REVENUE CREDIT

## 2020-06-04 PROCEDURE — G0151 HHCP-SERV OF PT,EA 15 MIN: HCPCS

## 2020-06-04 PROCEDURE — 3331090002 HH PPS REVENUE DEBIT

## 2020-06-05 ENCOUNTER — PATIENT OUTREACH (OUTPATIENT)
Dept: CASE MANAGEMENT | Age: 75
End: 2020-06-05

## 2020-06-05 ENCOUNTER — HOSPITAL ENCOUNTER (OUTPATIENT)
Dept: PHYSICAL THERAPY | Age: 75
Discharge: HOME | End: 2020-06-05
Payer: MEDICARE

## 2020-06-05 VITALS
SYSTOLIC BLOOD PRESSURE: 128 MMHG | RESPIRATION RATE: 18 BRPM | HEART RATE: 81 BPM | TEMPERATURE: 97.7 F | DIASTOLIC BLOOD PRESSURE: 72 MMHG

## 2020-06-05 PROCEDURE — 97162 PT EVAL MOD COMPLEX 30 MIN: CPT

## 2020-06-05 PROCEDURE — 3331090002 HH PPS REVENUE DEBIT

## 2020-06-05 PROCEDURE — 97016 VASOPNEUMATIC DEVICE THERAPY: CPT

## 2020-06-05 PROCEDURE — 3331090001 HH PPS REVENUE CREDIT

## 2020-06-05 PROCEDURE — 97110 THERAPEUTIC EXERCISES: CPT

## 2020-06-05 NOTE — PROGRESS NOTES
Patient resolved from Transition of Care episode on 5/25/20  Discussed COVID-19 related testing which was available at this time. Test results were negative. Patient informed of results, if available? Notified prior to surgery as previously noted     Patient/family has been provided the following resources and education related to COVID-19:                         Signs, symptoms and red flags related to COVID-19            CDC exposure and quarantine guidelines            [facility] exposure contact - [phone]            Contact for their local Department of Health                 Patient currently reports that the following symptoms have improved:  no risk identified. No further outreach scheduled with this CTN/ACM/LPN/HC/ MA. Episode of Care resolved. Patient has this CTN/ACM/LPN/HC/MA contact information if future needs arise.

## 2020-06-05 NOTE — PROGRESS NOTES
Margo Traore  : 1945  [place]  Secondary[place] [employer] [facility] [address] @ [address] [address].  [phone]   [phone]      OUTPATIENT PHYSICAL THERAPY: Daily Treatment Note 2020  Visit Count:  1    ICD-10: Treatment Diagnosis: Pain in right shoulder (M25.511) and Stiffness of right shoulder, not elsewhere classified (M25.611)  TREATMENT PLAN:  Effective Dates: 2020 TO 9/3/2020 (90 days). Frequency/Duration: 1 time a week for 30 Day(s), 2 times a week for next 60 days  GOALS: (Goals have been discussed and agreed upon with patient.)  Short-Term Functional Goals: Time Frame: 4 weeks  # Goal Status   1 Pt will confirm compliance with home program to facilitate improvement in function. NEW     Discharge goals: Time frame: 12 weeks  # Goal Status   1 Pt will be able to elevate UE to at least 115° without symptoms in order to participate in ADLs such as reaching overhead and pulling shirt on. NEW   2 Pt will be able to reach over head to touch superior angle of opposite scapula without symptoms to be able to participate in ADLs such as reaching overhead and washing head. NEW   3 Pt will be able to reach across body to touch spine of opposite scapula without symptoms to be able to participate in ADLs such as reaching tasks. NEW   4 Pt will be able to reach behind back to touch lower back without symptoms to be able to participate in dressing and toileting activities. NEW   5 Pt will be able to reach behind themselves with arm elevated without symptoms to be able to participate in ADLs such as reaching tasks and grabbing seat belt. NEW       Pre-treatment Symptoms/Complaints:  Pt reports his shoulder has been painful but has been improving with time. He confirms he has been doing exercises from home health.   Pain: Initial:   -2/10 Post Session:  3/10   Medications Last Reviewed: 6/5/2020  Updated Objective Findings: Below measures from initial evaluation unless otherwise noted. 6/5/20  Pain at worst: 7/10  Observation: Incision healing well  ROM:      Left Right   Elevation (°) 141 PROM to 90 per visual estimation   Behind neck reach To TLJ Not tested   Behind back reach To opposite scapula  Not tested    Cross body reach To opposite scapula Not tested   R ER to neutral in adduction. UE ANDT: All within normal limits B  DASH: 45/55    TREATMENT:   THERAPEUTIC ACTIVITY: (see chart for minutes): Therapeutic activities per grid below to improve mobility, strength, balance and coordination. Required minimal visual, verbal and tactile cues to improve independence with functional mobility and activities. THERAPEUTIC EXERCISE: (see chart for minutes):  Exercises per grid below to improve mobility, strength, balance and coordination. Required minimal visual, verbal and tactile cues to promote proper body alignment and promote proper body mechanics. Progressed resistance, range, repetitions and complexity of movement as indicated. NEUROMUSCULAR RE-EDUCATION: (see chart for minutes):  Exercise/activities per grid below to improve balance, coordination, kinesthetic sense, posture, pain, and proprioception. Required minimal visual, verbal and tactile cues to promote motor control of right, upper extremity(s). MANUAL THERAPY: (see chart for minutes): Joint mobilization, Soft tissue mobilization, skin mobilization, and muscle energy techniques were utilized and necessary because of the patient's restricted joint motion, restricted motion of soft tissue and pain . MODALITIES: (see chart for minutes):      *  Cold Pack Therapy in order to provide analgesia. Vasopneumatic compression with cryotherapy to control pain and swelling s/p R rTSA.     Date: 6/5/20 (visit 1)       Modalities: 15 min        Game ready: 13' to R shoulder on medium compression, coldest setting       Therapeutic Exercise: 15 min        Shoulder isometrics: x3 holds R into flexion and abduction  Pulley into flexion: x5 with pt given cues to limit to 90 ° flexion, around height of hand to top of head. Pt educated on home program implementation and given printout. Reviewed his exercises from home health. Pendulum: x10 CW, CCW, A/P, and laterally  Elbow flexion: x5 with full extension achieved  Scapular elevation: x3 holds  Scapular retraction: x3 holds       Neuromuscular re-education                Manual Therapy:                Therapeutic Activities:                  Home program: 6/5/20: add pulley flexion and isometric (flexion and abduction) to home program    [employer] Portal  Treatment/Session Summary:    · Response to Treatment: Pt tolerated exercises well and confirmed understanding of being conservative with exercise intensity to avoid overloading tissues. · Communication/Consultation:  None today  · Equipment provided today:  6/5/20: doorway pulley  · Recommendations/Intent for next treatment session: Next visit will focus on R shoulder ROM, strength, and pain.     Total Treatment Billable Duration:  55 minutes  PT Patient Time In/Time Out  Time In: 1400  Time Out: 8 [facility] Enoc Gottlieb PT, DPT    Future Appointments   Date Time Provider [place]   6/10/2020  2:45 PM Baltazar Nelson [facility]   6/17/2020 10:15 AM Baltazar FARRELL [facility]   6/24/2020 10:15 AM Baltazar FARRELL [facility]

## 2020-06-05 NOTE — THERAPY EVALUATION
Karen Maciel  : 1945  [place]  [place] [employer] [address] @ [address] [place] [address]  [phone]   [phone]       OUTPATIENT PHYSICAL THERAPY:Initial Assessment 2020   ICD-10: Treatment Diagnosis: Pain in right shoulder (M25.511) and Stiffness of right shoulder, not elsewhere classified (M25.611)  Precautions/Allergies:   Patient has no known allergies. Ambulatory/Rehab Services H2 Model Falls Risk Assessment    Risk Factors:       (1)  Gender [Male] Ability to Rise from Chair:       (0)  Ability to rise in a single movement    Falls Prevention Plan:       Physical Limitations to Exercise (specify):  R shoulder precautions s/p R rTSA   Total: (5 or greater = High Risk): 1     [facility] [employer]. All Rights Reserved. [facility] [employer] Patent #[id number]. Federal Law prohibits the replication, distribution or use without written permission from [employer]      MEDICAL/REFERRING DIAGNOSIS:  S/P reverse total shoulder arthroplasty, right [Z96.611]   DATE OF SURGERY: 20  REFERRING PHYSICIAN: Karen Parra MD MD Orders: evaluate and treat   Return MD Appointment: TBD   INITIAL ASSESSMENT:  Mr. Noe Merritt presents with impaired strength, ROM and pain of right shoulder  secondary to s/p R rTSA. This limits reaching and lifting tolerance and restricts ability to participate in ADLs, functional mobility and recreational activities. . Patient would benefit from skilled physical therapy to address above impairments. PROBLEM LIST (Impacting functional limitations):  1. Decreased Strength  2. Decreased ADL/Functional Activities  3. Increased Pain  4. Decreased Activity Tolerance  5. Decreased Flexibility/Joint Mobility INTERVENTIONS PLANNED: (Treatment may consist of any combination of the following)  1. Cryotherapy  2. Electrical Stimulation  3. Heat  4.  Home Exercise Program (HEP)  5. Manual Therapy  6. Neuromuscular Re-education/Strengthening  7. Therapeutic Activites  8. Therapeutic Exercise/Strengthening   TREATMENT PLAN:  Effective Dates: 6/5/2020 TO 9/3/2020 (90 days). Frequency/Duration: 1 time a week for 30 Day(s), 2 times a week for next 60 days  GOALS: (Goals have been discussed and agreed upon with patient.)  Short-Term Functional Goals: Time Frame: 4 weeks  # Goal Status   1 Pt will confirm compliance with home program to facilitate improvement in function. NEW     Discharge goals: Time frame: 12 weeks  # Goal Status   1 Pt will be able to elevate UE to at least 115° without symptoms in order to participate in ADLs such as reaching overhead and pulling shirt on. NEW   2 Pt will be able to reach over head to touch superior angle of opposite scapula without symptoms to be able to participate in ADLs such as reaching overhead and washing head. NEW   3 Pt will be able to reach across body to touch spine of opposite scapula without symptoms to be able to participate in ADLs such as reaching tasks. NEW   4 Pt will be able to reach behind back to touch lower back without symptoms to be able to participate in dressing and toileting activities. NEW   5 Pt will be able to reach behind themselves with arm elevated without symptoms to be able to participate in ADLs such as reaching tasks and grabbing seat belt. NEW       Rehabilitation Potential For Stated Goals: Good  Regarding Mario Santana's therapy, I certify that the treatment plan above will be carried out by a therapist or under their direction. Thank you for this referral,  Prabhu Aburto, PT, DPT     Referring Physician Signature: Odessa Christianson MD _______________________________ Date _____________     HISTORY:   History of Injury/Illness (Reason for Referral):  Pt had previous R shoulder pain and been to PT for this. He continued to have shoulder pain so had R rTSA on 5/21/20.  He had home health for PT and OT for 2 weeks which ended 6/4/20. He arrives today in sling and verbalized his precautions correctly. He denies persistent numbness/tingling, with occasional numb feeling in hand that only lasts a short while. He reports he only uses OTC medication and ice for pain control now. He would like to return to swimming, golfing, and normal ADLs. Past Medical History/Comorbidities:   Mr. Ricci Umanzor  has a past medical history of Arthritis, Carotid artery disease ([facility] [address] Congenital absence of one kidney, Diabetes ([facility] [address] Essential hypertension, Hyperlipidemia, and Thyroid disease. Mr. Ricci Umanzor  has a past surgical history that includes hx carotid endarterectomy (01/28/2019); hx cataract removal (Bilateral); hx hernia repair; hx orthopaedic (2009); and hx colonoscopy. Social History/Living Environment:     Pt lives with spouse/significant other in two-story home with stairs in home and walk-in shower. Prior Level of Function/Work/Activity:  Pt does not work. Pt had unrestricted prior level of function. Current Medications:       Current Outpatient Medications:     docusate sodium (COLACE) 50 mg capsule, Take 50 mg by mouth two (2) times daily as needed for Constipation. , Disp: , Rfl:     acetaminophen (TYLENOL) 500 mg tablet, Take 500 mg by mouth every six (6) hours as needed for Pain., Disp: , Rfl:     HYDROmorphone (DILAUDID) 2 mg tablet, Take 2 mg by mouth every four (4) hours as needed for Pain., Disp: , Rfl:     omega 3-dha-epa-fish oil (Fish Oil) 100-160-1,000 mg cap, Take 1 Tab by mouth daily. , Disp: , Rfl:     metFORMIN (GLUCOPHAGE) 500 mg tablet, Take 500 mg by mouth two (2) times a day., Disp: , Rfl:     pioglitazone (ACTOS) 45 mg tablet, TAKE 1 TABLET BY MOUTH ONCE DAILY, Disp: , Rfl:     losartan (COZAAR) 100 mg tablet, TAKE 1 TABLET BY MOUTH ONCE DAILY IN THE MORNING, Disp: , Rfl:     hydroCHLOROthiazide (MICROZIDE) 12.5 mg capsule, TAKE 1 CAPSULE BY MOUTH ONCE DAILY, Disp: , Rfl:     aspirin 81 mg chewable tablet, Take 81 mg by mouth nightly., Disp: , Rfl:     atorvastatin (LIPITOR) 80 mg tablet, TAKE 1 TABLET BY MOUTH ONCE DAILY, Disp: , Rfl:     levothyroxine (SYNTHROID) 100 mcg tablet, TAKE 1 TABLET BY MOUTH ONCE DAILY IN THE MORNING, Disp: , Rfl:     coenzyme Q-10 (CO Q-10) 200 mg capsule, Take 200 mg by mouth daily. , Disp: , Rfl:     multivitamin (ONE A DAY) tablet, Take 1 Tab by mouth daily. , Disp: , Rfl:     flaxseed oil (OMEGA 3 PO), Take 1 Tab by mouth daily. , Disp: , Rfl:    Date Last Reviewed:  6/5/2020    Number of Personal Factors/Comorbidities that affect the Plan of Care: 1-2: MODERATE COMPLEXITY   EXAMINATION:   Pain at worst: 7/10  Observation: Incision healing well  ROM:      Left Right   Elevation (°) 141 PROM to 90 per visual estimation   Behind neck reach To TLJ Not tested   Behind back reach To opposite scapula  Not tested    Cross body reach To opposite scapula Not tested   R ER to neutral in adduction. UE ANDT: All within normal limits B   Body Structures Involved:  1. Nerves  2. Joints  3. Muscles Body Functions Affected:  1. Sensory/Pain  2. Neuromusculoskeletal  3. Movement Related Activities and Participation Affected:  1. General Tasks and Demands  2. Self Care  3. Community, Social and [place]   Number of elements (examined above) that affect the Plan of Care: 3: MODERATE COMPLEXITY   CLINICAL PRESENTATION:   Presentation: Evolving clinical presentation with changing clinical characteristics: MODERATE COMPLEXITY     CLINICAL DECISION MAKING:      OUTCOME MEASURE:   Initial outcome measure performed on 6/5/2020. Tool Used: Disabilities of the Arm, Shoulder and Hand (DASH) Questionnaire - Quick Version  Score:  Initial: 45/55  Most Recent: X/55 (Date: -- )   Interpretation of Score: The DASH is designed to measure the activities of daily living in person's with upper extremity dysfunction or pain.   Each section is scored on a 1-5 scale, 5 representing the greatest disability. The scores of each section are added together for a total score of 55. MEDICAL NECESSITY:   · Patient will benefit from skilled physical therapy to address their previously listed impairments in order to improve their independence with functional activities painfree. REASON FOR SERVICES/OTHER COMMENTS:  · Patient requires present interventions due to patient's inability to perform functional and recreational activities painfree.    Use of outcome tool(s) and clinical judgement create a POC that gives a: Questionable prediction of patient's progress: MODERATE COMPLEXITY        Total Duration: 50 min  PT Patient Time In/Time Out  Time In: 1400  Time Out: 55 Eri Kimil, PT, DPT

## 2020-06-06 PROCEDURE — 3331090001 HH PPS REVENUE CREDIT

## 2020-06-06 PROCEDURE — 3331090002 HH PPS REVENUE DEBIT

## 2020-06-07 PROCEDURE — 3331090001 HH PPS REVENUE CREDIT

## 2020-06-07 PROCEDURE — 3331090002 HH PPS REVENUE DEBIT

## 2020-06-08 ENCOUNTER — APPOINTMENT (OUTPATIENT)
Dept: PHYSICAL THERAPY | Age: 75
End: 2020-06-08
Payer: MEDICARE

## 2020-06-10 ENCOUNTER — HOSPITAL ENCOUNTER (OUTPATIENT)
Dept: PHYSICAL THERAPY | Age: 75
Discharge: HOME | End: 2020-06-10
Payer: MEDICARE

## 2020-06-10 PROCEDURE — 97016 VASOPNEUMATIC DEVICE THERAPY: CPT

## 2020-06-10 PROCEDURE — 97110 THERAPEUTIC EXERCISES: CPT

## 2020-06-10 NOTE — PROGRESS NOTES
Lillie Hudson  : 1945  [place]  [place] [address]  [phone]   [phone]      OUTPATIENT PHYSICAL THERAPY: Daily Treatment Note 6/10/2020  Visit Count:  2    ICD-10: Treatment Diagnosis: Pain in right shoulder (M25.511) and Stiffness of right shoulder, not elsewhere classified (M25.611)  TREATMENT PLAN:  Effective Dates: 2020 TO 9/3/2020 (90 days). Frequency/Duration: 1 time a week for 30 Day(s), 2 times a week for next 60 days  GOALS: (Goals have been discussed and agreed upon with patient.)  Short-Term Functional Goals: Time Frame: 4 weeks  # Goal Status   1 Pt will confirm compliance with home program to facilitate improvement in function. NEW     Discharge goals: Time frame: 12 weeks  # Goal Status   1 Pt will be able to elevate UE to at least 115° without symptoms in order to participate in ADLs such as reaching overhead and pulling shirt on. NEW   2 Pt will be able to reach over head to touch superior angle of opposite scapula without symptoms to be able to participate in ADLs such as reaching overhead and washing head. NEW   3 Pt will be able to reach across body to touch spine of opposite scapula without symptoms to be able to participate in ADLs such as reaching tasks. NEW   4 Pt will be able to reach behind back to touch lower back without symptoms to be able to participate in dressing and toileting activities. NEW   5 Pt will be able to reach behind themselves with arm elevated without symptoms to be able to participate in ADLs such as reaching tasks and grabbing seat belt. NEW       Pre-treatment Symptoms/Complaints:  Pt reports he has been doing his exercises and mostly his shoulder just feels tight.    Pain: Initial:  2-3/10   Post Session:  2/10   Medications Last Reviewed: 6/10/2020  Updated Objective Findings: Below measures from initial evaluation unless otherwise noted. 6/5/20  Pain at worst: 7/10  Observation: Incision healing well  ROM:      Left Right   Elevation (°) 141 PROM to 90 per visual estimation   Behind neck reach To TLJ Not tested   Behind back reach To opposite scapula  Not tested    Cross body reach To opposite scapula Not tested   R ER to neutral in adduction. UE ANDT: All within normal limits B  DASH: 45/55    TREATMENT:   THERAPEUTIC ACTIVITY: (see chart for minutes): Therapeutic activities per grid below to improve mobility, strength, balance and coordination. Required minimal visual, verbal and tactile cues to improve independence with functional mobility and activities. THERAPEUTIC EXERCISE: (see chart for minutes):  Exercises per grid below to improve mobility, strength, balance and coordination. Required minimal visual, verbal and tactile cues to promote proper body alignment and promote proper body mechanics. Progressed resistance, range, repetitions and complexity of movement as indicated. NEUROMUSCULAR RE-EDUCATION: (see chart for minutes):  Exercise/activities per grid below to improve balance, coordination, kinesthetic sense, posture, pain, and proprioception. Required minimal visual, verbal and tactile cues to promote motor control of right, upper extremity(s). MANUAL THERAPY: (see chart for minutes): Joint mobilization, Soft tissue mobilization, skin mobilization, and muscle energy techniques were utilized and necessary because of the patient's restricted joint motion, restricted motion of soft tissue and pain . MODALITIES: (see chart for minutes):      *  Cold Pack Therapy in order to provide analgesia. Vasopneumatic compression with cryotherapy to control pain and swelling s/p R rTSA.     Date: 6/5/20 (visit 1) 6/10/20 (visit 2)      Modalities: 15 min 15 min       Game ready: 15' to R shoulder on medium compression, coldest setting Repeat       Therapeutic Exercise: 15 min 30 min Shoulder isometrics: x3 holds R into flexion and abduction  Pulley into flexion: x5 with pt given cues to limit to 90 ° flexion, around height of hand to top of head. Pt educated on home program implementation and given printout. Reviewed his exercises from home health. Pendulum: x10 CW, CCW, A/P, and laterally  Elbow flexion: x5 with full extension achieved  Scapular elevation: x3 holds  Scapular retraction: x3 holds Pulley: 2x2' with pt instructed on working towards 120 ° flexion and given visual cue for this range (elbow to eye level based on L arm)   Scapular AROM: 2x10 for elevation, retraction, and protraction each  Isometric: 2x1' for shoulder flexion, extension, and abduction  Elbow AROM: x10  PROM into elevation and ER: x5'       Neuromuscular re-education                Manual Therapy:                Therapeutic Activities:                  Home program: 6/5/20: add pulley flexion and isometric (flexion and abduction) to home program    [employer] Portal  Treatment/Session Summary:    · Response to Treatment: Pt tolerated exercises well and confirms he has continued to work on them. · Communication/Consultation:  None today  · Equipment provided today:  6/5/20: doorway pulley  · Recommendations/Intent for next treatment session: Next visit will focus on R shoulder ROM, strength, and pain.     Total Treatment Billable Duration:  45 minutes  PT Patient Time In/Time Out  Time In: 8142  Time Out: [address] [address] Junior Ferrell, PT, DPT    Future Appointments   Date Time Provider Arpit Nails   6/17/2020 10:15 AM Wendi Cuba [facility]   6/24/2020 10:15 AM Wendi Cuba [facility]

## 2020-06-12 ENCOUNTER — APPOINTMENT (OUTPATIENT)
Dept: PHYSICAL THERAPY | Age: 75
End: 2020-06-12
Payer: MEDICARE

## 2020-06-15 ENCOUNTER — APPOINTMENT (OUTPATIENT)
Dept: PHYSICAL THERAPY | Age: 75
End: 2020-06-15
Payer: MEDICARE

## 2020-06-17 ENCOUNTER — HOSPITAL ENCOUNTER (OUTPATIENT)
Dept: PHYSICAL THERAPY | Age: 75
Discharge: HOME | End: 2020-06-17
Payer: MEDICARE

## 2020-06-17 PROCEDURE — 97110 THERAPEUTIC EXERCISES: CPT

## 2020-06-17 PROCEDURE — 97016 VASOPNEUMATIC DEVICE THERAPY: CPT

## 2020-06-17 NOTE — PROGRESS NOTES
Genet Red  : 1945  [place]  [place] [address]  [phone]   [phone]      OUTPATIENT PHYSICAL THERAPY: Daily Treatment Note 2020  Visit Count:  3    ICD-10: Treatment Diagnosis: Pain in right shoulder (M25.511) and Stiffness of right shoulder, not elsewhere classified (M25.611)  TREATMENT PLAN:  Effective Dates: 2020 TO 9/3/2020 (90 days). Frequency/Duration: 1 time a week for 30 Day(s), 2 times a week for next 60 days  GOALS: (Goals have been discussed and agreed upon with patient.)  Short-Term Functional Goals: Time Frame: 4 weeks  # Goal Status   1 Pt will confirm compliance with home program to facilitate improvement in function. NEW     Discharge goals: Time frame: 12 weeks  # Goal Status   1 Pt will be able to elevate UE to at least 115° without symptoms in order to participate in ADLs such as reaching overhead and pulling shirt on. NEW   2 Pt will be able to reach over head to touch superior angle of opposite scapula without symptoms to be able to participate in ADLs such as reaching overhead and washing head. NEW   3 Pt will be able to reach across body to touch spine of opposite scapula without symptoms to be able to participate in ADLs such as reaching tasks. NEW   4 Pt will be able to reach behind back to touch lower back without symptoms to be able to participate in dressing and toileting activities. NEW   5 Pt will be able to reach behind themselves with arm elevated without symptoms to be able to participate in ADLs such as reaching tasks and grabbing seat belt. NEW     Follow-up: 20    Pre-treatment Symptoms/Complaints:  Pt reports his shoulder pain has improved significantly. He reports he is able to sleep in bed now, supporting R shoulder with pillows. He says he can even get to 0/10 pain when positioned just right. He confirmed he has been working on home program.  Pain: Initial:  Pt reported no pain at rest, just some soreness Post Session:  No pain   Medications Last Reviewed: 6/17/2020  Updated Objective Findings: Below measures from initial evaluation unless otherwise noted. 6/5/20  Pain at worst: 7/10  Observation: Incision healing well  ROM:      Left Right   Elevation (°) 141 PROM to 90 per visual estimation   Behind neck reach To TLJ Not tested   Behind back reach To opposite scapula  Not tested    Cross body reach To opposite scapula Not tested   R ER to neutral in adduction. UE ANDT: All within normal limits B  DASH: 45/55    TREATMENT:   THERAPEUTIC ACTIVITY: (see chart for minutes): Therapeutic activities per grid below to improve mobility, strength, balance and coordination. Required minimal visual, verbal and tactile cues to improve independence with functional mobility and activities. THERAPEUTIC EXERCISE: (see chart for minutes):  Exercises per grid below to improve mobility, strength, balance and coordination. Required minimal visual, verbal and tactile cues to promote proper body alignment and promote proper body mechanics. Progressed resistance, range, repetitions and complexity of movement as indicated. NEUROMUSCULAR RE-EDUCATION: (see chart for minutes):  Exercise/activities per grid below to improve balance, coordination, kinesthetic sense, posture, pain, and proprioception. Required minimal visual, verbal and tactile cues to promote motor control of right, upper extremity(s). MANUAL THERAPY: (see chart for minutes): Joint mobilization, Soft tissue mobilization, skin mobilization, and muscle energy techniques were utilized and necessary because of the patient's restricted joint motion, restricted motion of soft tissue and pain . MODALITIES: (see chart for minutes):      *  Cold Pack Therapy in order to provide analgesia.  Vasopneumatic compression with cryotherapy to control pain and swelling s/p R rTSA. Date: 6/5/20 (visit 1) 6/10/20 (visit 2) 6/17/20 (visit 3)      Modalities: 15 min 15 min 15 min      Game ready: 15' to R shoulder on medium compression, coldest setting Repeat  Repeat      Therapeutic Exercise: 15 min 30 min 30 min      Shoulder isometrics: x3 holds R into flexion and abduction  Pulley into flexion: x5 with pt given cues to limit to 90 ° flexion, around height of hand to top of head. Pt educated on home program implementation and given printout. Reviewed his exercises from home health. Pendulum: x10 CW, CCW, A/P, and laterally  Elbow flexion: x5 with full extension achieved  Scapular elevation: x3 holds  Scapular retraction: x3 holds Pulley: 2x2' with pt instructed on working towards 120 ° flexion and given visual cue for this range (elbow to eye level based on L arm)   Scapular AROM: 2x10 for elevation, retraction, and protraction each  Isometric: 2x1' for shoulder flexion, extension, and abduction  Elbow AROM: x10  PROM into elevation and ER: x5'  Pulley: 2x2' with pt instructed to continue increasing flexion. He was able to achieve ~100 ° today  Scapular AROM: 2x10 for elevation, retraction, and protraction each  Isometric: 2x1' for shoulder flexion, extension, and abduction  Passive shoulder ER with cane: x10 R  PROM into elevation and ER: x5'      Neuromuscular re-education                Manual Therapy:                Therapeutic Activities:                  Home program: 6/5/20: add pulley flexion and isometric (flexion and abduction) to home program    [employer] Portal  Treatment/Session Summary:    · Response to Treatment: Pt improving shoulder ROM and pain. He was educated to continue working on using pillows to improve passive shoulder flexion and cane for ER.     · Communication/Consultation:  None today  · Equipment provided today:  6/5/20: doorway pulley  · Recommendations/Intent for next treatment session: Next visit will focus on R shoulder ROM, strength, and pain.     Total Treatment Billable Duration:  45 minutes  PT Patient Time In/Time Out  Time In: 1023  Time Out: [facility], PT, DPT    Future Appointments   Date Time Provider Arpit Nails   6/24/2020 10:15 AM Bo Villalba [facility]

## 2020-06-19 ENCOUNTER — APPOINTMENT (OUTPATIENT)
Dept: PHYSICAL THERAPY | Age: 75
End: 2020-06-19
Payer: MEDICARE

## 2020-06-22 ENCOUNTER — APPOINTMENT (OUTPATIENT)
Dept: PHYSICAL THERAPY | Age: 75
End: 2020-06-22
Payer: MEDICARE

## 2020-06-24 ENCOUNTER — HOSPITAL ENCOUNTER (OUTPATIENT)
Dept: PHYSICAL THERAPY | Age: 75
Discharge: HOME | End: 2020-06-24
Payer: MEDICARE

## 2020-06-24 PROCEDURE — 97110 THERAPEUTIC EXERCISES: CPT

## 2020-06-24 PROCEDURE — 97016 VASOPNEUMATIC DEVICE THERAPY: CPT

## 2020-06-24 NOTE — PROGRESS NOTES
Kanu Motley  : 1945  [place]  [place] [address] @ [address], [address] [place] [address]  [phone]   [phone]      OUTPATIENT PHYSICAL THERAPY: Daily Treatment Note 2020  Visit Count:  4    ICD-10: Treatment Diagnosis: Pain in right shoulder (M25.511) and Stiffness of right shoulder, not elsewhere classified (M25.611)  TREATMENT PLAN:  Effective Dates: 2020 TO 9/3/2020 (90 days). Frequency/Duration: 1 time a week for 30 Day(s), 2 times a week for next 60 days  GOALS: (Goals have been discussed and agreed upon with patient.)  Short-Term Functional Goals: Time Frame: 4 weeks  # Goal Status   1 Pt will confirm compliance with home program to facilitate improvement in function. NEW     Discharge goals: Time frame: 12 weeks  # Goal Status   1 Pt will be able to elevate UE to at least 115° without symptoms in order to participate in ADLs such as reaching overhead and pulling shirt on. NEW   2 Pt will be able to reach over head to touch superior angle of opposite scapula without symptoms to be able to participate in ADLs such as reaching overhead and washing head. NEW   3 Pt will be able to reach across body to touch spine of opposite scapula without symptoms to be able to participate in ADLs such as reaching tasks. NEW   4 Pt will be able to reach behind back to touch lower back without symptoms to be able to participate in dressing and toileting activities. NEW   5 Pt will be able to reach behind themselves with arm elevated without symptoms to be able to participate in ADLs such as reaching tasks and grabbing seat belt. NEW     Follow-up: 20    Pre-treatment Symptoms/Complaints:  Pt reports his shoulder has been doing relatively well. He has been doing his home program daily and improving flexion and ER PROM. He reports ADLs continue to get better.  He is to see MD next Tuesday for 6 week follow-up. Pain: Initial:  Pt reported no pain at rest Post Session:  No pain   Medications Last Reviewed: 6/24/2020  Updated Objective Findings: Below measures from initial evaluation unless otherwise noted. 6/5/20  Pain at worst: 7/10  Observation: Incision healing well  ROM:      Left Right   Elevation (°) 141 PROM to 90 per visual estimation   Behind neck reach To TLJ Not tested   Behind back reach To opposite scapula  Not tested    Cross body reach To opposite scapula Not tested   R ER to neutral in adduction. UE ANDT: All within normal limits B  DASH: 45/55    TREATMENT:   THERAPEUTIC ACTIVITY: (see chart for minutes): Therapeutic activities per grid below to improve mobility, strength, balance and coordination. Required minimal visual, verbal and tactile cues to improve independence with functional mobility and activities. THERAPEUTIC EXERCISE: (see chart for minutes):  Exercises per grid below to improve mobility, strength, balance and coordination. Required minimal visual, verbal and tactile cues to promote proper body alignment and promote proper body mechanics. Progressed resistance, range, repetitions and complexity of movement as indicated. NEUROMUSCULAR RE-EDUCATION: (see chart for minutes):  Exercise/activities per grid below to improve balance, coordination, kinesthetic sense, posture, pain, and proprioception. Required minimal visual, verbal and tactile cues to promote motor control of right, upper extremity(s). MANUAL THERAPY: (see chart for minutes): Joint mobilization, Soft tissue mobilization, skin mobilization, and muscle energy techniques were utilized and necessary because of the patient's restricted joint motion, restricted motion of soft tissue and pain . MODALITIES: (see chart for minutes):      *  Cold Pack Therapy in order to provide analgesia. Vasopneumatic compression with cryotherapy to control pain and swelling s/p R rTSA.     Date: 6/5/20 (visit 1) 6/10/20 (visit 2) 6/17/20 (visit 3)  6/24/20 (visit 4)     Modalities: 15 min 15 min 15 min 15 min     Game ready: 15' to R shoulder on medium compression, coldest setting Repeat  Repeat  Repeat     Therapeutic Exercise: 15 min 30 min 30 min 35 min     Shoulder isometrics: x3 holds R into flexion and abduction  Pulley into flexion: x5 with pt given cues to limit to 90 ° flexion, around height of hand to top of head. Pt educated on home program implementation and given printout. Reviewed his exercises from home health. Pendulum: x10 CW, CCW, A/P, and laterally  Elbow flexion: x5 with full extension achieved  Scapular elevation: x3 holds  Scapular retraction: x3 holds Pulley: 2x2' with pt instructed on working towards 120 ° flexion and given visual cue for this range (elbow to eye level based on L arm)   Scapular AROM: 2x10 for elevation, retraction, and protraction each  Isometric: 2x1' for shoulder flexion, extension, and abduction  Elbow AROM: x10  PROM into elevation and ER: x5'  Pulley: 2x2' with pt instructed to continue increasing flexion. He was able to achieve ~100 ° today  Scapular AROM: 2x10 for elevation, retraction, and protraction each  Isometric: 2x1' for shoulder flexion, extension, and abduction  Passive shoulder ER with cane: x10 R  PROM into elevation and ER: x5'  Pulley: x2'  Scapular AROM: 2x10 for elevation, retraction, and protraction each  Isometric: 2x1' for shoulder flexion, extension, and abduction  Passive shoulder ER with cane: 2x10 R  PROM into elevation and ER: x5'     Neuromuscular re-education                Manual Therapy:                Therapeutic Activities:                  Home program: 6/5/20: add pulley flexion and isometric (flexion and abduction) to home program    [employer] Portal  Treatment/Session Summary:    · Response to Treatment: Pt demonstrated improved passive flexion and ER ROM today and he continues to report shoulder mostly just feels tight now, not painful.  He has 6 week follow-up next week and will be advanced to begin restoring AROM. · Communication/Consultation:  None today  · Equipment provided today:  6/5/20: doorway pulley  · Recommendations/Intent for next treatment session: Next visit will focus on R shoulder ROM, strength, and pain.     Total Treatment Billable Duration:  50 minutes  PT Patient Time In/Time Out  Time In: 3122  Time Out: [address] Junior Ferrell, PT, DPT    Future Appointments   Date Time Provider Arpit Nails   7/1/2020 10:15 AM [facility]   7/6/2020 10:15 AM [facility]   7/8/2020 10:15 AM [facility]   7/13/2020 10:15 AM Wendi [facility]   7/15/2020 10:15 AM Wendi [facility]   7/20/2020 10:15 AM Wendi [facility]   7/22/2020 10:15 AM Wendi [facility]   7/27/2020 10:15 AM Wendi [facility]   7/29/2020 10:15 AM Wendi [facility]

## 2020-06-26 ENCOUNTER — APPOINTMENT (OUTPATIENT)
Dept: PHYSICAL THERAPY | Age: 75
End: 2020-06-26
Payer: MEDICARE

## 2020-07-01 ENCOUNTER — HOSPITAL ENCOUNTER (OUTPATIENT)
Dept: PHYSICAL THERAPY | Age: 75
Discharge: HOME | End: 2020-07-01
Payer: MEDICARE

## 2020-07-01 PROCEDURE — 97110 THERAPEUTIC EXERCISES: CPT

## 2020-07-01 PROCEDURE — 97016 VASOPNEUMATIC DEVICE THERAPY: CPT

## 2020-07-01 NOTE — PROGRESS NOTES
Pepe Riedel  : 1945  [place]  [place]: [place] [address]  @ [address], [address] [place] [address]  [phone]   [phone]      OUTPATIENT PHYSICAL THERAPY: Daily Treatment Note 2020  Visit Count:  5    ICD-10: Treatment Diagnosis: Pain in right shoulder (M25.511) and Stiffness of right shoulder, not elsewhere classified (M25.611)  TREATMENT PLAN:  Effective Dates: 2020 TO 9/3/2020 (90 days). Frequency/Duration: 1 time a week for 30 Day(s), 2 times a week for next 60 days  GOALS: (Goals have been discussed and agreed upon with patient.)  Short-Term Functional Goals: Time Frame: 4 weeks  # Goal Status   1 Pt will confirm compliance with home program to facilitate improvement in function. NEW     Discharge goals: Time frame: 12 weeks  # Goal Status   1 Pt will be able to elevate UE to at least 115° without symptoms in order to participate in ADLs such as reaching overhead and pulling shirt on. NEW   2 Pt will be able to reach over head to touch superior angle of opposite scapula without symptoms to be able to participate in ADLs such as reaching overhead and washing head. NEW   3 Pt will be able to reach across body to touch spine of opposite scapula without symptoms to be able to participate in ADLs such as reaching tasks. NEW   4 Pt will be able to reach behind back to touch lower back without symptoms to be able to participate in dressing and toileting activities. NEW   5 Pt will be able to reach behind themselves with arm elevated without symptoms to be able to participate in ADLs such as reaching tasks and grabbing seat belt. NEW     Follow-up: 20    Pre-treatment Symptoms/Complaints:  Pt saw MD yesterday and reports he was pleased with his progress so far. He is to advance towards AAROM and AROM while continuing to avoid reaching behind back.   Pain: Initial:  Pt reported no pain at rest Post Session:  No pain   Medications Last Reviewed: 7/1/2020  Updated Objective Findings: Below measures from initial evaluation unless otherwise noted. 6/5/20  Pain at worst: 7/10  Observation: Incision healing well  ROM:      Left Right   Elevation (°) 141 PROM to 90 per visual estimation   Behind neck reach To TLJ Not tested   Behind back reach To opposite scapula  Not tested    Cross body reach To opposite scapula Not tested   R ER to neutral in adduction. UE ANDT: All within normal limits B  DASH: 45/55    TREATMENT:   THERAPEUTIC ACTIVITY: (see chart for minutes): Therapeutic activities per grid below to improve mobility, strength, balance and coordination. Required minimal visual, verbal and tactile cues to improve independence with functional mobility and activities. THERAPEUTIC EXERCISE: (see chart for minutes):  Exercises per grid below to improve mobility, strength, balance and coordination. Required minimal visual, verbal and tactile cues to promote proper body alignment and promote proper body mechanics. Progressed resistance, range, repetitions and complexity of movement as indicated. NEUROMUSCULAR RE-EDUCATION: (see chart for minutes):  Exercise/activities per grid below to improve balance, coordination, kinesthetic sense, posture, pain, and proprioception. Required minimal visual, verbal and tactile cues to promote motor control of right, upper extremity(s). MANUAL THERAPY: (see chart for minutes): Joint mobilization, Soft tissue mobilization, skin mobilization, and muscle energy techniques were utilized and necessary because of the patient's restricted joint motion, restricted motion of soft tissue and pain . MODALITIES: (see chart for minutes):      *  Cold Pack Therapy in order to provide analgesia. Vasopneumatic compression with cryotherapy to control pain and swelling s/p R rTSA.     Date: 7/1/20 (visit 5)    Modalities: 10 min    Game ready: 10' to R shoulder, medium compression, coldest setting   Therapeutic Exercise: 35 min    UBE AAROM: 3'/3' L4  Pulley: 1'  Cane AAROM with active eccentric: x10. Pt reported only difficulty was tightness at top  Elevation AROM: x10 with no pain, just tightness at top  S/L ER: 2x10 R  S/L abduction: 2x10 R  Prone row and extension: 2x10 R, motion limited to extension to mid-line  Supine cane flexion: 2x10   PROM: x5' stretching into elevation, IR and ER in scapular plane   Neuromuscular re-education        Manual Therapy:        Therapeutic Activities:          Home program: 6/5/20: add pulley flexion and isometric (flexion and abduction) to home program    [facility] Portal  Treatment/Session Summary:    · Response to Treatment: Pt tolerated advancement to AAROM and AROM exercises well today. He demonstrates ability to elevate R UE overhead but continues to have tightness. · Communication/Consultation:  None today  · Equipment provided today:  6/5/20: doorway pulley  · Recommendations/Intent for next treatment session: Next visit will focus on R shoulder ROM, strength, and pain.     Total Treatment Billable Duration:  45 minutes  PT Patient Time In/Time Out  Time In: 1020  Time Out: 71 Eri Pelletier PT, DPT    Future Appointments   Date Time Provider Arpit Nails   [facility]2020 [facility]   7/8/2020  2:45 PM [facility]   7/13/2020 10:15 AM Jennifer [facility]   7/15/2020  3:30 PM Jennifer [facility]   7/20/2020 10:15 AM Jennifer [facility]   7/22/2020  3:30 PM Jennifer Palma [facility]   7/27/2020 10:15 AM Jennifer Palma [facility]   7/29/2020  3:30 PM Jennifer Palma [facility]

## 2020-07-06 ENCOUNTER — HOSPITAL ENCOUNTER (OUTPATIENT)
Dept: PHYSICAL THERAPY | Age: 75
Discharge: HOME | End: 2020-07-06
Payer: MEDICARE

## 2020-07-06 PROCEDURE — 97016 VASOPNEUMATIC DEVICE THERAPY: CPT

## 2020-07-06 PROCEDURE — 97110 THERAPEUTIC EXERCISES: CPT

## 2020-07-06 NOTE — PROGRESS NOTES
Cynthia Vivas  : 1945  [place]  [place] [employer] [address] @ [address]  [address] [address].  [phone]   [phone]      OUTPATIENT PHYSICAL THERAPY: Daily Treatment Note 2020  Visit Count:  6    ICD-10: Treatment Diagnosis: Pain in right shoulder (M25.511) and Stiffness of right shoulder, not elsewhere classified (M25.611)  TREATMENT PLAN:  Effective Dates: 2020 TO 9/3/2020 (90 days). Frequency/Duration: 1 time a week for 30 Day(s), 2 times a week for next 60 days  GOALS: (Goals have been discussed and agreed upon with patient.)  Short-Term Functional Goals: Time Frame: 4 weeks  # Goal Status   1 Pt will confirm compliance with home program to facilitate improvement in function. MET     Discharge goals: Time frame: 12 weeks  # Goal Status   1 Pt will be able to elevate UE to at least 115° without symptoms in order to participate in ADLs such as reaching overhead and pulling shirt on. NEW   2 Pt will be able to reach over head to touch superior angle of opposite scapula without symptoms to be able to participate in ADLs such as reaching overhead and washing head. NEW   3 Pt will be able to reach across body to touch spine of opposite scapula without symptoms to be able to participate in ADLs such as reaching tasks. NEW   4 Pt will be able to reach behind back to touch lower back without symptoms to be able to participate in dressing and toileting activities. NEW   5 Pt will be able to reach behind themselves with arm elevated without symptoms to be able to participate in ADLs such as reaching tasks and grabbing seat belt. NEW     Follow-up: 20    Pre-treatment Symptoms/Complaints:  Pt reports his shoulder has continued to do well and he has been working on exercises at home. He reports main symptom is just tightness with shoulder AROM.    Pain: Initial:  Pt reported no pain, just tightness  Post Session:  No pain   Medications Last Reviewed: 7/6/2020  Updated Objective Findings: Below measures from initial evaluation unless otherwise noted. 6/5/20  Pain at worst: 7/10  Observation: Incision healing well  ROM:      Left Right   Elevation (°) 141 PROM to 90 per visual estimation   Behind neck reach To TLJ Not tested   Behind back reach To opposite scapula  Not tested    Cross body reach To opposite scapula Not tested   R ER to neutral in adduction. UE ANDT: All within normal limits B  DASH: 45/55    TREATMENT:   THERAPEUTIC ACTIVITY: (see chart for minutes): Therapeutic activities per grid below to improve mobility, strength, balance and coordination. Required minimal visual, verbal and tactile cues to improve independence with functional mobility and activities. THERAPEUTIC EXERCISE: (see chart for minutes):  Exercises per grid below to improve mobility, strength, balance and coordination. Required minimal visual, verbal and tactile cues to promote proper body alignment and promote proper body mechanics. Progressed resistance, range, repetitions and complexity of movement as indicated. NEUROMUSCULAR RE-EDUCATION: (see chart for minutes):  Exercise/activities per grid below to improve balance, coordination, kinesthetic sense, posture, pain, and proprioception. Required minimal visual, verbal and tactile cues to promote motor control of right, upper extremity(s). MANUAL THERAPY: (see chart for minutes): Joint mobilization, Soft tissue mobilization, skin mobilization, and muscle energy techniques were utilized and necessary because of the patient's restricted joint motion, restricted motion of soft tissue and pain . MODALITIES: (see chart for minutes):      *  Cold Pack Therapy in order to provide analgesia. Vasopneumatic compression with cryotherapy to control pain and swelling s/p R rTSA.     Date: 7/1/20 (visit 5)  7/6/20 (visit 6)        Modalities: 10 min 15 min        Game ready: 10' to R shoulder, medium compression, coldest setting Game ready: 15' to R shoulder, medium compression, coldest setting       Therapeutic Exercise: 35 min 30 min        UBE AAROM: 3'/3' L4  Pulley: 1'  Cane AAROM with active eccentric: x10. Pt reported only difficulty was tightness at top  Elevation AROM: x10 with no pain, just tightness at top  S/L ER: 2x10 R  S/L abduction: 2x10 R  Prone row and extension: 2x10 R, motion limited to extension to mid-line  Supine cane flexion: 2x10   PROM: x5' stretching into elevation, IR and ER in scapular plane UBE AAROM: 3'/3' L4  Pulley: 2'  Cane AAROM with active eccentric: 2x10. Elevation AROM: 2x10   S/L ER: 2x10 R  S/L abduction: 2x10 R  Prone row and extension: 2x10 R, motion limited to extension to mid-line  Prone Y: 2x10 in limited ROM due to tightness   Cane ER: 2x10 R       Neuromuscular re-education                  Manual Therapy:                  Therapeutic Activities:                    Home program: 6/5/20: add pulley flexion and isometric (flexion and abduction) to home program    [facility]  Treatment/Session Summary:    · Response to Treatment: Pt progressing well and demonstrates improved active elevation. · Communication/Consultation:  None today  · Equipment provided today:  6/5/20: doorway pulley  · Recommendations/Intent for next treatment session: Next visit will focus on R shoulder ROM, strength, and pain.     Total Treatment Billable Duration:  45 minutes  PT Patient Time In/Time Out  Time In: 2287  Time Out: Choco 245, PT, DPT    Future Appointments   Date Time Provider Arpit Nails   7/8/2020  2:45 PM Yenny Part [facility]   7/13/2020 10:15 AM Yenny Part [facility]   7/15/2020  3[facility]30 [facility]   7/20/2020 10:15 AM [facility]   7/22/2020  3:30 PM [facility]   7/27/2020 10:15 AM [facility]   7/29/2020  3:30 PM Yenny Part [facility]

## 2020-07-08 ENCOUNTER — HOSPITAL ENCOUNTER (OUTPATIENT)
Dept: PHYSICAL THERAPY | Age: 75
Discharge: HOME | End: 2020-07-08
Payer: MEDICARE

## 2020-07-08 PROCEDURE — 97016 VASOPNEUMATIC DEVICE THERAPY: CPT

## 2020-07-08 PROCEDURE — 97110 THERAPEUTIC EXERCISES: CPT

## 2020-07-08 NOTE — PROGRESS NOTES
Hunter Child  : 1945  [place]  [place] [address] @ [address]  [phone]   [phone]      OUTPATIENT PHYSICAL THERAPY: Daily Treatment and progress Note 2020  Visit Count:  7    ICD-10: Treatment Diagnosis: Pain in right shoulder (M25.511) and Stiffness of right shoulder, not elsewhere classified (M25.611)  TREATMENT PLAN:  Effective Dates: 2020 TO 9/3/2020 (90 days). Frequency/Duration: 1 time a week for 30 Day(s), 2 times a week for next 60 days  GOALS: (Goals have been discussed and agreed upon with patient.)  Short-Term Functional Goals: Time Frame: 4 weeks  # Goal Status   1 Pt will confirm compliance with home program to facilitate improvement in function. MET     Discharge goals: Time frame: 12 weeks  # Goal Status   1 Pt will be able to elevate UE to at least 115° without symptoms in order to participate in ADLs such as reaching overhead and pulling shirt on.  20: R elevation AAROM to 114 °, R elevation AROM to 108 °  PROGRESSING   2 Pt will be able to reach over head to touch superior angle of opposite scapula without symptoms to be able to participate in ADLs such as reaching overhead and washing head. NEW   3 Pt will be able to reach across body to touch spine of opposite scapula without symptoms to be able to participate in ADLs such as reaching tasks. NEW   4 Pt will be able to reach behind back to touch lower back without symptoms to be able to participate in dressing and toileting activities. NEW   5 Pt will be able to reach behind themselves with arm elevated without symptoms to be able to participate in ADLs such as reaching tasks and grabbing seat belt. NEW     Follow-up: 20    Pre-treatment Symptoms/Complaints:  Pt reports his shoulder feels tighter than normal today but not painful.  He denies any known reason including increased activity level. He confirms he has been doing exercises and following precautions. Pain: Initial:  Pt reported no pain, just tightness  Post Session:  No pain   Medications Last Reviewed: 7/8/2020  Updated Objective Findings: Below measures from initial evaluation unless otherwise noted. 6/5/20  Pain at worst: 7/10  Observation: Incision healing well  ROM:      Left Right   Elevation (°) 141 PROM to 90 per visual estimation   Behind neck reach To TLJ Not tested   Behind back reach To opposite scapula  Not tested    Cross body reach To opposite scapula Not tested   R ER to neutral in adduction. UE ANDT: All within normal limits B  DASH: 45/55    7/8/20:   R elevation AAROM to 114 °, R elevation AROM to 108 °     TREATMENT:   THERAPEUTIC ACTIVITY: (see chart for minutes): Therapeutic activities per grid below to improve mobility, strength, balance and coordination. Required minimal visual, verbal and tactile cues to improve independence with functional mobility and activities. THERAPEUTIC EXERCISE: (see chart for minutes):  Exercises per grid below to improve mobility, strength, balance and coordination. Required minimal visual, verbal and tactile cues to promote proper body alignment and promote proper body mechanics. Progressed resistance, range, repetitions and complexity of movement as indicated. NEUROMUSCULAR RE-EDUCATION: (see chart for minutes):  Exercise/activities per grid below to improve balance, coordination, kinesthetic sense, posture, pain, and proprioception. Required minimal visual, verbal and tactile cues to promote motor control of right, upper extremity(s). MANUAL THERAPY: (see chart for minutes): Joint mobilization, Soft tissue mobilization, skin mobilization, and muscle energy techniques were utilized and necessary because of the patient's restricted joint motion, restricted motion of soft tissue and pain .    MODALITIES: (see chart for minutes):      *  Cold Pack Therapy in order to provide analgesia. Vasopneumatic compression with cryotherapy to control pain and swelling s/p R rTSA. Date: 7/1/20 (visit 5)  7/6/20 (visit 6)  7/8/20 (visit 7)       Modalities: 10 min 15 min 15 min       Game ready: 10' to R shoulder, medium compression, coldest setting Game ready: 15' to R shoulder, medium compression, coldest setting Repeat       Therapeutic Exercise: 35 min 30 min 33 min       UBE AAROM: 3'/3' L4  Pulley: 1'  Cane AAROM with active eccentric: x10. Pt reported only difficulty was tightness at top  Elevation AROM: x10 with no pain, just tightness at top  S/L ER: 2x10 R  S/L abduction: 2x10 R  Prone row and extension: 2x10 R, motion limited to extension to mid-line  Supine cane flexion: 2x10   PROM: x5' stretching into elevation, IR and ER in scapular plane UBE AAROM: 3'/3' L4  Pulley: 2'  Cane AAROM with active eccentric: 2x10. Elevation AROM: 2x10   S/L ER: 2x10 R  S/L abduction: 2x10 R  Prone row and extension: 2x10 R, motion limited to extension to mid-line  Prone Y: 2x10 in limited ROM due to tightness   Cane ER: 2x10 R UBE AAROM: 3'/3' L4  Pulley: 2'  Cane AAROM with active eccentric: 2x10. Cane ER: 2x10 R  Elevation AROM: x10   S/L ER: 2x10 R  S/L abduction: 2x10 R  PROM: 5' into ER and IR in scaption, shoulder flexion        Neuromuscular re-education                  Manual Therapy:                  Therapeutic Activities:                    Home program: 6/5/20: add pulley flexion and isometric (flexion and abduction) to home program    [facility] Portal  Treatment/Session Summary:    · Response to Treatment: Pt continues to progress well. He demonstrates significant improvement in shoulder ROM and strength. He still has limitations with ROM, strength, and activity tolerance and reports he is continuing to follow his precautions. He will continue to benefit from skilled physical therapy to address these limitations.    · Communication/Consultation:  None today  · Equipment provided today:  6/5/20: doorway pulley  · Recommendations/Intent for next treatment session: Next visit will focus on R shoulder ROM, strength, and pain.     Total Treatment Billable Duration:  48 minutes  PT Patient Time In/Time Out  Time In: 5601  Time Out: [address], PT, DPT    Future Appointments   Date Time Provider Arpit Nails   7/13/2020 10:15 AM Driftwood Hausen [facility]   7/15/2020  3:30 PM Desean Hausen [facility]   7/20/2020 10:15 AM Desean Hausen [facility]   7/22/2020  3:30 PM Desean Hausen [facility]   7/27/2020 10:15 AM Driftwood Hausen [facility]   7/29/2020  3:30 PM Desean Hausen [facility]

## 2020-07-13 ENCOUNTER — HOSPITAL ENCOUNTER (OUTPATIENT)
Dept: PHYSICAL THERAPY | Age: 75
Discharge: HOME | End: 2020-07-13
Payer: MEDICARE

## 2020-07-13 PROCEDURE — 97110 THERAPEUTIC EXERCISES: CPT

## 2020-07-13 PROCEDURE — 97016 VASOPNEUMATIC DEVICE THERAPY: CPT

## 2020-07-13 NOTE — PROGRESS NOTES
Marily Starkey  : 1945  [place]  [place]: [place] - [facility] [address] @ [address]  [phone]   [id number]      OUTPATIENT PHYSICAL THERAPY: Daily Treatment Note 2020  Visit Count:  8    ICD-10: Treatment Diagnosis: Pain in right shoulder (M25.511) and Stiffness of right shoulder, not elsewhere classified (M25.611)  TREATMENT PLAN:  Effective Dates: 2020 TO 9/3/2020 (90 days). Frequency/Duration: 1 time a week for 30 Day(s), 2 times a week for next 60 days  GOALS: (Goals have been discussed and agreed upon with patient.)  Short-Term Functional Goals: Time Frame: 4 weeks  # Goal Status   1 Pt will confirm compliance with home program to facilitate improvement in function. MET     Discharge goals: Time frame: 12 weeks  # Goal Status   1 Pt will be able to elevate UE to at least 115° without symptoms in order to participate in ADLs such as reaching overhead and pulling shirt on.  20: R elevation AAROM to 114 °, R elevation AROM to 108 °  PROGRESSING   2 Pt will be able to reach over head to touch superior angle of opposite scapula without symptoms to be able to participate in ADLs such as reaching overhead and washing head. NEW   3 Pt will be able to reach across body to touch spine of opposite scapula without symptoms to be able to participate in ADLs such as reaching tasks. NEW   4 Pt will be able to reach behind back to touch lower back without symptoms to be able to participate in dressing and toileting activities. NEW   5 Pt will be able to reach behind themselves with arm elevated without symptoms to be able to participate in ADLs such as reaching tasks and grabbing seat belt. NEW     Follow-up: 20    Pre-treatment Symptoms/Complaints:  Pt reports his shoulder just generally feels tight, but no pain.  He confirms he continues to work on home program.  Pain: Initial: Pt reported no pain, just tightness  Post Session:  No pain   Medications Last Reviewed: 7/13/2020  Updated Objective Findings: Below measures from initial evaluation unless otherwise noted. 6/5/20  Pain at worst: 7/10  Observation: Incision healing well  ROM:      Left Right   Elevation (°) 141 PROM to 90 per visual estimation   Behind neck reach To TLJ Not tested   Behind back reach To opposite scapula  Not tested    Cross body reach To opposite scapula Not tested   R ER to neutral in adduction. UE ANDT: All within normal limits B  DASH: 45/55    7/8/20:   R elevation AAROM to 114 °, R elevation AROM to 108 °     TREATMENT:   THERAPEUTIC ACTIVITY: (see chart for minutes): Therapeutic activities per grid below to improve mobility, strength, balance and coordination. Required minimal visual, verbal and tactile cues to improve independence with functional mobility and activities. THERAPEUTIC EXERCISE: (see chart for minutes):  Exercises per grid below to improve mobility, strength, balance and coordination. Required minimal visual, verbal and tactile cues to promote proper body alignment and promote proper body mechanics. Progressed resistance, range, repetitions and complexity of movement as indicated. NEUROMUSCULAR RE-EDUCATION: (see chart for minutes):  Exercise/activities per grid below to improve balance, coordination, kinesthetic sense, posture, pain, and proprioception. Required minimal visual, verbal and tactile cues to promote motor control of right, upper extremity(s). MANUAL THERAPY: (see chart for minutes): Joint mobilization, Soft tissue mobilization, skin mobilization, and muscle energy techniques were utilized and necessary because of the patient's restricted joint motion, restricted motion of soft tissue and pain . MODALITIES: (see chart for minutes):      *  Cold Pack Therapy in order to provide analgesia.  Vasopneumatic compression with cryotherapy to control pain and swelling s/p R rTSA.    Date: 7/1/20 (visit 5)  7/6/20 (visit 6)  7/8/20 (visit 7) PN 7/13/20 (visit 8)      Modalities: 10 min 15 min 15 min 15 min      Game ready: 10' to R shoulder, medium compression, coldest setting Game ready: 15' to R shoulder, medium compression, coldest setting Repeat  Repeat     Therapeutic Exercise: 35 min 30 min 33 min 28 min      UBE AAROM: 3'/3' L4  Pulley: 1'  Cane AAROM with active eccentric: x10. Pt reported only difficulty was tightness at top  Elevation AROM: x10 with no pain, just tightness at top  S/L ER: 2x10 R  S/L abduction: 2x10 R  Prone row and extension: 2x10 R, motion limited to extension to mid-line  Supine cane flexion: 2x10   PROM: x5' stretching into elevation, IR and ER in scapular plane UBE AAROM: 3'/3' L4  Pulley: 2'  Cane AAROM with active eccentric: 2x10. Elevation AROM: 2x10   S/L ER: 2x10 R  S/L abduction: 2x10 R  Prone row and extension: 2x10 R, motion limited to extension to mid-line  Prone Y: 2x10 in limited ROM due to tightness   Cane ER: 2x10 R UBE AAROM: 3'/3' L4  Pulley: 2'  Cane AAROM with active eccentric: 2x10. Cane ER: 2x10 R  Elevation AROM: x10   S/L ER: 2x10 R  S/L abduction: 2x10 R  PROM: 5' into ER and IR in scaption, shoulder flexion   UBE AAROM: 3'/3' L4  Cane AAROM with active eccentric: x10  Cane ER: x10 R  Elevation AROM: x10   Wall slide: 2x10 R  S/L ER: 2x10 R  S/L abduction: 2x10 R  Prone row, I, and Y: 2x10 each      Neuromuscular re-education                  Manual Therapy:                  Therapeutic Activities:                    Home program: 6/5/20: add pulley flexion and isometric (flexion and abduction) to home program    [facility] Portal  Treatment/Session Summary:    · Response to Treatment: Pt tolerated all exercises well. He continues to work on ROM and stretching.    · Communication/Consultation:  None today  · Equipment provided today:  6/5/20: doorway pulley  · Recommendations/Intent for next treatment session: Next visit will focus on R shoulder ROM, strength, and pain.     Total Treatment Billable Duration:  43 minutes  PT Patient Time In/Time Out  Time In: 1015  Time Out: [address] Geoffrey Del Rio, PT, DPT    Future Appointments   Date Time Provider Arpit Nails   7/15/2020  3:30 PM Pavel Joshi [facility]   7/20/2020 10:15 AM Pavel HODGSONOFGROVER [facility]   7/22/2020  3:30 PM Pavel HODGSONOFR [facility]   7/27/2020 10:15 AM Pavel HODGSONOFR [facility]   7/29/2020  3:30 PM Pavel HODGSONOFR [facility]

## 2020-07-15 ENCOUNTER — HOSPITAL ENCOUNTER (OUTPATIENT)
Dept: PHYSICAL THERAPY | Age: 75
Discharge: HOME | End: 2020-07-15
Payer: MEDICARE

## 2020-07-15 PROCEDURE — 97110 THERAPEUTIC EXERCISES: CPT

## 2020-07-15 PROCEDURE — 97016 VASOPNEUMATIC DEVICE THERAPY: CPT

## 2020-07-15 NOTE — PROGRESS NOTES
Spero Essex  : 1945  [place]  [place] [employer] - [facility] [address]  [phone]   [phone]      OUTPATIENT PHYSICAL THERAPY: Daily Treatment Note 7/15/2020  Visit Count:  9    ICD-10: Treatment Diagnosis: Pain in right shoulder (M25.511) and Stiffness of right shoulder, not elsewhere classified (M25.611)  TREATMENT PLAN:  Effective Dates: 2020 TO 9/3/2020 (90 days). Frequency/Duration: 1 time a week for 30 Day(s), 2 times a week for next 60 days  GOALS: (Goals have been discussed and agreed upon with patient.)  Short-Term Functional Goals: Time Frame: 4 weeks  # Goal Status   1 Pt will confirm compliance with home program to facilitate improvement in function. MET     Discharge goals: Time frame: 12 weeks  # Goal Status   1 Pt will be able to elevate UE to at least 115° without symptoms in order to participate in ADLs such as reaching overhead and pulling shirt on.  20: R elevation AAROM to 114 °, R elevation AROM to 108 °  PROGRESSING   2 Pt will be able to reach over head to touch superior angle of opposite scapula without symptoms to be able to participate in ADLs such as reaching overhead and washing head. NEW   3 Pt will be able to reach across body to touch spine of opposite scapula without symptoms to be able to participate in ADLs such as reaching tasks. NEW   4 Pt will be able to reach behind back to touch lower back without symptoms to be able to participate in dressing and toileting activities. NEW   5 Pt will be able to reach behind themselves with arm elevated without symptoms to be able to participate in ADLs such as reaching tasks and grabbing seat belt. NEW     Follow-up: 20    Pre-treatment Symptoms/Complaints:  Pt reports his shoulder just generally feels tight, but no pain.  He confirms he continues to work on home program.  Pain: Initial: Pt reported no pain, just tightness  Post Session:  No pain   Medications Last Reviewed: 7/15/2020  Updated Objective Findings: Below measures from initial evaluation unless otherwise noted. 6/5/20  Pain at worst: 7/10  Observation: Incision healing well  ROM:      Left Right   Elevation (°) 141 PROM to 90 per visual estimation   Behind neck reach To TLJ Not tested   Behind back reach To opposite scapula  Not tested    Cross body reach To opposite scapula Not tested   R ER to neutral in adduction. UE ANDT: All within normal limits B  DASH: 45/55    7/8/20:   R elevation AAROM to 114 °, R elevation AROM to 108 °     TREATMENT:   THERAPEUTIC ACTIVITY: (see chart for minutes): Therapeutic activities per grid below to improve mobility, strength, balance and coordination. Required minimal visual, verbal and tactile cues to improve independence with functional mobility and activities. THERAPEUTIC EXERCISE: (see chart for minutes):  Exercises per grid below to improve mobility, strength, balance and coordination. Required minimal visual, verbal and tactile cues to promote proper body alignment and promote proper body mechanics. Progressed resistance, range, repetitions and complexity of movement as indicated. NEUROMUSCULAR RE-EDUCATION: (see chart for minutes):  Exercise/activities per grid below to improve balance, coordination, kinesthetic sense, posture, pain, and proprioception. Required minimal visual, verbal and tactile cues to promote motor control of right, upper extremity(s). MANUAL THERAPY: (see chart for minutes): Joint mobilization, Soft tissue mobilization, skin mobilization, and muscle energy techniques were utilized and necessary because of the patient's restricted joint motion, restricted motion of soft tissue and pain . MODALITIES: (see chart for minutes):      *  Cold Pack Therapy in order to provide analgesia.  Vasopneumatic compression with cryotherapy to control pain and swelling s/p R rTSA.    Date: 7/1/20 (visit 5)  7/6/20 (visit 6)  7/8/20 (visit 7) PN 7/13/20 (visit 8)  7/15/20 (visit 9)     Modalities: 10 min 15 min 15 min 15 min 15 min     Game ready: 10' to R shoulder, medium compression, coldest setting Game ready: 15' to R shoulder, medium compression, coldest setting Repeat  Repeat Repeat     Therapeutic Exercise: 35 min 30 min 33 min 28 min 35 min     UBE AAROM: 3'/3' L4  Pulley: 1'  Cane AAROM with active eccentric: x10. Pt reported only difficulty was tightness at top  Elevation AROM: x10 with no pain, just tightness at top  S/L ER: 2x10 R  S/L abduction: 2x10 R  Prone row and extension: 2x10 R, motion limited to extension to mid-line  Supine cane flexion: 2x10   PROM: x5' stretching into elevation, IR and ER in scapular plane UBE AAROM: 3'/3' L4  Pulley: 2'  Cane AAROM with active eccentric: 2x10. Elevation AROM: 2x10   S/L ER: 2x10 R  S/L abduction: 2x10 R  Prone row and extension: 2x10 R, motion limited to extension to mid-line  Prone Y: 2x10 in limited ROM due to tightness   Cane ER: 2x10 R UBE AAROM: 3'/3' L4  Pulley: 2'  Cane AAROM with active eccentric: 2x10.    Cane ER: 2x10 R  Elevation AROM: x10   S/L ER: 2x10 R  S/L abduction: 2x10 R  PROM: 5' into ER and IR in scaption, shoulder flexion   UBE AAROM: 3'/3' L4  Cane AAROM with active eccentric: x10  Cane ER: x10 R  Elevation AROM: x10   Wall slide: 2x10 R  S/L ER: 2x10 R  S/L abduction: 2x10 R  Prone row, I, and Y: 2x10 each  UBE AAROM: 3'/3' L4  Scaption: 2x10x1# B   Wall slide: 2x10 R  S/L ER: 2x10x1# R  S/L abduction: 2x10x1# R  Prone row, I, and Y: 2x10x1# R   PROM: 5' into ER, IR, and flexion    Neuromuscular re-education                  Manual Therapy:                  Therapeutic Activities:                    Home program: 6/5/20: add pulley flexion and isometric (flexion and abduction) to home program    [facility] Portal  Treatment/Session Summary:    · Response to Treatment: Pt progressing well and tolerated addition of 1# resistance to AROM exercises well. He continues to have tightness into ER and flexion. · Communication/Consultation:  None today  · Equipment provided today:  6/5/20: doorway pulley  · Recommendations/Intent for next treatment session: Next visit will focus on R shoulder ROM, strength, and pain.     Total Treatment Billable Duration:  50 minutes  PT Patient Time In/Time Out  Time In:   Time Out: [address], PT, DPT    Future Appointments   Date Time Provider Arpit Nails   7/20/2020 10:15 AM Vevelyn Shaggy [facility]   7/22/2020  3:30 [facility]   7/27[facility]2020 10:15 [facility]   7/29/2020  3:30 PM Vevelyn Shaggy [facility]   8/3/2020 10:15 AM [facility]gy [facility]   8/5/2020 11:00 AM [facility]   8/10/2020 10:15 AM [facility]   8/12/2020 11:00 AM [facility]   8/17/2020 10:15 AM [facility]   8/19/2020 11:00 AM [facility]   8/24/2020 10:15 AM Vevelyn Shaggy [facility]   8/26/2020 11:00 AM Vevelyn Shaggy [facility]   8/31/2020 10:15 AM [facility]

## 2020-07-20 ENCOUNTER — HOSPITAL ENCOUNTER (OUTPATIENT)
Dept: PHYSICAL THERAPY | Age: 75
Discharge: HOME | End: 2020-07-20
Payer: MEDICARE

## 2020-07-20 PROCEDURE — 97110 THERAPEUTIC EXERCISES: CPT

## 2020-07-20 PROCEDURE — 97016 VASOPNEUMATIC DEVICE THERAPY: CPT

## 2020-07-20 NOTE — PROGRESS NOTES
Ran Goff  : 1945  [place]  [place] [employer] [address]  [address]  [phone]   [phone]      OUTPATIENT PHYSICAL THERAPY: Daily Treatment and progress Note 2020  Visit Count:  10    ICD-10: Treatment Diagnosis: Pain in right shoulder (M25.511) and Stiffness of right shoulder, not elsewhere classified (M25.611)  TREATMENT PLAN:  Effective Dates: 2020 TO 9/3/2020 (90 days). Frequency/Duration: 1 time a week for 30 Day(s), 2 times a week for next 60 days  GOALS: (Goals have been discussed and agreed upon with patient.)  Short-Term Functional Goals: Time Frame: 4 weeks  # Goal Status   1 Pt will confirm compliance with home program to facilitate improvement in function. MET     Discharge goals: Time frame: 12 weeks  # Goal Status   1 Pt will be able to elevate UE to at least 115° without symptoms in order to participate in ADLs such as reaching overhead and pulling shirt on.  20: R elevation AAROM to 114 °, R elevation AROM to 108 °  PROGRESSING   2 Pt will be able to reach over head to touch superior angle of opposite scapula without symptoms to be able to participate in ADLs such as reaching overhead and washing head. ONGOING   3 Pt will be able to reach across body to touch spine of opposite scapula without symptoms to be able to participate in ADLs such as reaching tasks. ONGOING   4 Pt will be able to reach behind back to touch lower back without symptoms to be able to participate in dressing and toileting activities. ONGOING   5 Pt will be able to reach behind themselves with arm elevated without symptoms to be able to participate in ADLs such as reaching tasks and grabbing seat belt. ONGOING     Follow-up: 20    Pre-treatment Symptoms/Complaints:  Pt reports his shoulder continues to feel more mobile and useful with ADLs.  He reports he has continued to avoid reaching behind back and arm is still weak. Pain: Initial:  Pt reported no pain, just tightness  Post Session:  No pain   Medications Last Reviewed: 7/20/2020  Updated Objective Findings: Below measures from initial evaluation unless otherwise noted. 6/5/20  Pain at worst: 7/10  Observation: Incision healing well  ROM:      Left Right   Elevation (°) 141 PROM to 90 per visual estimation   Behind neck reach To TLJ Not tested   Behind back reach To opposite scapula  Not tested    Cross body reach To opposite scapula Not tested   R ER to neutral in adduction. UE ANDT: All within normal limits B  DASH: 45/55    7/8/20:   R elevation AAROM to 114 °, R elevation AROM to 108 °     TREATMENT:   THERAPEUTIC ACTIVITY: (see chart for minutes): Therapeutic activities per grid below to improve mobility, strength, balance and coordination. Required minimal visual, verbal and tactile cues to improve independence with functional mobility and activities. THERAPEUTIC EXERCISE: (see chart for minutes):  Exercises per grid below to improve mobility, strength, balance and coordination. Required minimal visual, verbal and tactile cues to promote proper body alignment and promote proper body mechanics. Progressed resistance, range, repetitions and complexity of movement as indicated. NEUROMUSCULAR RE-EDUCATION: (see chart for minutes):  Exercise/activities per grid below to improve balance, coordination, kinesthetic sense, posture, pain, and proprioception. Required minimal visual, verbal and tactile cues to promote motor control of right, upper extremity(s). MANUAL THERAPY: (see chart for minutes): Joint mobilization, Soft tissue mobilization, skin mobilization, and muscle energy techniques were utilized and necessary because of the patient's restricted joint motion, restricted motion of soft tissue and pain . MODALITIES: (see chart for minutes):      *  Cold Pack Therapy in order to provide analgesia. Vasopneumatic compression with cryotherapy to control pain and swelling s/p R rTSA. Date: 7/1/20 (visit 5)  7/6/20 (visit 6)  7/8/20 (visit 7) PN 7/13/20 (visit 8)  7/15/20 (visit 9)  7/20/20 (visit 10) PN   Modalities: 10 min 15 min 15 min 15 min 15 min 15 min    Game ready: 10' to R shoulder, medium compression, coldest setting Game ready: 15' to R shoulder, medium compression, coldest setting Repeat  Repeat Repeat  Repeat    Therapeutic Exercise: 35 min 30 min 33 min 28 min 35 min 30 min    UBE AAROM: 3'/3' L4  Pulley: 1'  Cane AAROM with active eccentric: x10. Pt reported only difficulty was tightness at top  Elevation AROM: x10 with no pain, just tightness at top  S/L ER: 2x10 R  S/L abduction: 2x10 R  Prone row and extension: 2x10 R, motion limited to extension to mid-line  Supine cane flexion: 2x10   PROM: x5' stretching into elevation, IR and ER in scapular plane UBE AAROM: 3'/3' L4  Pulley: 2'  Cane AAROM with active eccentric: 2x10. Elevation AROM: 2x10   S/L ER: 2x10 R  S/L abduction: 2x10 R  Prone row and extension: 2x10 R, motion limited to extension to mid-line  Prone Y: 2x10 in limited ROM due to tightness   Cane ER: 2x10 R UBE AAROM: 3'/3' L4  Pulley: 2'  Cane AAROM with active eccentric: 2x10.    Cane ER: 2x10 R  Elevation AROM: x10   S/L ER: 2x10 R  S/L abduction: 2x10 R  PROM: 5' into ER and IR in scaption, shoulder flexion   UBE AAROM: 3'/3' L4  Cane AAROM with active eccentric: x10  Cane ER: x10 R  Elevation AROM: x10   Wall slide: 2x10 R  S/L ER: 2x10 R  S/L abduction: 2x10 R  Prone row, I, and Y: 2x10 each  UBE AAROM: 3'/3' L4  Scaption: 2x10x1# B   Wall slide: 2x10 R  S/L ER: 2x10x1# R  S/L abduction: 2x10x1# R  Prone row, I, and Y: 2x10x1# R   PROM: 5' into ER, IR, and flexion UBE AAROM: 3'/3' L4  Scaption: 2x10x2# B   Wall slide: 2x10 R  S/L ER: x10x2#, 10x3# R  S/L abduction: 10x2# R, 10x3# R  Prone row, I: 2x10x3# R   Prone Y: 2x10x1# R  PROM: 5' into ER, IR, and flexion Neuromuscular re-education                  Manual Therapy:                  Therapeutic Activities:                    Home program: 6/5/20: add pulley flexion and isometric (flexion and abduction) to home program    [facility] Portal  Treatment/Session Summary:    · Response to Treatment: Pt tolerated increase in resistance well. He reported it felt challenging but not painful. He demonstrates improvement in ROM, strength, and functional use of R shoulder. He continues to be limited with elevation, IR, and ER and strength in all directions with shoulder. He will continue to benefit from skilled physical therapy to address these limitations. · Communication/Consultation:  None today  · Equipment provided today:  6/5/20: doorway pulley  · Recommendations/Intent for next treatment session: Next visit will focus on R shoulder ROM, strength, and pain.     Total Treatment Billable Duration:  45 minutes  PT Patient Time In/Time Out  Time In: 1015  Time Out: [address] Phyllis Hernandez, PT, DPT    Future Appointments   Date Time Provider Arpit Nails   7/22/2020  3:30 PM Simuel Sharper [facility]   7/27/2020 10:15 AM [facility]   7/29/2020  3:30 PM [facility]   8/3/2020 10:15 AM [facility]   8/5/2020 11:00 AM [facility]   8/10/2020 10:15 AM [facility]   8/12/2020 11:00 AM [facility]   8/17/2020 10:15 AM [facility]   8/19/2020 11:00 AM [facility]   8/24/2020 10:15 AM [facility]   8/26/2020 11:00 AM [facility]   8/31/2020 10:15 AM [facility]

## 2020-07-22 ENCOUNTER — HOSPITAL ENCOUNTER (OUTPATIENT)
Dept: PHYSICAL THERAPY | Age: 75
Discharge: HOME | End: 2020-07-22
Payer: MEDICARE

## 2020-07-22 PROCEDURE — 97110 THERAPEUTIC EXERCISES: CPT

## 2020-07-22 PROCEDURE — 97016 VASOPNEUMATIC DEVICE THERAPY: CPT

## 2020-07-22 NOTE — PROGRESS NOTES
Yarelis Chapa  : 1945  [place]  [place] [employer] [facility] [address] @ [address]  [phone]   [phone]      OUTPATIENT PHYSICAL THERAPY: Daily Treatment Note 2020  Visit Count:  11    ICD-10: Treatment Diagnosis: Pain in right shoulder (M25.511) and Stiffness of right shoulder, not elsewhere classified (M25.611)  TREATMENT PLAN:  Effective Dates: 2020 TO 9/3/2020 (90 days). Frequency/Duration: 1 time a week for 30 Day(s), 2 times a week for next 60 days  GOALS: (Goals have been discussed and agreed upon with patient.)  Short-Term Functional Goals: Time Frame: 4 weeks  # Goal Status   1 Pt will confirm compliance with home program to facilitate improvement in function. MET     Discharge goals: Time frame: 12 weeks  # Goal Status   1 Pt will be able to elevate UE to at least 115° without symptoms in order to participate in ADLs such as reaching overhead and pulling shirt on.  20: R elevation AAROM to 114 °, R elevation AROM to 108 °  PROGRESSING   2 Pt will be able to reach over head to touch superior angle of opposite scapula without symptoms to be able to participate in ADLs such as reaching overhead and washing head. ONGOING   3 Pt will be able to reach across body to touch spine of opposite scapula without symptoms to be able to participate in ADLs such as reaching tasks. ONGOING   4 Pt will be able to reach behind back to touch lower back without symptoms to be able to participate in dressing and toileting activities. ONGOING   5 Pt will be able to reach behind themselves with arm elevated without symptoms to be able to participate in ADLs such as reaching tasks and grabbing seat belt.  ONGOING     Follow-up: 20    Pre-treatment Symptoms/Complaints:  Pt reports he has not been having shoulder pain but his shoulder has felt tighter since adding resistance exercises. He reports he has been using pulleys to loosen his shoulder up. Pain: Initial:  Pt reported no pain, just tightness  Post Session:  No pain   Medications Last Reviewed: 7/22/2020  Updated Objective Findings: Below measures from initial evaluation unless otherwise noted. 6/5/20  Pain at worst: 7/10  Observation: Incision healing well  ROM:      Left Right   Elevation (°) 141 PROM to 90 per visual estimation   Behind neck reach To TLJ Not tested   Behind back reach To opposite scapula  Not tested    Cross body reach To opposite scapula Not tested   R ER to neutral in adduction. UE ANDT: All within normal limits B  DASH: 45/55    7/8/20:   R elevation AAROM to 114 °, R elevation AROM to 108 °     TREATMENT:   THERAPEUTIC ACTIVITY: (see chart for minutes): Therapeutic activities per grid below to improve mobility, strength, balance and coordination. Required minimal visual, verbal and tactile cues to improve independence with functional mobility and activities. THERAPEUTIC EXERCISE: (see chart for minutes):  Exercises per grid below to improve mobility, strength, balance and coordination. Required minimal visual, verbal and tactile cues to promote proper body alignment and promote proper body mechanics. Progressed resistance, range, repetitions and complexity of movement as indicated. NEUROMUSCULAR RE-EDUCATION: (see chart for minutes):  Exercise/activities per grid below to improve balance, coordination, kinesthetic sense, posture, pain, and proprioception. Required minimal visual, verbal and tactile cues to promote motor control of right, upper extremity(s). MANUAL THERAPY: (see chart for minutes): Joint mobilization, Soft tissue mobilization, skin mobilization, and muscle energy techniques were utilized and necessary because of the patient's restricted joint motion, restricted motion of soft tissue and pain .    MODALITIES: (see chart for minutes):      *  Cold Pack Therapy in order to provide analgesia. Vasopneumatic compression with cryotherapy to control pain and swelling s/p R rTSA. Date: 7/20/20 (visit 10) PN 7/22/20 (visit 11)        Modalities: 15 min 15 min       Game ready to R shoulder with medium compression and coldest setting Repeat  15'        Therapeutic Exercise: 30 min 34 min        UBE AAROM: 3'/3' L4  Scaption: 2x10x2# B   Wall slide: 2x10 R  S/L ER: x10x2#, 10x3# R  S/L abduction: 10x2# R, 10x3# R  Prone row, I: 2x10x3# R   Prone Y: 2x10x1# R  PROM: 5' into ER, IR, and flexion UBE: 3'/3' L4  Scaption: 2x10x2# B   Wall slide: 2x10 R  S/L R ER: 10x3#, 10x2#  S/L R abduction: 10x3#, 10x2#  Prone row, I: 3x10x3# R   Prone Y: 3x10x1# R  PROM: 5' into ER, IR, and flexion       Neuromuscular re-education                  Manual Therapy:                  Therapeutic Activities:                    Home program: 6/5/20: add pulley flexion and isometric (flexion and abduction) to home program    [employer] Portal  Treatment/Session Summary:    · Response to Treatment: Pt tolerated exercises well and demonstrates improved ER ROM with stretching today. · Communication/Consultation:  None today  · Equipment provided today:  6/5/20: doorway pulley  · Recommendations/Intent for next treatment session: Next visit will focus on R shoulder ROM, strength, and pain.     Total Treatment Billable Duration:  49 minutes  PT Patient Time In/Time Out  Time In: 1526  Time Out: [address], PT, DPT    Future Appointments   Date Time Provider Arpit Nails   7/27/2020 10:15 AM Pamula Fetch [facility]   7/29/2020  3:30 PM Pamula Fetch [facility]   8/3/2020 10:15 AM Pamula Fetch [facility]   8/5/2020 11:00 AM Pamula Fetch [facility]   8/10/2020 10:15 AM Pamula Fetch [facility]   8/12/2020 11:00 AM Pamula Fetch [facility]   8/17/2020 10:15 AM [facility] Fetch [facility]   8/19/2020 11:00 AM Pamula Fetch [facility]   8/24/2020 10:15 [facility] 8/26/2020 11:00 AM Orin Ojeda [facility]   8/31/2020 10:15 AM Orin Ojeda [facility]

## 2020-07-27 ENCOUNTER — HOSPITAL ENCOUNTER (OUTPATIENT)
Dept: PHYSICAL THERAPY | Age: 75
Discharge: HOME | End: 2020-07-27
Payer: MEDICARE

## 2020-07-27 PROCEDURE — 97016 VASOPNEUMATIC DEVICE THERAPY: CPT

## 2020-07-27 PROCEDURE — 97110 THERAPEUTIC EXERCISES: CPT

## 2020-07-27 NOTE — PROGRESS NOTES
Luana Waters  : 1945  [place]  [place] [employer] [address] @ [address]  [address] [address].  [phone]   [phone]      OUTPATIENT PHYSICAL THERAPY: Daily Treatment Note 2020  Visit Count:  12    ICD-10: Treatment Diagnosis: Pain in right shoulder (M25.511) and Stiffness of right shoulder, not elsewhere classified (M25.611)  TREATMENT PLAN:  Effective Dates: 2020 TO 9/3/2020 (90 days). Frequency/Duration: 1 time a week for 30 Day(s), 2 times a week for next 60 days  GOALS: (Goals have been discussed and agreed upon with patient.)  Short-Term Functional Goals: Time Frame: 4 weeks  # Goal Status   1 Pt will confirm compliance with home program to facilitate improvement in function. MET     Discharge goals: Time frame: 12 weeks  # Goal Status   1 Pt will be able to elevate UE to at least 115° without symptoms in order to participate in ADLs such as reaching overhead and pulling shirt on.  20: R elevation AAROM to 114 °, R elevation AROM to 108 °  PROGRESSING   2 Pt will be able to reach over head to touch superior angle of opposite scapula without symptoms to be able to participate in ADLs such as reaching overhead and washing head. ONGOING   3 Pt will be able to reach across body to touch spine of opposite scapula without symptoms to be able to participate in ADLs such as reaching tasks. ONGOING   4 Pt will be able to reach behind back to touch lower back without symptoms to be able to participate in dressing and toileting activities. ONGOING   5 Pt will be able to reach behind themselves with arm elevated without symptoms to be able to participate in ADLs such as reaching tasks and grabbing seat belt.  ONGOING     Follow-up: 20    Pre-treatment Symptoms/Complaints:  Pt reports he has been doing his exercises regularly and his shoulder is not getting very tight from them like last week. Pain: Initial:  Pt reported no pain, just tightness  Post Session:  No pain   Medications Last Reviewed: 7/27/2020  Updated Objective Findings: Below measures from initial evaluation unless otherwise noted. 6/5/20  Pain at worst: 7/10  Observation: Incision healing well  ROM:      Left Right   Elevation (°) 141 PROM to 90 per visual estimation   Behind neck reach To TLJ Not tested   Behind back reach To opposite scapula  Not tested    Cross body reach To opposite scapula Not tested   R ER to neutral in adduction. UE ANDT: All within normal limits B  DASH: 45/55    7/8/20:   R elevation AAROM to 114 °, R elevation AROM to 108 °     TREATMENT:   THERAPEUTIC ACTIVITY: (see chart for minutes): Therapeutic activities per grid below to improve mobility, strength, balance and coordination. Required minimal visual, verbal and tactile cues to improve independence with functional mobility and activities. THERAPEUTIC EXERCISE: (see chart for minutes):  Exercises per grid below to improve mobility, strength, balance and coordination. Required minimal visual, verbal and tactile cues to promote proper body alignment and promote proper body mechanics. Progressed resistance, range, repetitions and complexity of movement as indicated. NEUROMUSCULAR RE-EDUCATION: (see chart for minutes):  Exercise/activities per grid below to improve balance, coordination, kinesthetic sense, posture, pain, and proprioception. Required minimal visual, verbal and tactile cues to promote motor control of right, upper extremity(s). MANUAL THERAPY: (see chart for minutes): Joint mobilization, Soft tissue mobilization, skin mobilization, and muscle energy techniques were utilized and necessary because of the patient's restricted joint motion, restricted motion of soft tissue and pain . MODALITIES: (see chart for minutes):      *  Cold Pack Therapy in order to provide analgesia.  Vasopneumatic compression with cryotherapy to control pain and swelling s/p R rTSA. Date: 7/20/20 (visit 10) PN 7/22/20 (visit 11)  7/27/20 (visit 12)      Modalities: 15 min 15 min 15 min      Game ready to R shoulder with medium compression and coldest setting Repeat  15'  15'      Therapeutic Exercise: 30 min 34 min 30 min       UBE AAROM: 3'/3' L4  Scaption: 2x10x2# B   Wall slide: 2x10 R  S/L ER: x10x2#, 10x3# R  S/L abduction: 10x2# R, 10x3# R  Prone row, I: 2x10x3# R   Prone Y: 2x10x1# R  PROM: 5' into ER, IR, and flexion UBE: 3'/3' L4  Scaption: 2x10x2# B   Wall slide: 2x10 R  S/L R ER: 10x3#, 10x2#  S/L R abduction: 10x3#, 10x2#  Prone row, I: 3x10x3# R   Prone Y: 3x10x1# R  PROM: 5' into ER, IR, and flexion UBE: 3'/3' L4  Scaption: 10x2#, 10x3# B  Wall slide: 2x10 R  S/L R ER: 2x10x3#  S/L R abduction: 2x10x3#  Prone row, I: 10x3#, 10x4# R  Prone Y: 10x1#, 10x2# R  PROM: 5' into ER, IR, and flexion      Neuromuscular re-education                  Manual Therapy:                  Therapeutic Activities:                    Home program: 6/5/20: add pulley flexion and isometric (flexion and abduction) to home program    [facility]  Treatment/Session Summary:    · Response to Treatment: Pt tolerated increased resistance well today. · Communication/Consultation:  None today  · Equipment provided today:  6/5/20: doorway pulley  · Recommendations/Intent for next treatment session: Next visit will focus on R shoulder ROM, strength, and pain.     Total Treatment Billable Duration:  45 minutes  PT Patient Time In/Time Out  Time In: 1015  Time Out: [address], PT, DPT    Future Appointments   Date Time Provider Arpit Nails   7/29/2020  3:30 PM Asa Parkinson [facility]   8/3/2020 10:15 AM Asa Parkinson [facility]   8/5/2020 11:00 AM Asa Parkinson [facility]   8/10/2020 10:15 AM Asa FARRELL [facility]   8/12/2020 11:00 AM Asa [facility]   8/17/2020 10:15 AM Asa [facility]   8/19/2020 11:00 AM Seferino Sam Finn Llamas [facility]   8/24/2020 10:15 AM Erlinda HODGSONGROVER [facility]   8/26/2020 11:00 AM Erlinda Sanchez [facility]   8/31/2020 10:15 AM Erlinda Sanchez [facility]

## 2020-07-29 ENCOUNTER — HOSPITAL ENCOUNTER (OUTPATIENT)
Dept: PHYSICAL THERAPY | Age: 75
Discharge: HOME | End: 2020-07-29
Payer: MEDICARE

## 2020-07-29 PROCEDURE — 97016 VASOPNEUMATIC DEVICE THERAPY: CPT

## 2020-07-29 PROCEDURE — 97110 THERAPEUTIC EXERCISES: CPT

## 2020-07-29 NOTE — PROGRESS NOTES
Ran Goff  : 1945  [place]  [place] [facility] - [facility] [address] @ [address]  [phone]   [phone]      OUTPATIENT PHYSICAL THERAPY: Daily Treatment Note 2020  Visit Count:  13   DOS: 20    ICD-10: Treatment Diagnosis: Pain in right shoulder (M25.511) and Stiffness of right shoulder, not elsewhere classified (M25.611)  TREATMENT PLAN:  Effective Dates: 2020 TO 9/3/2020 (90 days). Frequency/Duration: 1 time a week for 30 Day(s), 2 times a week for next 60 days  GOALS: (Goals have been discussed and agreed upon with patient.)  Short-Term Functional Goals: Time Frame: 4 weeks  # Goal Status   1 Pt will confirm compliance with home program to facilitate improvement in function. MET     Discharge goals: Time frame: 12 weeks  # Goal Status   1 Pt will be able to elevate UE to at least 115° without symptoms in order to participate in ADLs such as reaching overhead and pulling shirt on.  20: R elevation AAROM to 114 °, R elevation AROM to 108 °  PROGRESSING   2 Pt will be able to reach over head to touch superior angle of opposite scapula without symptoms to be able to participate in ADLs such as reaching overhead and washing head. ONGOING   3 Pt will be able to reach across body to touch spine of opposite scapula without symptoms to be able to participate in ADLs such as reaching tasks. ONGOING   4 Pt will be able to reach behind back to touch lower back without symptoms to be able to participate in dressing and toileting activities. ONGOING   5 Pt will be able to reach behind themselves with arm elevated without symptoms to be able to participate in ADLs such as reaching tasks and grabbing seat belt.  ONGOING     Follow-up: 20    Pre-treatment Symptoms/Complaints:  Pt reports he has been working on keeping his shoulder blade down and not shrugging with elevation. Pain: Initial:  Pt reported no pain, just tightness  Post Session:  No pain   Medications Last Reviewed: 7/29/2020  Updated Objective Findings: Below measures from initial evaluation unless otherwise noted. 6/5/20  Pain at worst: 7/10  Observation: Incision healing well  ROM:      Left Right   Elevation (°) 141 PROM to 90 per visual estimation   Behind neck reach To TLJ Not tested   Behind back reach To opposite scapula  Not tested    Cross body reach To opposite scapula Not tested   R ER to neutral in adduction. UE ANDT: All within normal limits B  DASH: 45/55    7/8/20:   R elevation AAROM to 114 °, R elevation AROM to 108 °     TREATMENT:   THERAPEUTIC ACTIVITY: (see chart for minutes): Therapeutic activities per grid below to improve mobility, strength, balance and coordination. Required minimal visual, verbal and tactile cues to improve independence with functional mobility and activities. THERAPEUTIC EXERCISE: (see chart for minutes):  Exercises per grid below to improve mobility, strength, balance and coordination. Required minimal visual, verbal and tactile cues to promote proper body alignment and promote proper body mechanics. Progressed resistance, range, repetitions and complexity of movement as indicated. NEUROMUSCULAR RE-EDUCATION: (see chart for minutes):  Exercise/activities per grid below to improve balance, coordination, kinesthetic sense, posture, pain, and proprioception. Required minimal visual, verbal and tactile cues to promote motor control of right, upper extremity(s). MANUAL THERAPY: (see chart for minutes): Joint mobilization, Soft tissue mobilization, skin mobilization, and muscle energy techniques were utilized and necessary because of the patient's restricted joint motion, restricted motion of soft tissue and pain . MODALITIES: (see chart for minutes):      *  Cold Pack Therapy in order to provide analgesia.  Vasopneumatic compression with cryotherapy to control pain and swelling s/p R rTSA. Date: 7/20/20 (visit 10) PN 7/22/20 (visit 11)  7/27/20 (visit 12) 7/29/20 (visit 13)      Modalities: 15 min 15 min 15 min 15 min     Game ready to R shoulder with medium compression and coldest setting Repeat  15'  15' 15'      Therapeutic Exercise: 30 min 34 min 30 min 34 min      UBE AAROM: 3'/3' L4  Scaption: 2x10x2# B   Wall slide: 2x10 R  S/L ER: x10x2#, 10x3# R  S/L abduction: 10x2# R, 10x3# R  Prone row, I: 2x10x3# R   Prone Y: 2x10x1# R  PROM: 5' into ER, IR, and flexion UBE: 3'/3' L4  Scaption: 2x10x2# B   Wall slide: 2x10 R  S/L R ER: 10x3#, 10x2#  S/L R abduction: 10x3#, 10x2#  Prone row, I: 3x10x3# R   Prone Y: 3x10x1# R  PROM: 5' into ER, IR, and flexion UBE: 3'/3' L4  Scaption: 10x2#, 10x3# B  Wall slide: 2x10 R  S/L R ER: 2x10x3#  S/L R abduction: 2x10x3#  Prone row, I: 10x3#, 10x4# R  Prone Y: 10x1#, 10x2# R  PROM: 5' into ER, IR, and flexion UBE: 3'/3' L4  Scaption: 2x10x2# B  Wall slide: 2x10 R  S/L R ER: 10x3#, 10x4#  S/L R abduction: 10x3#, 10x4#  Prone row, I: 2x10x4# R  Prone Y: 2x10x2# R  PROM: 5' into ER, IR, and flexion     Neuromuscular re-education                  Manual Therapy:                  Therapeutic Activities:                    Home program: 6/5/20: add pulley flexion and isometric (flexion and abduction) to home program    [facility]  Treatment/Session Summary:    · Response to Treatment: Pt tolerated increase in weight well today. He reported exercises were challenging but not painful. · Communication/Consultation:  None today  · Equipment provided today:  6/5/20: doorway pulley  · Recommendations/Intent for next treatment session: Next visit will focus on R shoulder ROM, strength, and pain.     Total Treatment Billable Duration:  49 minutes  PT Patient Time In/Time Out  Time In: 8616  Time Out: [address], PT, DPT    Future Appointments   Date Time Provider Arpit Nails   8/3/2020 10:15 AM [facility]   8/5/2020 11:00 AM Diarichard Daniel [facility]   8/10/2020 10:15 AM Diania Daniel [facility]   8/12/2020 11:00 AM Diarichard Daniel [facility]   8/17/2020 10:15 AM Diarichard Daniel [facility]   8/19/2020 11:00 AM Diarichard Daniel [facility]   8/24/2020 10:15 AM Diarichard Daniel [facility]   8/26/2020 11:00 AM Diarichard Sanchez [facility]   8/31/2020 10:15 AM Diarichard Sanchez [facility]

## 2020-08-03 ENCOUNTER — HOSPITAL ENCOUNTER (OUTPATIENT)
Dept: PHYSICAL THERAPY | Age: 75
Discharge: HOME | End: 2020-08-03
Payer: MEDICARE

## 2020-08-03 PROCEDURE — 97016 VASOPNEUMATIC DEVICE THERAPY: CPT

## 2020-08-03 PROCEDURE — 97110 THERAPEUTIC EXERCISES: CPT

## 2020-08-03 NOTE — PROGRESS NOTES
April Dutta  : 1945  [place]  [place]: [place] [address]  [phone]   [phone]      OUTPATIENT PHYSICAL THERAPY: Daily Treatment and progress Note 8/3/2020  Visit Count:  14   DOS: 20    ICD-10: Treatment Diagnosis: Pain in right shoulder (M25.511) and Stiffness of right shoulder, not elsewhere classified (M25.611)  TREATMENT PLAN:  Effective Dates: 2020 TO 9/3/2020 (90 days). Frequency/Duration: 1 time a week for 30 Day(s), 2 times a week for next 60 days  GOALS: (Goals have been discussed and agreed upon with patient.)  Short-Term Functional Goals: Time Frame: 4 weeks  # Goal Status   1 Pt will confirm compliance with home program to facilitate improvement in function. MET     Discharge goals: Time frame: 12 weeks  # Goal Status   1 Pt will be able to elevate UE to at least 115° without symptoms in order to participate in ADLs such as reaching overhead and pulling shirt on.  20: R elevation AAROM to 114 °, R elevation AROM to 108 °   8/3/20: R UE active elevation to 115 °  MET   2 Pt will be able to reach over head to touch superior angle of opposite scapula without symptoms to be able to participate in ADLs such as reaching overhead and washing head. 8/3/20: pt able to reach to posterior neck ONGOING   3 Pt will be able to reach across body to touch spine of opposite scapula without symptoms to be able to participate in ADLs such as reaching tasks. 8/3/20: pt able to touch opposite AC joint ONGOING   4 Pt will be able to reach behind back to touch lower back without symptoms to be able to participate in dressing and toileting activities. ONGOING   5 Pt will be able to reach behind themselves with arm elevated without symptoms to be able to participate in ADLs such as reaching tasks and grabbing seat belt.  ONGOING     Follow-up: 8/4/20    Pre-treatment Symptoms/Complaints:  Pt reports he has been doing his stretches daily. He reports he felt very tight on Saturday but his shoulder has been feeling more mobile since then. He is to see MD tomorrow. Pain: Initial:  Pt reported no pain, just tightness  Post Session:  No pain   Medications Last Reviewed: 8/3/2020  Updated Objective Findings: Below measures from initial evaluation unless otherwise noted. 6/5/20  Pain at worst: 7/10  Observation: Incision healing well  ROM:      Left Right   Elevation (°) 141 PROM to 90 per visual estimation   Behind neck reach To TLJ Not tested   Behind back reach To opposite scapula  Not tested    Cross body reach To opposite scapula Not tested   R ER to neutral in adduction. UE ANDT: All within normal limits B  DASH: 45/55    7/8/20:   R elevation AAROM to 114 °, R elevation AROM to 108 °     8/3/20:   R elevation AROM to 115 °     TREATMENT:   THERAPEUTIC ACTIVITY: (see chart for minutes): Therapeutic activities per grid below to improve mobility, strength, balance and coordination. Required minimal visual, verbal and tactile cues to improve independence with functional mobility and activities. THERAPEUTIC EXERCISE: (see chart for minutes):  Exercises per grid below to improve mobility, strength, balance and coordination. Required minimal visual, verbal and tactile cues to promote proper body alignment and promote proper body mechanics. Progressed resistance, range, repetitions and complexity of movement as indicated. NEUROMUSCULAR RE-EDUCATION: (see chart for minutes):  Exercise/activities per grid below to improve balance, coordination, kinesthetic sense, posture, pain, and proprioception. Required minimal visual, verbal and tactile cues to promote motor control of right, upper extremity(s).   MANUAL THERAPY: (see chart for minutes): Joint mobilization, Soft tissue mobilization, skin mobilization, and muscle energy techniques were utilized and necessary because of the patient's restricted joint motion, restricted motion of soft tissue and pain . MODALITIES: (see chart for minutes):      *  Cold Pack Therapy in order to provide analgesia. Vasopneumatic compression with cryotherapy to control pain and swelling s/p R rTSA.     Date: 7/20/20 (visit 10) PN 7/22/20 (visit 11)  7/27/20 (visit 12) 7/29/20 (visit 13)  8/3/20 (visit 14)     Modalities: 15 min 15 min 15 min 15 min 15 min    Game ready to R shoulder with medium compression and coldest setting Repeat  15'  15' 15'  15'     Therapeutic Exercise: 30 min 34 min 30 min 34 min 34 min     UBE AAROM: 3'/3' L4  Scaption: 2x10x2# B   Wall slide: 2x10 R  S/L ER: x10x2#, 10x3# R  S/L abduction: 10x2# R, 10x3# R  Prone row, I: 2x10x3# R   Prone Y: 2x10x1# R  PROM: 5' into ER, IR, and flexion UBE: 3'/3' L4  Scaption: 2x10x2# B   Wall slide: 2x10 R  S/L R ER: 10x3#, 10x2#  S/L R abduction: 10x3#, 10x2#  Prone row, I: 3x10x3# R   Prone Y: 3x10x1# R  PROM: 5' into ER, IR, and flexion UBE: 3'/3' L4  Scaption: 10x2#, 10x3# B  Wall slide: 2x10 R  S/L R ER: 2x10x3#  S/L R abduction: 2x10x3#  Prone row, I: 10x3#, 10x4# R  Prone Y: 10x1#, 10x2# R  PROM: 5' into ER, IR, and flexion UBE: 3'/3' L4  Scaption: 2x10x2# B  Wall slide: 2x10 R  S/L R ER: 10x3#, 10x4#  S/L R abduction: 10x3#, 10x4#  Prone row, I: 2x10x4# R  Prone Y: 2x10x2# R  PROM: 5' into ER, IR, and flexion UBE: 3'/3' L4  Scaption: 2x10x2# B  Wall slide: 2x10 R  S/L R ER: 2x10x4#  S/L R abduction: 2x10x4#  Prone row, I: 2x10x4# R  Prone Y: 2x10x2# R  Prone Y and ER: 2x10 AROM   PROM: 3' into ER, IR, and flexion    Neuromuscular re-education                  Manual Therapy:                  Therapeutic Activities:                    Home program: 6/5/20: add pulley flexion and isometric (flexion and abduction) to home program    [employer] Portal  Treatment/Session Summary:    · Response to Treatment: Pt demonstrates significant improvement in ROM and strength of R shoulder. He continues to be limited with ROM such as reaching behind head and back. He has continued to avoid reaching behind back per his precautions. He is to see MD tomorrow. He will continue to benefit from skilled physical therapy to address his limitations. · Communication/Consultation:  None today  · Equipment provided today:  6/5/20: doorway pulley  · Recommendations/Intent for next treatment session: Next visit will focus on R shoulder ROM, strength, and pain.     Total Treatment Billable Duration:  49 minutes  PT Patient Time In/Time Out  Time In: 1016  Time Out: [address] Maricarmen Shannon, PT, DPT    Future Appointments   Date Time Provider Arpit Nails   8/5/2020 11:00 AM Ally Burciaga [facility]   8/10/2020 10:15 AM [facility]   8/12/2020 11:00 AM [facility]   8/17/2020 10:15 AM [facility]   8/19/2020 11:00 [facility]   8/24/2020 10:15 AM [facility]   8/26/2020 11:00 AM [facility]   8/31/2020 10:15 AM Ally [facility]

## 2020-08-05 ENCOUNTER — HOSPITAL ENCOUNTER (OUTPATIENT)
Dept: PHYSICAL THERAPY | Age: 75
Discharge: HOME | End: 2020-08-05
Payer: MEDICARE

## 2020-08-05 PROCEDURE — 97016 VASOPNEUMATIC DEVICE THERAPY: CPT

## 2020-08-05 PROCEDURE — 97110 THERAPEUTIC EXERCISES: CPT

## 2020-08-05 NOTE — PROGRESS NOTES
Catalina Estevezyvan  : 1945  [place]  [place] [address] @ [address]  [phone]   [phone]      OUTPATIENT PHYSICAL THERAPY: Daily Treatment and progress Note 2020  Visit Count:  15   DOS: 20    ICD-10: Treatment Diagnosis: Pain in right shoulder (M25.511) and Stiffness of right shoulder, not elsewhere classified (M25.611)  TREATMENT PLAN:  Effective Dates: 2020 TO 9/3/2020 (90 days). Frequency/Duration: 1 time a week for 30 Day(s), 2 times a week for next 60 days  GOALS: (Goals have been discussed and agreed upon with patient.)  Short-Term Functional Goals: Time Frame: 4 weeks  # Goal Status   1 Pt will confirm compliance with home program to facilitate improvement in function. MET     Discharge goals: Time frame: 12 weeks  # Goal Status   1 Pt will be able to elevate UE to at least 115° without symptoms in order to participate in ADLs such as reaching overhead and pulling shirt on.  20: R elevation AAROM to 114 °, R elevation AROM to 108 °   8/3/20: R UE active elevation to 115 °  MET   2 Pt will be able to reach over head to touch superior angle of opposite scapula without symptoms to be able to participate in ADLs such as reaching overhead and washing head. 8/3/20: pt able to reach to posterior neck ONGOING   3 Pt will be able to reach across body to touch spine of opposite scapula without symptoms to be able to participate in ADLs such as reaching tasks. 8/3/20: pt able to touch opposite AC joint ONGOING   4 Pt will be able to reach behind back to touch lower back without symptoms to be able to participate in dressing and toileting activities. 20: lower aspect of R glutes ONGOING   5 Pt will be able to reach behind themselves with arm elevated without symptoms to be able to participate in ADLs such as reaching tasks and grabbing seat belt. ONGOING     Follow-up: 9/15/20    Pre-treatment Symptoms/Complaints:  Pt saw MD yesterday. He reports MD released him from all precautions except to be careful not to overdo it. Pain: Initial:  Pt reported no pain, just tightness  Post Session:  No pain   Medications Last Reviewed: 8/5/2020  Updated Objective Findings: Below measures from initial evaluation unless otherwise noted. 6/5/20  Pain at worst: 7/10  Observation: Incision healing well  ROM:      Left Right   Elevation (°) 141 PROM to 90 per visual estimation   Behind neck reach To TLJ Not tested   Behind back reach To opposite scapula  Not tested    Cross body reach To opposite scapula Not tested   R ER to neutral in adduction. UE ANDT: All within normal limits B  DASH: 45/55    7/8/20:   R elevation AAROM to 114 °, R elevation AROM to 108 °     8/3/20:   R elevation AROM to 115 °     TREATMENT:   THERAPEUTIC ACTIVITY: (see chart for minutes): Therapeutic activities per grid below to improve mobility, strength, balance and coordination. Required minimal visual, verbal and tactile cues to improve independence with functional mobility and activities. THERAPEUTIC EXERCISE: (see chart for minutes):  Exercises per grid below to improve mobility, strength, balance and coordination. Required minimal visual, verbal and tactile cues to promote proper body alignment and promote proper body mechanics. Progressed resistance, range, repetitions and complexity of movement as indicated. NEUROMUSCULAR RE-EDUCATION: (see chart for minutes):  Exercise/activities per grid below to improve balance, coordination, kinesthetic sense, posture, pain, and proprioception. Required minimal visual, verbal and tactile cues to promote motor control of right, upper extremity(s).   MANUAL THERAPY: (see chart for minutes): Joint mobilization, Soft tissue mobilization, skin mobilization, and muscle energy techniques were utilized and necessary because of the patient's restricted joint motion, restricted motion of soft tissue and pain . MODALITIES: (see chart for minutes):      *  Cold Pack Therapy in order to provide analgesia. Vasopneumatic compression with cryotherapy to control pain and swelling s/p R rTSA.     Date: 7/20/20 (visit 10) PN 7/22/20 (visit 11)  7/27/20 (visit 12) 7/29/20 (visit 13)  8/3/20 (visit 14)  8/5/20 (visit 15)    Modalities: 15 min 15 min 15 min 15 min 15 min 15 min   Game ready to R shoulder with medium compression and coldest setting Repeat  15'  15' 15'  15'  15'    Therapeutic Exercise: 30 min 34 min 30 min 34 min 34 min 30 min    UBE AAROM: 3'/3' L4  Scaption: 2x10x2# B   Wall slide: 2x10 R  S/L ER: x10x2#, 10x3# R  S/L abduction: 10x2# R, 10x3# R  Prone row, I: 2x10x3# R   Prone Y: 2x10x1# R  PROM: 5' into ER, IR, and flexion UBE: 3'/3' L4  Scaption: 2x10x2# B   Wall slide: 2x10 R  S/L R ER: 10x3#, 10x2#  S/L R abduction: 10x3#, 10x2#  Prone row, I: 3x10x3# R   Prone Y: 3x10x1# R  PROM: 5' into ER, IR, and flexion UBE: 3'/3' L4  Scaption: 10x2#, 10x3# B  Wall slide: 2x10 R  S/L R ER: 2x10x3#  S/L R abduction: 2x10x3#  Prone row, I: 10x3#, 10x4# R  Prone Y: 10x1#, 10x2# R  PROM: 5' into ER, IR, and flexion UBE: 3'/3' L4  Scaption: 2x10x2# B  Wall slide: 2x10 R  S/L R ER: 10x3#, 10x4#  S/L R abduction: 10x3#, 10x4#  Prone row, I: 2x10x4# R  Prone Y: 2x10x2# R  PROM: 5' into ER, IR, and flexion UBE: 3'/3' L4  Scaption: 2x10x2# B  Wall slide: 2x10 R  S/L R ER: 2x10x4#  S/L R abduction: 2x10x4#  Prone row, I: 2x10x4# R  Prone Y: 2x10x2# R  Prone Y and ER: 2x10 AROM   PROM: 3' into ER, IR, and flexion UBE: 3'/3' L4  Scaption: 2x10x2# B  Abduction: 2x10x2# B  Behind back stretch with strap: x8 holds R  Behind neck stretch with strap: x8 holds R  Cross body stretch: x3 holds   S/L R ER: 2x10x4#  S/L R abduction: 2x10x4#  Prone row, I: 2x10x4# R  Prone Y: 2x10x2# R  Prone T: 2x10 AROM  Supine fly: 10x2# B   PROM: 3' into ER, IR, and flexion   Neuromuscular re-education                  Manual Therapy:                  Therapeutic Activities:                    Home program: 6/5/20: add pulley flexion and isometric (flexion and abduction) to home program  8/5/20: pt given printouts of behind back stretch with strap and cross body stretch    [employer] Portal  Treatment/Session Summary:    · Response to Treatment: Pt tolerated new stretches well today. He reports reaching behind back very tight and he will begin working on that. · Communication/Consultation:  None today  · Equipment provided today:  6/5/20: doorway pulley  · Recommendations/Intent for next treatment session: Next visit will focus on R shoulder ROM, strength.     Total Treatment Billable Duration:  50 minutes  PT Patient Time In/Time Out  Time In: 1110  Time Out: Derrick Mg, PT, DPT    Future Appointments   Date Time Provider Arpit Nails   8/10/2020 10:15 AM Axel Denver [facility]   8/12/2020 11:00 AM Axel [place] [facility]   8/17/2020 10:15 AM [place] [facility]   8/19/2020 11:00 AM [place] [facility]   8/24/2020 10:15 AM [place] [facility]   8/26/2020 11:00 AM [place] [facility]   8/31/2020 10:15 AM [place] [facility]

## 2020-08-10 ENCOUNTER — HOSPITAL ENCOUNTER (OUTPATIENT)
Dept: PHYSICAL THERAPY | Age: 75
Discharge: HOME | End: 2020-08-10
Payer: MEDICARE

## 2020-08-10 PROCEDURE — 97110 THERAPEUTIC EXERCISES: CPT

## 2020-08-10 PROCEDURE — 97016 VASOPNEUMATIC DEVICE THERAPY: CPT

## 2020-08-10 NOTE — PROGRESS NOTES
Mayra Bailon  : 1945  [place]  [place] [address]  [phone]   [phone]      OUTPATIENT PHYSICAL THERAPY: Daily Treatment and progress Note 8/10/2020  Visit Count:  16   DOS: 20    ICD-10: Treatment Diagnosis: Pain in right shoulder (M25.511) and Stiffness of right shoulder, not elsewhere classified (M25.611)  TREATMENT PLAN:  Effective Dates: 2020 TO 9/3/2020 (90 days). Frequency/Duration: 1 time a week for 30 Day(s), 2 times a week for next 60 days  GOALS: (Goals have been discussed and agreed upon with patient.)  Short-Term Functional Goals: Time Frame: 4 weeks  # Goal Status   1 Pt will confirm compliance with home program to facilitate improvement in function. MET     Discharge goals: Time frame: 12 weeks  # Goal Status   1 Pt will be able to elevate UE to at least 115° without symptoms in order to participate in ADLs such as reaching overhead and pulling shirt on.  20: R elevation AAROM to 114 °, R elevation AROM to 108 °   8/3/20: R UE active elevation to 115 °  MET   2 Pt will be able to reach over head to touch superior angle of opposite scapula without symptoms to be able to participate in ADLs such as reaching overhead and washing head. 8/3/20: pt able to reach to posterior neck ONGOING   3 Pt will be able to reach across body to touch spine of opposite scapula without symptoms to be able to participate in ADLs such as reaching tasks. 8/3/20: pt able to touch opposite AC joint ONGOING   4 Pt will be able to reach behind back to touch lower back without symptoms to be able to participate in dressing and toileting activities. 20: lower aspect of R glutes ONGOING   5 Pt will be able to reach behind themselves with arm elevated without symptoms to be able to participate in ADLs such as reaching tasks and grabbing seat belt. ONGOING     Follow-up: 9/15/20    Pre-treatment Symptoms/Complaints:  Pt reports he has continued to work on his stretches including the new stretches. Pain: Initial:  Pt reported no pain Post Session:  No pain   Medications Last Reviewed: 8/10/2020  Updated Objective Findings: Below measures from initial evaluation unless otherwise noted. 6/5/20  Pain at worst: 7/10  Observation: Incision healing well  ROM:      Left Right   Elevation (°) 141 PROM to 90 per visual estimation   Behind neck reach To TLJ Not tested   Behind back reach To opposite scapula  Not tested    Cross body reach To opposite scapula Not tested   R ER to neutral in adduction. UE ANDT: All within normal limits B  DASH: 45/55    7/8/20:   R elevation AAROM to 114 °, R elevation AROM to 108 °     8/3/20:   R elevation AROM to 115 °     TREATMENT:   THERAPEUTIC ACTIVITY: (see chart for minutes): Therapeutic activities per grid below to improve mobility, strength, balance and coordination. Required minimal visual, verbal and tactile cues to improve independence with functional mobility and activities. THERAPEUTIC EXERCISE: (see chart for minutes):  Exercises per grid below to improve mobility, strength, balance and coordination. Required minimal visual, verbal and tactile cues to promote proper body alignment and promote proper body mechanics. Progressed resistance, range, repetitions and complexity of movement as indicated. NEUROMUSCULAR RE-EDUCATION: (see chart for minutes):  Exercise/activities per grid below to improve balance, coordination, kinesthetic sense, posture, pain, and proprioception. Required minimal visual, verbal and tactile cues to promote motor control of right, upper extremity(s).   MANUAL THERAPY: (see chart for minutes): Joint mobilization, Soft tissue mobilization, skin mobilization, and muscle energy techniques were utilized and necessary because of the patient's restricted joint motion, restricted motion of soft tissue and pain . MODALITIES: (see chart for minutes):      *  Cold Pack Therapy in order to provide analgesia. Vasopneumatic compression with cryotherapy to control pain and swelling s/p R rTSA. Date: 8/5/20 (visit 15)  8/10/20 (visit 16)        Modalities: 15 min 15 min       Game ready to R shoulder with medium compression and coldest setting 15'  15'        Therapeutic Exercise: 30 min 40 min        UBE: 3'/3' L4  Scaption: 2x10x2# B  Abduction: 2x10x2# B  Behind back stretch with strap: x8 holds R  Behind neck stretch with strap: x8 holds R  Cross body stretch: x3 holds   S/L R ER: 2x10x4#  S/L R abduction: 2x10x4#  Prone row, I: 2x10x4# R  Prone Y: 2x10x2# R  Prone T: 2x10 AROM  Supine fly: 10x2# B   PROM: 3' into ER, IR, and flexion UBE: 3'/3' L4  Scaption: 10x2# B  Behind back stretch with strap: x3 holds R  Behind neck stretch with strap: x3 holds R  Cross body stretch: x3 holds   S/L R ER: 2x10x4#  S/L R abduction: 2x10x4#  Band ER: 2x10 red  Band IR: 2x10 red  Band row: 2x10 blue  Band extension: 2x10 blue  D2 flexion: [id number]# B  D2 extension: 6x75o00# B  PROM: x5' into ER, IR, and flexion       Neuromuscular re-education                  Manual Therapy:                  Therapeutic Activities:                    Home program: 6/5/20: add pulley flexion and isometric (flexion and abduction) to home program  8/5/20: pt given printouts of behind back stretch with strap and cross body stretch    [employer] Portal  Treatment/Session Summary:    · Response to Treatment: Pt tolerated new exercises well. He reported band ER was most challenging of new exercises. · Communication/Consultation:  None today  · Equipment provided today:  6/5/20: doorway pulley. 8/10/20: red band   · Recommendations/Intent for next treatment session: Next visit will focus on R shoulder ROM, strength.     Total Treatment Billable Duration:  50 minutes  PT Patient Time In/Time Out  Time In: 1010  Time Out: [address] Dana Oliver, PT, DPT    Future Appointments   Date Time Provider Arpit Nails   8/12/2020 11:00 AM [facility]avel [facility]   8/17/2020 10:15 AM Pavel [facility]OF[facility]   8/19/2020 11:00 AM Pavel [facility]   8/24/2020 10:15 AM Pavel [facility]   8/26/2020 11:00 AM Pavel Joshi [facility]   8/31/2020 10:15 AM Pavel [facility]

## 2020-08-12 ENCOUNTER — HOSPITAL ENCOUNTER (OUTPATIENT)
Dept: PHYSICAL THERAPY | Age: 75
Discharge: HOME | End: 2020-08-12
Payer: MEDICARE

## 2020-08-12 PROCEDURE — 97110 THERAPEUTIC EXERCISES: CPT

## 2020-08-12 PROCEDURE — 97016 VASOPNEUMATIC DEVICE THERAPY: CPT

## 2020-08-12 NOTE — PROGRESS NOTES
Ede aMdison  : 1945  [place]  [place]: [place] [address]  [address] @ [address]  [phone]   [phone]      OUTPATIENT PHYSICAL THERAPY: Daily Treatment and progress Note 2020  Visit Count:  17   DOS: 20    ICD-10: Treatment Diagnosis: Pain in right shoulder (M25.511) and Stiffness of right shoulder, not elsewhere classified (M25.611)  TREATMENT PLAN:  Effective Dates: 2020 TO 9/3/2020 (90 days). Frequency/Duration: 1 time a week for 30 Day(s), 2 times a week for next 60 days  GOALS: (Goals have been discussed and agreed upon with patient.)  Short-Term Functional Goals: Time Frame: 4 weeks  # Goal Status   1 Pt will confirm compliance with home program to facilitate improvement in function. MET     Discharge goals: Time frame: 12 weeks  # Goal Status   1 Pt will be able to elevate UE to at least 115° without symptoms in order to participate in ADLs such as reaching overhead and pulling shirt on.  20: R elevation AAROM to 114 °, R elevation AROM to 108 °   8/3/20: R UE active elevation to 115 °  MET   2 Pt will be able to reach over head to touch superior angle of opposite scapula without symptoms to be able to participate in ADLs such as reaching overhead and washing head. 8/3/20: pt able to reach to posterior neck ONGOING   3 Pt will be able to reach across body to touch spine of opposite scapula without symptoms to be able to participate in ADLs such as reaching tasks. 8/3/20: pt able to touch opposite AC joint ONGOING   4 Pt will be able to reach behind back to touch lower back without symptoms to be able to participate in dressing and toileting activities. 20: lower aspect of R glutes ONGOING   5 Pt will be able to reach behind themselves with arm elevated without symptoms to be able to participate in ADLs such as reaching tasks and grabbing seat belt. ONGOING     Follow-up: 9/15/20    Pre-treatment Symptoms/Complaints:  Pt reports he hasn't been able to do his exercises because his foot started hurting 2 days ago. He reports he doesn't know why it hurt but it has gotten better over the last day but he may contact MD tomorrow. Pain: Initial:  Pt reported no pain in shoulder though his R foot hurt Post Session:  No pain   Medications Last Reviewed: 8/12/2020  Updated Objective Findings: Below measures from initial evaluation unless otherwise noted. 6/5/20  Pain at worst: 7/10  Observation: Incision healing well  ROM:      Left Right   Elevation (°) 141 PROM to 90 per visual estimation   Behind neck reach To TLJ Not tested   Behind back reach To opposite scapula  Not tested    Cross body reach To opposite scapula Not tested   R ER to neutral in adduction. UE ANDT: All within normal limits B  DASH: 45/55    7/8/20:   R elevation AAROM to 114 °, R elevation AROM to 108 °     8/3/20:   R elevation AROM to 115 °     TREATMENT:   THERAPEUTIC ACTIVITY: (see chart for minutes): Therapeutic activities per grid below to improve mobility, strength, balance and coordination. Required minimal visual, verbal and tactile cues to improve independence with functional mobility and activities. THERAPEUTIC EXERCISE: (see chart for minutes):  Exercises per grid below to improve mobility, strength, balance and coordination. Required minimal visual, verbal and tactile cues to promote proper body alignment and promote proper body mechanics. Progressed resistance, range, repetitions and complexity of movement as indicated. NEUROMUSCULAR RE-EDUCATION: (see chart for minutes):  Exercise/activities per grid below to improve balance, coordination, kinesthetic sense, posture, pain, and proprioception. Required minimal visual, verbal and tactile cues to promote motor control of right, upper extremity(s).   MANUAL THERAPY: (see chart for minutes): Joint mobilization, Soft tissue mobilization, skin mobilization, and muscle energy techniques were utilized and necessary because of the patient's restricted joint motion, restricted motion of soft tissue and pain . MODALITIES: (see chart for minutes):      *  Cold Pack Therapy in order to provide analgesia. Vasopneumatic compression with cryotherapy to control pain and swelling s/p R rTSA.     Date: 8/5/20 (visit 15)  8/10/20 (visit 16)  8/12/20 (visit 17)       Modalities: 15 min 15 min 15 min      Game ready to R shoulder with medium compression and coldest setting 15'  15'  15' Game ready and bag of ice on R foot      Therapeutic Exercise: 30 min 40 min 40 min       UBE: 3'/3' L4  Scaption: 2x10x2# B  Abduction: 2x10x2# B  Behind back stretch with strap: x8 holds R  Behind neck stretch with strap: x8 holds R  Cross body stretch: x3 holds   S/L R ER: 2x10x4#  S/L R abduction: 2x10x4#  Prone row, I: 2x10x4# R  Prone Y: 2x10x2# R  Prone T: 2x10 AROM  Supine fly: 10x2# B   PROM: 3' into ER, IR, and flexion UBE: 3'/3' L4  Scaption: 10x2# B  Behind back stretch with strap: x3 holds R  Behind neck stretch with strap: x3 holds R  Cross body stretch: x3 holds   S/L R ER: 2x10x4#  S/L R abduction: 2x10x4#  Band ER: 2x10 red  Band IR: 2x10 red  Band row: 2x10 blue  Band extension: 2x10 blue  D2 flexion: 3b09t52# B  D2 extension: 8z66m64# B  PROM: x5' into ER, IR, and flexion UBE: 3'/3' L4  Scaption: 2x10x2# B  Abduction: 2x10x2# B  Behind back stretch with strap: x3 holds R  Band ER: 2x10 red  Band pull-apart: 2x10 red   Band row: 1o90c40# B  D2 flexion: 10x10#, 10x12.5# B  D2 extension: 10x10#, 10x12.5# B  Supine IR in abduction: 10x2# B with much tightness   PROM: x5' into ER, IR, and flexion      Neuromuscular re-education                  Manual Therapy:                  Therapeutic Activities:                    Home program: 6/5/20: add pulley flexion and isometric (flexion and abduction) to home program  8/5/20: pt given printouts of behind back stretch with strap and cross body stretch    [employer] Portal  Treatment/Session Summary:    · Response to Treatment: Pt advanced to supine IR in abduction and he was very tight with this motion. He was educated to begin working on this at home. · Communication/Consultation:  None today  · Equipment provided today:  6/5/20: doorway pulley. 8/10/20: red band   · Recommendations/Intent for next treatment session: Next visit will focus on R shoulder ROM, strength.     Total Treatment Billable Duration:  55 minutes  PT Patient Time In/Time Out  Time In: 1100  Time Out: Derrick Nguyen, PT, DPT    Future Appointments   Date Time Provider Arpit Nails   8/17/2020 10:15 AM Bennetta Angelinag [facility]   8/21/2020 10:15 AM Bennetta Angelinag [facility]   8/24/2020 10:15 AM Bennetta Dung [facility]   8/26/2020 11:00 AM Bennetta Angelinag [facility]   8/31/2020 10:15 AM Bennetta Angelinag [facility]

## 2020-08-17 ENCOUNTER — APPOINTMENT (OUTPATIENT)
Dept: PHYSICAL THERAPY | Age: 75
End: 2020-08-17
Payer: MEDICARE

## 2020-08-18 ENCOUNTER — HOSPITAL ENCOUNTER (OUTPATIENT)
Dept: PHYSICAL THERAPY | Age: 75
Discharge: HOME | End: 2020-08-18
Payer: MEDICARE

## 2020-08-18 PROCEDURE — 97110 THERAPEUTIC EXERCISES: CPT

## 2020-08-18 PROCEDURE — 97016 VASOPNEUMATIC DEVICE THERAPY: CPT

## 2020-08-18 NOTE — PROGRESS NOTES
John Bonilla  : 1945  [place]  [place] [address]  [address]  [phone]   [phone]      OUTPATIENT PHYSICAL THERAPY: Daily Treatment Note 2020  Visit Count:  18   DOS: 20    ICD-10: Treatment Diagnosis: Pain in right shoulder (M25.511) and Stiffness of right shoulder, not elsewhere classified (M25.611)  TREATMENT PLAN:  Effective Dates: 2020 TO 9/3/2020 (90 days). Frequency/Duration: 1 time a week for 30 Day(s), 2 times a week for next 60 days  GOALS: (Goals have been discussed and agreed upon with patient.)  Short-Term Functional Goals: Time Frame: 4 weeks  # Goal Status   1 Pt will confirm compliance with home program to facilitate improvement in function. MET     Discharge goals: Time frame: 12 weeks  # Goal Status   1 Pt will be able to elevate UE to at least 115° without symptoms in order to participate in ADLs such as reaching overhead and pulling shirt on.  20: R elevation AAROM to 114 °, R elevation AROM to 108 °   8/3/20: R UE active elevation to 115 °   20: R UE elevation to 120 °  MET   2 Pt will be able to reach over head to touch CTJ without symptoms to be able to participate in ADLs such as reaching overhead and washing head. 8/3/20: pt able to reach to posterior neck UPDATED 20   3 Pt will be able to reach across body to touch spine of opposite scapula without symptoms to be able to participate in ADLs such as reaching tasks. 8/3/20: pt able to touch opposite AC joint ONGOING   4 Pt will be able to reach behind back to touch lower back without symptoms to be able to participate in dressing and toileting activities.   20: lower aspect of R glutes  20: to iliac crest  ONGOING   5 Pt will be able to reach behind themselves with arm elevated without symptoms to be able to participate in ADLs such as reaching tasks and grabbing seat belt. ONGOING     Follow-up: 9/15/20    Pre-treatment Symptoms/Complaints:  Pt reports his foot is feeling much better. He reports his shoulder feels looser, in general, at rest and with reaching activities. He reports he can tell reaching forward feels easier and that driving has gotten easier. Pt reports he has to leave at 0930 today. Pain: Initial:  Pt reported no pain  Post Session:  No pain   Medications Last Reviewed: 8/18/2020  Updated Objective Findings: Below measures from initial evaluation unless otherwise noted. 6/5/20  Pain at worst: 7/10  Observation: Incision healing well  ROM:      Left Right   Elevation (°) 141 PROM to 90 per visual estimation   Behind neck reach To TLJ Not tested   Behind back reach To opposite scapula  Not tested    Cross body reach To opposite scapula Not tested   R ER to neutral in adduction. UE ANDT: All within normal limits B  DASH: 45/55    7/8/20:   R elevation AAROM to 114 °, R elevation AROM to 108 °     8/3/20:   R elevation AROM to 115 °     TREATMENT:   THERAPEUTIC ACTIVITY: (see chart for minutes): Therapeutic activities per grid below to improve mobility, strength, balance and coordination. Required minimal visual, verbal and tactile cues to improve independence with functional mobility and activities. THERAPEUTIC EXERCISE: (see chart for minutes):  Exercises per grid below to improve mobility, strength, balance and coordination. Required minimal visual, verbal and tactile cues to promote proper body alignment and promote proper body mechanics. Progressed resistance, range, repetitions and complexity of movement as indicated. NEUROMUSCULAR RE-EDUCATION: (see chart for minutes):  Exercise/activities per grid below to improve balance, coordination, kinesthetic sense, posture, pain, and proprioception. Required minimal visual, verbal and tactile cues to promote motor control of right, upper extremity(s).   MANUAL THERAPY: (see chart for minutes): Joint mobilization, Soft tissue mobilization, skin mobilization, and muscle energy techniques were utilized and necessary because of the patient's restricted joint motion, restricted motion of soft tissue and pain . MODALITIES: (see chart for minutes):      *  Cold Pack Therapy in order to provide analgesia. Vasopneumatic compression with cryotherapy to control pain and swelling s/p R rTSA.     Date: 8/5/20 (visit 15)  8/10/20 (visit 16)  8/12/20 (visit 17)  8/18/20 (viist 18)      Modalities: 15 min 15 min 15 min 10 min     Game ready to R shoulder with medium compression and coldest setting 15'  15'  15' Game ready and bag of ice on R foot 10'      Therapeutic Exercise: 30 min 40 min 40 min 30 min      UBE: 3'/3' L4  Scaption: 2x10x2# B  Abduction: 2x10x2# B  Behind back stretch with strap: x8 holds R  Behind neck stretch with strap: x8 holds R  Cross body stretch: x3 holds   S/L R ER: 2x10x4#  S/L R abduction: 2x10x4#  Prone row, I: 2x10x4# R  Prone Y: 2x10x2# R  Prone T: 2x10 AROM  Supine fly: 10x2# B   PROM: 3' into ER, IR, and flexion UBE: 3'/3' L4  Scaption: 10x2# B  Behind back stretch with strap: x3 holds R  Behind neck stretch with strap: x3 holds R  Cross body stretch: x3 holds   S/L R ER: 2x10x4#  S/L R abduction: 2x10x4#  Band ER: 2x10 red  Band IR: 2x10 red  Band row: 2x10 blue  Band extension: 2x10 blue  D2 flexion: 8s59w49# B  D2 extension: 4d97o32# B  PROM: x5' into ER, IR, and flexion UBE: 3'/3' L4  Scaption: 2x10x2# B  Abduction: 2x10x2# B  Behind back stretch with strap: x3 holds R  Band ER: 2x10 red  Band pull-apart: 2x10 red   Band row: 2v58o14# B  D2 flexion: 10x10#, 10x12.5# B  D2 extension: 10x10#, 10x12.5# B  Supine IR in abduction: 10x2# B with much tightness   PROM: x5' into ER, IR, and flexion UBE: 3'/3' L4  Scaption: 2x10x2# B  Abduction: 2x10x2# B  Behind back stretch with strap: x3 holds R  Behind neck stretch with strap: x3 holds   Band ER: 2x10 red  Band pull-apart: 2x10 red   D2 flexion: 5v33b54# B  D2 extension: 3g78d74# B  Supine IR in abduction: 2x10x2# R     Neuromuscular re-education                  Manual Therapy:                  Therapeutic Activities:                    Home program: 6/5/20: add pulley flexion and isometric (flexion and abduction) to home program  8/5/20: pt given printouts of behind back stretch with strap and cross body stretch    [employer] Portal  Treatment/Session Summary:    · Response to Treatment: Pt tolerated exercises well and was challenged with standing elevation exercises. He continues to be very tight with reaching behind back but reports in general his shoulder is feeling looser. · Communication/Consultation:  None today  · Equipment provided today:  6/5/20: doorway pulley. 8/10/20: red band   · Recommendations/Intent for next treatment session: Next visit will focus on R shoulder ROM, strength.     Total Treatment Billable Duration:  45 minutes  PT Patient Time In/Time Out  Time In: 0845  Time Out: Meli Mitchell 476, PT, DPT    Future Appointments   Date Time Provider Arpit Nails   8/21/2020 10:15 AM Margrette Gilbert [facility]   8/24/2020 10:15 AM Margrette Gilbert [facility]   8/26/2020 11:00 AM Margrette Gilbert [facility]   8/31/2020 10:15 AM Margrette Gilbert [facility]   9/2/2020 10:15 AM Margrette Gilbert [facility]   9/8/2020 10:15 AM Margrette Gilbert [facility]   9/10/2020 10:15 AM Margrette Gilbert [facility]

## 2020-08-19 ENCOUNTER — APPOINTMENT (OUTPATIENT)
Dept: PHYSICAL THERAPY | Age: 75
End: 2020-08-19
Payer: MEDICARE

## 2020-08-21 ENCOUNTER — HOSPITAL ENCOUNTER (OUTPATIENT)
Dept: PHYSICAL THERAPY | Age: 75
Discharge: HOME | End: 2020-08-21
Payer: MEDICARE

## 2020-08-21 PROCEDURE — 97016 VASOPNEUMATIC DEVICE THERAPY: CPT

## 2020-08-21 PROCEDURE — 97110 THERAPEUTIC EXERCISES: CPT

## 2020-08-21 NOTE — PROGRESS NOTES
Marily Starkey  : 1945  [place]  [place] [address] @ [address]  [phone]   [phone]      OUTPATIENT PHYSICAL THERAPY: Daily Treatment Note 2020  Visit Count:  19   DOS: 20    ICD-10: Treatment Diagnosis: Pain in right shoulder (M25.511) and Stiffness of right shoulder, not elsewhere classified (M25.611)  TREATMENT PLAN:  Effective Dates: 2020 TO 9/3/2020 (90 days). Frequency/Duration: 1 time a week for 30 Day(s), 2 times a week for next 60 days  GOALS: (Goals have been discussed and agreed upon with patient.)  Short-Term Functional Goals: Time Frame: 4 weeks  # Goal Status   1 Pt will confirm compliance with home program to facilitate improvement in function. MET     Discharge goals: Time frame: 12 weeks  # Goal Status   1 Pt will be able to elevate UE to at least 115° without symptoms in order to participate in ADLs such as reaching overhead and pulling shirt on.  20: R elevation AAROM to 114 °, R elevation AROM to 108 °   8/3/20: R UE active elevation to 115 °   20: R UE elevation to 120 °  MET   2 Pt will be able to reach over head to touch CTJ without symptoms to be able to participate in ADLs such as reaching overhead and washing head. 8/3/20: pt able to reach to posterior neck UPDATED 20   3 Pt will be able to reach across body to touch spine of opposite scapula without symptoms to be able to participate in ADLs such as reaching tasks. 8/3/20: pt able to touch opposite AC joint ONGOING   4 Pt will be able to reach behind back to touch lower back without symptoms to be able to participate in dressing and toileting activities.   20: lower aspect of R glutes  20: to iliac crest  ONGOING   5 Pt will be able to reach behind themselves with arm elevated without symptoms to be able to participate in ADLs such as reaching tasks and grabbing seat belt. ONGOING     Follow-up: 9/15/20    Pre-treatment Symptoms/Complaints:  Pt reports he continues to do his exercises. He reports he practices reaching overhead on his couch and he stretches his shoulder in all directions after his shower. He reports he has been using 3# weights at home for his exercises. He reports almost all of his reaching ADLs are doing well with no limitation. He reports reaching forward and overhead have gone well. He reports reaching behind his body is tightest.   Pain: Initial:  Pt reported no pain  Post Session:  No pain   Medications Last Reviewed: 8/21/2020  Updated Objective Findings: Below measures from initial evaluation unless otherwise noted. 6/5/20  Pain at worst: 7/10  Observation: Incision healing well  ROM:      Left Right   Elevation (°) 141 PROM to 90 per visual estimation   Behind neck reach To TLJ Not tested   Behind back reach To opposite scapula  Not tested    Cross body reach To opposite scapula Not tested   R ER to neutral in adduction. UE ANDT: All within normal limits B  DASH: 45/55    7/8/20:   R elevation AAROM to 114 °, R elevation AROM to 108 °     8/3/20:   R elevation AROM to 115 °     TREATMENT:   THERAPEUTIC ACTIVITY: (see chart for minutes): Therapeutic activities per grid below to improve mobility, strength, balance and coordination. Required minimal visual, verbal and tactile cues to improve independence with functional mobility and activities. THERAPEUTIC EXERCISE: (see chart for minutes):  Exercises per grid below to improve mobility, strength, balance and coordination. Required minimal visual, verbal and tactile cues to promote proper body alignment and promote proper body mechanics. Progressed resistance, range, repetitions and complexity of movement as indicated.   NEUROMUSCULAR RE-EDUCATION: (see chart for minutes):  Exercise/activities per grid below to improve balance, coordination, kinesthetic sense, posture, pain, and proprioception. Required minimal visual, verbal and tactile cues to promote motor control of right, upper extremity(s). MANUAL THERAPY: (see chart for minutes): Joint mobilization, Soft tissue mobilization, skin mobilization, and muscle energy techniques were utilized and necessary because of the patient's restricted joint motion, restricted motion of soft tissue and pain . MODALITIES: (see chart for minutes):      *  Cold Pack Therapy in order to provide analgesia. Vasopneumatic compression with cryotherapy to control pain and swelling s/p R rTSA.     Date: 8/5/20 (visit 15)  8/10/20 (visit 16)  8/12/20 (visit 17) PN 8/18/20 (viist 18)  8/21/20 (visit 19)     Modalities: 15 min 15 min 15 min 10 min 15 min    Game ready to R shoulder with medium compression and coldest setting 15'  15'  15' Game ready and bag of ice on R foot 10'  15' Game ready to R shoulder     Therapeutic Exercise: 30 min 40 min 40 min 30 min 45 min     UBE: 3'/3' L4  Scaption: 2x10x2# B  Abduction: 2x10x2# B  Behind back stretch with strap: x8 holds R  Behind neck stretch with strap: x8 holds R  Cross body stretch: x3 holds   S/L R ER: 2x10x4#  S/L R abduction: 2x10x4#  Prone row, I: 2x10x4# R  Prone Y: 2x10x2# R  Prone T: 2x10 AROM  Supine fly: 10x2# B   PROM: 3' into ER, IR, and flexion UBE: 3'/3' L4  Scaption: 10x2# B  Behind back stretch with strap: x3 holds R  Behind neck stretch with strap: x3 holds R  Cross body stretch: x3 holds   S/L R ER: 2x10x4#  S/L R abduction: 2x10x4#  Band ER: 2x10 red  Band IR: 2x10 red  Band row: 2x10 blue  Band extension: 2x10 blue  D2 flexion: 4q39x36# B  D2 extension: 5m86b49# B  PROM: x5' into ER, IR, and flexion UBE: 3'/3' L4  Scaption: 2x10x2# B  Abduction: 2x10x2# B  Behind back stretch with strap: x3 holds R  Band ER: 2x10 red  Band pull-apart: 2x10 red   Band row: 1m06y64# B  D2 flexion: 10x10#, 10x12.5# B  D2 extension: 10x10#, 10x12.5# B  Supine IR in abduction: 10x2# B with much tightness PROM: x5' into ER, IR, and flexion UBE: 3'/3' L4  Scaption: 2x10x2# B  Abduction: 2x10x2# B  Behind back stretch with strap: x3 holds R  Behind neck stretch with strap: x3 holds   Band ER: 2x10 red  Band pull-apart: 2x10 red   D2 flexion: 5i20f22# B  D2 extension: 0r34e94# B  Supine IR in abduction: 2x10x2# R UBE: 3'/3' L4  Scaption: 2x10x3# B  Abduction: 2x10x3# B  Behind back stretch with strap: 2x5 holds R  Behind head reach to cross body reach AROM: 2x5  Band ER: 2x10 red  Band pull-apart: 2x10 red  Cable IR: 2x38n41# R    Cable row: 5y05m10# B  D2 flexion: 6v88w55# B  D2 extension: 9h97k85# B  Supine IR in abduction: 2x10x2# B with improved ROM  PROM: x3' into ER and IR    Neuromuscular re-education                  Manual Therapy:                  Therapeutic Activities:                    Home program: 6/5/20: add pulley flexion and isometric (flexion and abduction) to home program  8/5/20: pt given printouts of behind back stretch with strap and cross body stretch    [employer] Portal  Treatment/Session Summary:    · Response to Treatment: Pt did well with increase in intensity and volume today. He demonstrates greatest limitation with reaching behind body such as reaching behind head or behind back but reports he works on it every day and he does demonstrate improvements. · Communication/Consultation:  None today  · Equipment provided today:  6/5/20: doorway pulley. 8/10/20: red band   · Recommendations/Intent for next treatment session: Next visit will focus on R shoulder ROM, strength.     Total Treatment Billable Duration:  60 minutes  PT Patient Time In/Time Out  Time In: 1015  Time Out: [address] [facility] Jeff Rivers, PT, DPT    Future Appointments   Date Time Provider Arpit Nails   8/24/2020 10:15 AM Hillary [facility]   8/26/2020 11:00 AM [employer] [facility]   8/31/2020 10:15 AM Donnald Syndero [facility]   9/2/2020 10:15 AM Donnald [facility]   9/8/2020 10:15 AM Hillary Isbell [facility]   9/10/2020 10:15 AM Orin Ojeda [facility]

## 2020-08-24 ENCOUNTER — HOSPITAL ENCOUNTER (OUTPATIENT)
Dept: PHYSICAL THERAPY | Age: 75
Discharge: HOME | End: 2020-08-24
Payer: MEDICARE

## 2020-08-24 PROCEDURE — 97016 VASOPNEUMATIC DEVICE THERAPY: CPT

## 2020-08-24 PROCEDURE — 97110 THERAPEUTIC EXERCISES: CPT

## 2020-08-24 NOTE — PROGRESS NOTES
Ran Goff  : 1945  [place]  [place]: [place] [address] @ [address]  [phone]   [phone]      OUTPATIENT PHYSICAL THERAPY: Daily Treatment and progress Note 2020  Visit Count:  20   DOS: 20    ICD-10: Treatment Diagnosis: Pain in right shoulder (M25.511) and Stiffness of right shoulder, not elsewhere classified (M25.611)  TREATMENT PLAN:  Effective Dates: 2020 TO 9/3/2020 (90 days). Frequency/Duration: 1 time a week for 30 Day(s), 2 times a week for next 60 days  GOALS: (Goals have been discussed and agreed upon with patient.)  Short-Term Functional Goals: Time Frame: 4 weeks  # Goal Status   1 Pt will confirm compliance with home program to facilitate improvement in function. MET     Discharge goals: Time frame: 12 weeks  # Goal Status   1 Pt will be able to elevate UE to at least 115° without symptoms in order to participate in ADLs such as reaching overhead and pulling shirt on.  20: R elevation AAROM to 114 °, R elevation AROM to 108 °   8/3/20: R UE active elevation to 115 °   20: R UE elevation to 120 °  MET   2 Pt will be able to reach over head to touch CTJ without symptoms to be able to participate in ADLs such as reaching overhead and washing head. 8/3/20: pt able to reach to posterior neck UPDATED 20   3 Pt will be able to reach across body to touch spine of opposite scapula without symptoms to be able to participate in ADLs such as reaching tasks. 8/3/20: pt able to touch opposite AC joint ONGOING   4 Pt will be able to reach behind back to touch lower back without symptoms to be able to participate in dressing and toileting activities.   20: lower aspect of R glutes  20: to iliac crest  ONGOING   5 Pt will be able to reach behind themselves with arm elevated without symptoms to be able to participate in ADLs such as reaching tasks and grabbing seat belt. ONGOING     Follow-up: 9/15/20    Pre-treatment Symptoms/Complaints:  Pt reports no pain, just tightness in R shoulder. He confirms he continues to do all his exercises. He reported he had to leave by 11:05 today. Pain: Initial:  Pt reported no pain  Post Session:  No pain   Medications Last Reviewed: 8/24/2020  Updated Objective Findings: Below measures from initial evaluation unless otherwise noted. 6/5/20  Pain at worst: 7/10  Observation: Incision healing well  ROM:      Left Right   Elevation (°) 141 PROM to 90 per visual estimation   Behind neck reach To TLJ Not tested   Behind back reach To opposite scapula  Not tested    Cross body reach To opposite scapula Not tested   R ER to neutral in adduction. UE ANDT: All within normal limits B  DASH: 45/55    7/8/20:   R elevation AAROM to 114 °, R elevation AROM to 108 °     8/3/20:   R elevation AROM to 115 °     8/24/20:   R UE elevation to 120 °, pt able to reach to iliac crest with R shoulder actively and to low back with overpressure     TREATMENT:   THERAPEUTIC ACTIVITY: (see chart for minutes): Therapeutic activities per grid below to improve mobility, strength, balance and coordination. Required minimal visual, verbal and tactile cues to improve independence with functional mobility and activities. THERAPEUTIC EXERCISE: (see chart for minutes):  Exercises per grid below to improve mobility, strength, balance and coordination. Required minimal visual, verbal and tactile cues to promote proper body alignment and promote proper body mechanics. Progressed resistance, range, repetitions and complexity of movement as indicated. NEUROMUSCULAR RE-EDUCATION: (see chart for minutes):  Exercise/activities per grid below to improve balance, coordination, kinesthetic sense, posture, pain, and proprioception. Required minimal visual, verbal and tactile cues to promote motor control of right, upper extremity(s).   MANUAL THERAPY: (see chart for minutes): Joint mobilization, Soft tissue mobilization, skin mobilization, and muscle energy techniques were utilized and necessary because of the patient's restricted joint motion, restricted motion of soft tissue and pain . MODALITIES: (see chart for minutes):      *  Cold Pack Therapy in order to provide analgesia. Vasopneumatic compression with cryotherapy to control pain and swelling s/p R rTSA.     Date: 8/5/20 (visit 15)  8/10/20 (visit 16)  8/12/20 (visit 17) PN 8/18/20 (viist 18)  8/21/20 (visit 19)  8/24/20 (visit 20) PN   Modalities: 15 min 15 min 15 min 10 min 15 min 10 min   Game ready to R shoulder with medium compression and coldest setting 15'  15'  15' Game ready and bag of ice on R foot 10'  15' Game ready to R shoulder  10'   Therapeutic Exercise: 30 min 40 min 40 min 30 min 45 min 43 min    UBE: 3'/3' L4  Scaption: 2x10x2# B  Abduction: 2x10x2# B  Behind back stretch with strap: x8 holds R  Behind neck stretch with strap: x8 holds R  Cross body stretch: x3 holds   S/L R ER: 2x10x4#  S/L R abduction: 2x10x4#  Prone row, I: 2x10x4# R  Prone Y: 2x10x2# R  Prone T: 2x10 AROM  Supine fly: 10x2# B   PROM: 3' into ER, IR, and flexion UBE: 3'/3' L4  Scaption: 10x2# B  Behind back stretch with strap: x3 holds R  Behind neck stretch with strap: x3 holds R  Cross body stretch: x3 holds   S/L R ER: 2x10x4#  S/L R abduction: 2x10x4#  Band ER: 2x10 red  Band IR: 2x10 red  Band row: 2x10 blue  Band extension: 2x10 blue  D2 flexion: 6c87f59# B  D2 extension: 3b90y18# B  PROM: x5' into ER, IR, and flexion UBE: 3'/3' L4  Scaption: 2x10x2# B  Abduction: 2x10x2# B  Behind back stretch with strap: x3 holds R  Band ER: 2x10 red  Band pull-apart: 2x10 red   Band row: 8y51n65# B  D2 flexion: 10x10#, 10x12.5# B  D2 extension: 10x10#, 10x12.5# B  Supine IR in abduction: 10x2# B with much tightness   PROM: x5' into ER, IR, and flexion UBE: 3'/3' L4  Scaption: 2x10x2# B  Abduction: 2x10x2# B  Behind back stretch with strap: x3 holds R  Behind neck stretch with strap: x3 holds   Band ER: 2x10 red  Band pull-apart: 2x10 red   D2 flexion: 0l45v53# B  D2 extension: 5a07p82# B  Supine IR in abduction: 2x10x2# R UBE: 3'/3' L4  Scaption: 2x10x3# B  Abduction: 2x10x3# B  Behind back stretch with strap: 2x5 holds R  Behind head reach to cross body reach AROM: 2x5  Band ER: 2x10 red  Band pull-apart: 2x10 red  Cable IR: 2z01c93# R    Cable row: 8u43u52# B  D2 flexion: 3q21v49# B  D2 extension: 3b93y08# B  Supine IR in abduction: 2x10x2# B with improved ROM  PROM: x3' into ER and IR UBE: 3'/3' L4  Scaption: 2x8x4# B  Abduction: 2x8x4# B  Behind back stretch with strap: 2x5 holds R  Behind head reach to cross body reach AROM: 2x5  Band ER: 2x10 red  Band pull-apart: 2x10 red  Cable IR: 1r91q49# R    Cable row: 6m29f02# B  D2 flexion: 1g72y42# B  D2 extension: 3v17n24# B  PROM: x3' into ER and IR   Neuromuscular re-education                  Manual Therapy:                  Therapeutic Activities:                    Home program: 6/5/20: add pulley flexion and isometric (flexion and abduction) to home program  8/5/20: pt given printouts of behind back stretch with strap and cross body stretch    [employer] Portal  Treatment/Session Summary:    · Response to Treatment: Pt demonstrates improvement in R shoulder AROM, strength, activity tolerance, and functional use. He still has decreased ROM especially with reaching behind back. He will benefit from physical therapy to continue to improve ROM and strength. · Communication/Consultation:  None today  · Equipment provided today:  6/5/20: doorway pulley. 8/10/20: red band   · Recommendations/Intent for next treatment session: Next visit will focus on R shoulder ROM, strength.     Total Treatment Billable Duration:  53 minutes  PT Patient Time In/Time Out  Time In: 0590  Time Out: [facility], PT, DPT    Future Appointments   Date Time Provider Arpit Nails   8/26/2020 11:00 AM Eladia Shah [facility]   8/31/2020 10:15 AM Eladia [facility]   9/2/2020 10:15 AM Eladia [facility]   9/8/2020 10:15 AM Eladia [facility]   9/10/2020 10:15 AM Eladia [facility]

## 2020-08-26 ENCOUNTER — HOSPITAL ENCOUNTER (OUTPATIENT)
Dept: PHYSICAL THERAPY | Age: 75
Discharge: HOME | End: 2020-08-26
Payer: MEDICARE

## 2020-08-31 ENCOUNTER — HOSPITAL ENCOUNTER (OUTPATIENT)
Dept: PHYSICAL THERAPY | Age: 75
Discharge: HOME | End: 2020-08-31
Payer: MEDICARE

## 2020-08-31 PROCEDURE — 97016 VASOPNEUMATIC DEVICE THERAPY: CPT

## 2020-08-31 PROCEDURE — 97110 THERAPEUTIC EXERCISES: CPT

## 2020-08-31 NOTE — PROGRESS NOTES
Mayra Bailon  : 1945  [place]  [place] [address] @ [address], [place].  [phone]   [phone]      OUTPATIENT PHYSICAL THERAPY: Daily Treatment Note 2020  Visit Count:  21   DOS: 20    ICD-10: Treatment Diagnosis: Pain in right shoulder (M25.511) and Stiffness of right shoulder, not elsewhere classified (M25.611)  TREATMENT PLAN:  Effective Dates: 2020 TO 9/3/2020 (90 days). Frequency/Duration: 1 time a week for 30 Day(s), 2 times a week for next 60 days  GOALS: (Goals have been discussed and agreed upon with patient.)  Short-Term Functional Goals: Time Frame: 4 weeks  # Goal Status   1 Pt will confirm compliance with home program to facilitate improvement in function. MET     Discharge goals: Time frame: 12 weeks  # Goal Status   1 Pt will be able to elevate UE to at least 115° without symptoms in order to participate in ADLs such as reaching overhead and pulling shirt on.  20: R elevation AAROM to 114 °, R elevation AROM to 108 °   8/3/20: R UE active elevation to 115 °   20: R UE elevation to 120 °  MET   2 Pt will be able to reach over head to touch CTJ without symptoms to be able to participate in ADLs such as reaching overhead and washing head. 8/3/20: pt able to reach to posterior neck UPDATED 20   3 Pt will be able to reach across body to touch spine of opposite scapula without symptoms to be able to participate in ADLs such as reaching tasks. 8/3/20: pt able to touch opposite AC joint ONGOING   4 Pt will be able to reach behind back to touch lower back without symptoms to be able to participate in dressing and toileting activities.   20: lower aspect of R glutes  20: to iliac crest  ONGOING   5 Pt will be able to reach behind themselves with arm elevated without symptoms to be able to participate in ADLs such as reaching tasks and grabbing seat belt. ONGOING     Follow-up: 9/15/20    Pre-treatment Symptoms/Complaints:  Pt reports he has continued to work on his exercises and use his R shoulder. He has to leave by 1100 today. Pain: Initial:  Pt reported no pain  Post Session:  No pain   Medications Last Reviewed: 8/31/2020  Updated Objective Findings: Below measures from initial evaluation unless otherwise noted. 6/5/20  Pain at worst: 7/10  Observation: Incision healing well  ROM:      Left Right   Elevation (°) 141 PROM to 90 per visual estimation   Behind neck reach To TLJ Not tested   Behind back reach To opposite scapula  Not tested    Cross body reach To opposite scapula Not tested   R ER to neutral in adduction. UE ANDT: All within normal limits B  DASH: 45/55    7/8/20:   R elevation AAROM to 114 °, R elevation AROM to 108 °     8/3/20:   R elevation AROM to 115 °     8/24/20:   R UE elevation to 120 °, pt able to reach to iliac crest with R shoulder actively and to low back with overpressure     TREATMENT:   THERAPEUTIC ACTIVITY: (see chart for minutes): Therapeutic activities per grid below to improve mobility, strength, balance and coordination. Required minimal visual, verbal and tactile cues to improve independence with functional mobility and activities. THERAPEUTIC EXERCISE: (see chart for minutes):  Exercises per grid below to improve mobility, strength, balance and coordination. Required minimal visual, verbal and tactile cues to promote proper body alignment and promote proper body mechanics. Progressed resistance, range, repetitions and complexity of movement as indicated. NEUROMUSCULAR RE-EDUCATION: (see chart for minutes):  Exercise/activities per grid below to improve balance, coordination, kinesthetic sense, posture, pain, and proprioception. Required minimal visual, verbal and tactile cues to promote motor control of right, upper extremity(s).   MANUAL THERAPY: (see chart for minutes): Joint mobilization, Soft tissue mobilization, skin mobilization, and muscle energy techniques were utilized and necessary because of the patient's restricted joint motion, restricted motion of soft tissue and pain . MODALITIES: (see chart for minutes):      *  Cold Pack Therapy in order to provide analgesia. Vasopneumatic compression with cryotherapy to control pain and swelling s/p R rTSA. Date: 8/24/20 (visit 20) PN 8/31/20 (visit 21)     Modalities: 10 min 10 min    Game ready to R shoulder with medium compression and coldest setting 10' 10'    Therapeutic Exercise: 43 min 35 min     UBE: 3'/3' L4  Scaption: 2x8x4# B  Abduction: 2x8x4# B  Behind back stretch with strap: 2x5 holds R  Behind head reach to cross body reach AROM: 2x5  Band ER: 2x10 red  Band pull-apart: 2x10 red  Cable IR: 4y66f81# R    Cable row: 3b45d56# B  D2 flexion: 9e86t37# B  D2 extension: 4s26d47# B  PROM: x3' into ER and IR UBE: 3'/3' L4  Scaption: 2x10x4# B  Abduction: 2x10x4# B  Behind back stretch with strap: 2x5 holds R  Behind head reach to cross body reach AROM: 2x5  Band ER: 2x10 red  Band pull-apart: 2x10 red  Cable IR: 3l33c45# R    Cable row: 3s54p22# B  D2 flexion: 1r25g58# B  D2 extension: 4t44e41# B    Neuromuscular re-education            Manual Therapy:            Therapeutic Activities:              Home program: 6/5/20: add pulley flexion and isometric (flexion and abduction) to home program  8/5/20: pt given printouts of behind back stretch with strap and cross body stretch    [employer] Portal  Treatment/Session Summary:    · Response to Treatment: Pt tolerated exercises well and demonstrates improved ROM reaching behind back. · Communication/Consultation:  None today  · Equipment provided today:  6/5/20: doorway pulley. 8/10/20: red band   · Recommendations/Intent for next treatment session: Next visit will focus on R shoulder ROM, strength.     Total Treatment Billable Duration:  45 minutes  PT Patient Time In/Time Out  Time In: 1013  Time Out: Shayan, PT, DPT    Future Appointments   Date Time Provider Arpit Nails   9/2/2020 10:15 AM Pavel Joshi [facility]   9/8/2020 10:15 AM Pavel [facility]   9/10/2020 10:15 AM Pavel [facility]

## 2020-09-02 ENCOUNTER — HOSPITAL ENCOUNTER (OUTPATIENT)
Dept: PHYSICAL THERAPY | Age: 75
Discharge: HOME | End: 2020-09-02
Payer: MEDICARE

## 2020-09-02 PROCEDURE — 97016 VASOPNEUMATIC DEVICE THERAPY: CPT

## 2020-09-02 PROCEDURE — 97110 THERAPEUTIC EXERCISES: CPT

## 2020-09-02 NOTE — PROGRESS NOTES
Marily Starkey  : 1945  [place]  [place]: [place] [address] @ [address]  [address] [address].  [phone]   [phone]      OUTPATIENT PHYSICAL THERAPY: Daily Treatment and progress Note 2020  Visit Count:  22   DOS: 20    ICD-10: Treatment Diagnosis: Pain in right shoulder (M25.511) and Stiffness of right shoulder, not elsewhere classified (M25.611)  TREATMENT PLAN:  Effective Dates: 2020 TO 9/3/2020 (90 days). Frequency/Duration: 1 time a week for 30 Day(s), 2 times a week for next 60 days  GOALS: (Goals have been discussed and agreed upon with patient.)  Short-Term Functional Goals: Time Frame: 4 weeks  # Goal Status   1 Pt will confirm compliance with home program to facilitate improvement in function. MET     Discharge goals: Time frame: 12 weeks  # Goal Status   1 Pt will be able to elevate UE to at least 115° without symptoms in order to participate in ADLs such as reaching overhead and pulling shirt on.  20: R elevation AAROM to 114 °, R elevation AROM to 108 °   8/3/20: R UE active elevation to 115 °   20: R UE elevation to 120 °  MET   2 Pt will be able to reach over head to touch CTJ without symptoms to be able to participate in ADLs such as reaching overhead and washing head. 8/3/20: pt able to reach to posterior neck UPDATED 20  MET   3 Pt will be able to reach across body to touch spine of opposite scapula without symptoms to be able to participate in ADLs such as reaching tasks. 8/3/20: pt able to touch opposite AC joint MET   4 Pt will be able to reach behind back to touch lower back without symptoms to be able to participate in dressing and toileting activities.   20: lower aspect of R glutes  20: to iliac crest   20: pt able to teach top of iliac crest PARTIALLY MET   5 Pt will be able to reach behind themselves with arm elevated without symptoms to be able to participate in ADLs such as reaching tasks and grabbing seat belt. MET     Follow-up: 9/15/20    Pre-treatment Symptoms/Complaints:  Pt reports he has been doing well and he mowed his lawn yesterday without problems. He reports he also was able to use the weed eater. He reports he has to leave by 1100 today. He reports he is able to do all of his ADLs. Pain: Initial:  Pt reported no pain  Post Session:  No pain   Medications Last Reviewed: 9/2/2020  Updated Objective Findings: Below measures from initial evaluation unless otherwise noted. 6/5/20  Pain at worst: 7/10  Observation: Incision healing well  ROM:      Left Right   Elevation (°) 141 PROM to 90 per visual estimation   Behind neck reach To TLJ Not tested   Behind back reach To opposite scapula  Not tested    Cross body reach To opposite scapula Not tested   R ER to neutral in adduction. UE ANDT: All within normal limits B  DASH: 45/55    7/8/20:   R elevation AAROM to 114 °, R elevation AROM to 108 °     8/3/20:   R elevation AROM to 115 °     8/24/20:   R UE elevation to 120 °, pt able to reach to iliac crest with R shoulder actively and to low back with overpressure     9/2/20:   Pt able to reach to touch CTJ, across his body to opposite scapula, and behind back to top of iliac crest.  DASH: 15/55    TREATMENT:   THERAPEUTIC ACTIVITY: (see chart for minutes): Therapeutic activities per grid below to improve mobility, strength, balance and coordination. Required minimal visual, verbal and tactile cues to improve independence with functional mobility and activities. THERAPEUTIC EXERCISE: (see chart for minutes):  Exercises per grid below to improve mobility, strength, balance and coordination. Required minimal visual, verbal and tactile cues to promote proper body alignment and promote proper body mechanics. Progressed resistance, range, repetitions and complexity of movement as indicated.   NEUROMUSCULAR RE-EDUCATION: (see chart for minutes):  Exercise/activities per grid below to improve balance, coordination, kinesthetic sense, posture, pain, and proprioception. Required minimal visual, verbal and tactile cues to promote motor control of right, upper extremity(s). MANUAL THERAPY: (see chart for minutes): Joint mobilization, Soft tissue mobilization, skin mobilization, and muscle energy techniques were utilized and necessary because of the patient's restricted joint motion, restricted motion of soft tissue and pain . MODALITIES: (see chart for minutes):      *  Cold Pack Therapy in order to provide analgesia. Vasopneumatic compression with cryotherapy to control pain and swelling s/p R rTSA. Date: 8/24/20 (visit 20) PN 8/31/20 (visit 21)  9/2/20 (visit 22)    Modalities: 10 min 10 min 10 min   Game ready to R shoulder with medium compression and coldest setting 10' 10' 10'    Therapeutic Exercise: 43 min 35 min 35 min    UBE: 3'/3' L4  Scaption: 2x8x4# B  Abduction: 2x8x4# B  Behind back stretch with strap: 2x5 holds R  Behind head reach to cross body reach AROM: 2x5  Band ER: 2x10 red  Band pull-apart: 2x10 red  Cable IR: 6z37a67# R    Cable row: 2n62a88# B  D2 flexion: 5u54u39# B  D2 extension: 3g84v82# B  PROM: x3' into ER and IR UBE: 3'/3' L4  Scaption: 2x10x4# B  Abduction: 2x10x4# B  Behind back stretch with strap: 2x5 holds R  Behind head reach to cross body reach AROM: 2x5  Band ER: 2x10 red  Band pull-apart: 2x10 red  Cable IR: 6u96f35# R    Cable row: 8u86q91# B  D2 flexion: 3q68u20# B  D2 extension: 8u74h94# B UBE: 3'/3' L4  Pt practiced taking swings with golf club at 50-60% strength. He reported it felt good and no pain, just mild stretch.   Scaption: 2x10x4# B  Abduction: 2x10x4# B  Behind back stretch with strap: 2x5 holds R  Behind head reach to cross body reach AROM: 2x5  Band ER: 2x10 red  Band pull-apart: 2x10 red  Cable IR: 4h40f01# R    Cable row: 0w57q99# B  D2 flexion: 9r09m76# B  D2 extension: 8c10w14# B  Pt given printout of home program.    Neuromuscular re-education            Manual Therapy:            Therapeutic Activities:              Home program: 6/5/20: add pulley flexion and isometric (flexion and abduction) to home program  8/5/20: pt given printouts of behind back stretch with strap and cross body stretch  9/2/20: printout for band ER, band pull apart, band IR, supine IR/ER arc, standing flexion, standing abduction, behind back stretch with towel    [employer] Portal  Treatment/Session Summary:    · Response to Treatment: Pt demonstrates significant improvement in shoulder strength, ROM, and functional use. He continues to have tightness reaching behind back but has ROM WFL. He will see surgeon for follow-up on 9/15/20 and will be on hold until then, to continue his home program independently. If surgeon confirms he has continued to progress to his satisfaction, the pt will be discharged to home program.  · Communication/Consultation:  None today  · Equipment provided today:  6/5/20: doorway pulley. 8/10/20: red band   · Recommendations/Intent for next treatment session: pt to perform home program independently to continue improving ROM and strength.     Total Treatment Billable Duration:  45 minutes  PT Patient Time In/Time Out  Time In: 1015  Time Out: 201 [employer] Dana Oliver, PT, DPT

## 2020-09-08 ENCOUNTER — APPOINTMENT (OUTPATIENT)
Dept: PHYSICAL THERAPY | Age: 75
End: 2020-09-08
Payer: MEDICARE

## 2020-09-10 ENCOUNTER — APPOINTMENT (OUTPATIENT)
Dept: PHYSICAL THERAPY | Age: 75
End: 2020-09-10
Payer: MEDICARE

## 2020-09-23 NOTE — PROGRESS NOTES
Madhav Arvizu  : 1945  [place]  [place]: [place] [address]  [address]  [phone]   [phone]      OUTPATIENT PHYSICAL THERAPY: Discharge Note  DOS: 20    ICD-10: Treatment Diagnosis: Pain in right shoulder (M25.511) and Stiffness of right shoulder, not elsewhere classified (M25.611)  TREATMENT PLAN:  Effective Dates: 2020 TO 9/3/2020 (90 days). Frequency/Duration: 1 time a week for 30 Day(s), 2 times a week for next 60 days  GOALS: (Goals have been discussed and agreed upon with patient.)  Short-Term Functional Goals: Time Frame: 4 weeks  # Goal Status   1 Pt will confirm compliance with home program to facilitate improvement in function. MET     Discharge goals: Time frame: 12 weeks  # Goal Status   1 Pt will be able to elevate UE to at least 115° without symptoms in order to participate in ADLs such as reaching overhead and pulling shirt on.  20: R elevation AAROM to 114 °, R elevation AROM to 108 °   8/3/20: R UE active elevation to 115 °   20: R UE elevation to 120 °  MET   2 Pt will be able to reach over head to touch CTJ without symptoms to be able to participate in ADLs such as reaching overhead and washing head. 8/3/20: pt able to reach to posterior neck UPDATED 20  MET   3 Pt will be able to reach across body to touch spine of opposite scapula without symptoms to be able to participate in ADLs such as reaching tasks. 8/3/20: pt able to touch opposite AC joint MET   4 Pt will be able to reach behind back to touch lower back without symptoms to be able to participate in dressing and toileting activities.   20: lower aspect of R glutes  20: to iliac crest   20: pt able to teach top of iliac crest PARTIALLY MET   5 Pt will be able to reach behind themselves with arm elevated without symptoms to be able to participate in ADLs such as reaching tasks and grabbing seat belt. MET     · Pt called the clinic and reported that he saw MD last week who reported he is doing well. When he was last seen, he had reported no functional limitations and was ready for discharge to home program. Pt being fully discharged.        Nir Muse, PT, DPT

## 2020-10-27 ENCOUNTER — APPOINTMENT (RX ONLY)
Dept: URBAN - METROPOLITAN AREA CLINIC 349 | Facility: CLINIC | Age: 75
Setting detail: DERMATOLOGY
End: 2020-10-27

## 2020-10-27 DIAGNOSIS — L57.0 ACTINIC KERATOSIS: ICD-10-CM

## 2020-10-27 DIAGNOSIS — D22 MELANOCYTIC NEVI: ICD-10-CM | Status: STABLE

## 2020-10-27 PROBLEM — C44.319 BASAL CELL CARCINOMA OF SKIN OF OTHER PARTS OF FACE: Status: ACTIVE | Noted: 2020-10-27

## 2020-10-27 PROBLEM — D22.5 MELANOCYTIC NEVI OF TRUNK: Status: ACTIVE | Noted: 2020-10-27

## 2020-10-27 PROBLEM — D48.5 NEOPLASM OF UNCERTAIN BEHAVIOR OF SKIN: Status: ACTIVE | Noted: 2020-10-27

## 2020-10-27 PROCEDURE — 88305 TISSUE EXAM BY PATHOLOGIST: CPT

## 2020-10-27 PROCEDURE — ? BODY PHOTOGRAPHY

## 2020-10-27 PROCEDURE — ? LIQUID NITROGEN

## 2020-10-27 PROCEDURE — ? OTHER

## 2020-10-27 PROCEDURE — 99214 OFFICE O/P EST MOD 30 MIN: CPT | Mod: 25

## 2020-10-27 PROCEDURE — ? COUNSELING

## 2020-10-27 PROCEDURE — ? PATHOLOGY BILLING

## 2020-10-27 PROCEDURE — ? BIOPSY BY SHAVE METHOD

## 2020-10-27 PROCEDURE — 17003 DESTRUCT PREMALG LES 2-14: CPT

## 2020-10-27 PROCEDURE — 17000 DESTRUCT PREMALG LESION: CPT | Mod: 59

## 2020-10-27 PROCEDURE — A4550 SURGICAL TRAYS: HCPCS

## 2020-10-27 PROCEDURE — 11102 TANGNTL BX SKIN SINGLE LES: CPT

## 2020-10-27 ASSESSMENT — LOCATION SIMPLE DESCRIPTION DERM
LOCATION SIMPLE: RIGHT UPPER BACK
LOCATION SIMPLE: LEFT SCALP
LOCATION SIMPLE: SCALP
LOCATION SIMPLE: NECK
LOCATION SIMPLE: RIGHT EAR
LOCATION SIMPLE: RIGHT FOREHEAD
LOCATION SIMPLE: LEFT EAR
LOCATION SIMPLE: RIGHT TEMPLE
LOCATION SIMPLE: ABDOMEN

## 2020-10-27 ASSESSMENT — LOCATION DETAILED DESCRIPTION DERM
LOCATION DETAILED: LEFT SUPERIOR PARIETAL SCALP
LOCATION DETAILED: LEFT CENTRAL FRONTAL SCALP
LOCATION DETAILED: RIGHT INFERIOR MEDIAL UPPER BACK
LOCATION DETAILED: XIPHOID
LOCATION DETAILED: RIGHT CENTRAL TEMPLE
LOCATION DETAILED: RIGHT FOREHEAD
LOCATION DETAILED: RIGHT INFERIOR FOREHEAD
LOCATION DETAILED: RIGHT CENTRAL LATERAL NECK
LOCATION DETAILED: RIGHT SUPERIOR MEDIAL UPPER BACK
LOCATION DETAILED: LEFT SUPERIOR HELIX
LOCATION DETAILED: RIGHT SUPERIOR HELIX

## 2020-10-27 ASSESSMENT — LOCATION ZONE DERM
LOCATION ZONE: SCALP
LOCATION ZONE: FACE
LOCATION ZONE: TRUNK
LOCATION ZONE: NECK
LOCATION ZONE: EAR

## 2020-10-27 ASSESSMENT — PAIN INTENSITY VAS: HOW INTENSE IS YOUR PAIN 0 BEING NO PAIN, 10 BEING THE MOST SEVERE PAIN POSSIBLE?: 1/10 PAIN

## 2020-10-27 NOTE — PROCEDURE: PATHOLOGY BILLING
Immunohistochemistry ([id number] and [id number]) billing is not performed here. Please use the Immunohistochemistry Stain Billing plan to accomplish this. Immunohistochemistry ([id number] and [id number]) billing is not performed here. Please use the Immunohistochemistry Stain Billing plan to accomplish this.

## 2020-10-27 NOTE — PROCEDURE: OTHER
Detail Level: Detailed
Note Text (......Xxx Chief Complaint.): This diagnosis correlates with the
Other (Free Text): Discussed prescribing Efudex in the future.

## 2020-10-27 NOTE — PROCEDURE: BIOPSY BY SHAVE METHOD
Render Path Notes In Note?: Yes
Wound Care: Vaseline
Additional Anesthesia Volume In Cc (Will Not Render If 0): 1.5
Type Of Destruction Used: Curettage
Anesthesia Type: 1% lidocaine with 1:100,000 epinephrine and a 1:10 solution of 8.4% sodium bicarbonate
Biopsy Type: H and E
Validate That Anesthesia Is Not 0: No
Accession #: [id number]
Electrodesiccation Text: The wound bed was treated with electrodesiccation after the biopsy was performed.
Consent: Written consent was obtained and risks were reviewed including but not limited to scarring, infection, bleeding, scabbing, incomplete removal, nerve damage and allergy to anesthesia.
X Size Of Lesion In Cm: 0
Hemostasis: Aluminum Chloride
Cryotherapy Text: The wound bed was treated with cryotherapy after the biopsy was performed.
Depth Of Biopsy: dermis
Silver Nitrate Text: The wound bed was treated with silver nitrate after the biopsy was performed.
Notification Instructions: Patient will be notified of biopsy results. However, patient instructed to call the office if not contacted within 2 weeks. After the procedure, the patient was oriented to person, place, and time. Patient denied feeling dizzy, queasy, and and declined further observation after initial 5 minute observation time.
Information: Selecting Yes will display possible errors in your note based on the variables you have selected. This validation is only offered as a suggestion for you. PLEASE NOTE THAT THE VALIDATION TEXT WILL BE REMOVED WHEN YOU FINALIZE YOUR NOTE. IF YOU WANT TO FAX A PRELIMINARY NOTE YOU WILL NEED TO TOGGLE THIS TO 'NO' IF YOU DO NOT WANT IT IN YOUR FAXED NOTE.
Dressing: bandage
Curettage Text: The wound bed was treated with curettage after the biopsy was performed.
Anesthesia Volume In Cc (Will Not Render If 0): 1
Billing Type: Third-Party Bill
Electrodesiccation And Curettage Text: The wound bed was treated with electrodesiccation and curettage after the biopsy was performed.
Post-Care Instructions: I reviewed with the patient in detail post-care instructions. Patient is to keep the biopsy site dry overnight, and then apply Vaseline  daily until healed. Patient may apply hydrogen peroxide soaks to remove any crusting. After the procedure, the patient was oriented to person, place, and time. Patient denied feeling dizzy, queasy, and and declined further observation after initial 5 minute observation time.
Detail Level: Detailed
Biopsy Method: Personna blade

## 2020-10-27 NOTE — PROCEDURE: BODY PHOTOGRAPHY
Was The Entire Body Photographed (Cannot Bill Unless Entire Body Photographed)?: No
Reason For Photography: The patient is obtaining body photography to observe existing suspicious moles and or monitor for the appearance of any new lesions.
Whole Body Statement: The whole body was photographed today.
Consent: Written consent obtained, risks reviewed for whole body photography. Patient understands that photograph costs may not be covered by insurance, and patient is ultimately responsible for payment.
Detail Level: Simple
Number Of Photographs (Optional- Will Not Render If 0): 1

## 2021-05-14 ENCOUNTER — APPOINTMENT (RX ONLY)
Dept: URBAN - METROPOLITAN AREA CLINIC 349 | Facility: CLINIC | Age: 76
Setting detail: DERMATOLOGY
End: 2021-05-14

## 2021-05-14 DIAGNOSIS — L57.0 ACTINIC KERATOSIS: ICD-10-CM

## 2021-05-14 DIAGNOSIS — Z85.828 PERSONAL HISTORY OF OTHER MALIGNANT NEOPLASM OF SKIN: ICD-10-CM

## 2021-05-14 PROCEDURE — ? LIQUID NITROGEN

## 2021-05-14 PROCEDURE — ? COUNSELING

## 2021-05-14 PROCEDURE — ? SEPARATE AND IDENTIFIABLE DOCUMENTATION

## 2021-05-14 PROCEDURE — 99214 OFFICE O/P EST MOD 30 MIN: CPT | Mod: 25

## 2021-05-14 PROCEDURE — 17000 DESTRUCT PREMALG LESION: CPT

## 2021-05-14 PROCEDURE — 17003 DESTRUCT PREMALG LES 2-14: CPT

## 2021-05-14 PROCEDURE — ? PRESCRIPTION

## 2021-05-14 RX ORDER — FLUOROURACIL 5 MG/G
CREAM TOPICAL TWICE DAILY
Qty: 1 | Refills: 0 | Status: ERX | COMMUNITY
Start: 2021-05-14

## 2021-05-14 RX ADMIN — FLUOROURACIL: 5 CREAM TOPICAL at 00:00

## 2021-05-14 ASSESSMENT — LOCATION DETAILED DESCRIPTION DERM
LOCATION DETAILED: LEFT SUPERIOR PARIETAL SCALP
LOCATION DETAILED: LEFT INFERIOR HELIX
LOCATION DETAILED: RIGHT INFERIOR LATERAL FOREHEAD
LOCATION DETAILED: RIGHT INFERIOR HELIX
LOCATION DETAILED: LEFT FOREHEAD
LOCATION DETAILED: RIGHT CENTRAL PARIETAL SCALP
LOCATION DETAILED: RIGHT MEDIAL FRONTAL SCALP
LOCATION DETAILED: LEFT CENTRAL PARIETAL SCALP
LOCATION DETAILED: RIGHT SUPERIOR HELIX

## 2021-05-14 ASSESSMENT — PAIN INTENSITY VAS: HOW INTENSE IS YOUR PAIN 0 BEING NO PAIN, 10 BEING THE MOST SEVERE PAIN POSSIBLE?: 1/10 PAIN

## 2021-05-14 ASSESSMENT — LOCATION SIMPLE DESCRIPTION DERM
LOCATION SIMPLE: SCALP
LOCATION SIMPLE: RIGHT EAR
LOCATION SIMPLE: RIGHT SCALP
LOCATION SIMPLE: LEFT EAR
LOCATION SIMPLE: LEFT FOREHEAD
LOCATION SIMPLE: RIGHT FOREHEAD

## 2021-05-14 ASSESSMENT — LOCATION ZONE DERM
LOCATION ZONE: EAR
LOCATION ZONE: FACE
LOCATION ZONE: SCALP

## 2021-11-02 ENCOUNTER — APPOINTMENT (RX ONLY)
Dept: URBAN - METROPOLITAN AREA CLINIC 349 | Facility: CLINIC | Age: 76
Setting detail: DERMATOLOGY
End: 2021-11-02

## 2021-11-02 DIAGNOSIS — L57.0 ACTINIC KERATOSIS: ICD-10-CM

## 2021-11-02 DIAGNOSIS — L57.8 OTHER SKIN CHANGES DUE TO CHRONIC EXPOSURE TO NONIONIZING RADIATION: ICD-10-CM

## 2021-11-02 DIAGNOSIS — D22 MELANOCYTIC NEVI: ICD-10-CM | Status: STABLE

## 2021-11-02 DIAGNOSIS — L82.1 OTHER SEBORRHEIC KERATOSIS: ICD-10-CM

## 2021-11-02 PROBLEM — D22.5 MELANOCYTIC NEVI OF TRUNK: Status: ACTIVE | Noted: 2021-11-02

## 2021-11-02 PROCEDURE — 99213 OFFICE O/P EST LOW 20 MIN: CPT | Mod: 25

## 2021-11-02 PROCEDURE — ? PRESCRIPTION MEDICATION MANAGEMENT

## 2021-11-02 PROCEDURE — ? LIQUID NITROGEN

## 2021-11-02 PROCEDURE — ? SEPARATE AND IDENTIFIABLE DOCUMENTATION

## 2021-11-02 PROCEDURE — ? SUNSCREEN RECOMMENDATIONS

## 2021-11-02 PROCEDURE — ? MEDICAL PHOTOGRAPHY REVIEW

## 2021-11-02 PROCEDURE — ? COUNSELING

## 2021-11-02 PROCEDURE — 17003 DESTRUCT PREMALG LES 2-14: CPT

## 2021-11-02 PROCEDURE — 17000 DESTRUCT PREMALG LESION: CPT

## 2021-11-02 ASSESSMENT — LOCATION SIMPLE DESCRIPTION DERM
LOCATION SIMPLE: LEFT EAR
LOCATION SIMPLE: POSTERIOR SCALP
LOCATION SIMPLE: ABDOMEN
LOCATION SIMPLE: RIGHT TEMPLE
LOCATION SIMPLE: RIGHT UPPER BACK
LOCATION SIMPLE: RIGHT EAR
LOCATION SIMPLE: LEFT PRETIBIAL REGION

## 2021-11-02 ASSESSMENT — LOCATION ZONE DERM
LOCATION ZONE: FACE
LOCATION ZONE: TRUNK
LOCATION ZONE: EAR
LOCATION ZONE: SCALP
LOCATION ZONE: LEG

## 2021-11-02 ASSESSMENT — LOCATION DETAILED DESCRIPTION DERM
LOCATION DETAILED: RIGHT CENTRAL TEMPLE
LOCATION DETAILED: LEFT SUPERIOR HELIX
LOCATION DETAILED: RIGHT SUPERIOR MEDIAL UPPER BACK
LOCATION DETAILED: POSTERIOR MID-PARIETAL SCALP
LOCATION DETAILED: RIGHT ANTIHELIX
LOCATION DETAILED: XIPHOID
LOCATION DETAILED: LEFT PROXIMAL PRETIBIAL REGION

## 2021-11-02 ASSESSMENT — PAIN INTENSITY VAS: HOW INTENSE IS YOUR PAIN 0 BEING NO PAIN, 10 BEING THE MOST SEVERE PAIN POSSIBLE?: 1/10 PAIN

## 2021-11-02 NOTE — PROCEDURE: LIQUID NITROGEN
Render Post-Care Instructions In Note?: no
Duration Of Freeze Thaw-Cycle (Seconds): 3
Show Aperture Variable?: Yes
Detail Level: Detailed
Consent: The patient's consent was obtained including but not limited to risks of crusting, scabbing, blistering, scarring, darker or lighter pigmentary change, recurrence, incomplete removal and infection.
Post-Care Instructions: I reviewed with the patient in detail post-care instructions. Patient is to wear sunprotection, and avoid picking at any of the treated lesions. Pt may apply Vaseline to crusted or scabbing areas.
Number Of Freeze-Thaw Cycles: 2 freeze-thaw cycles

## 2021-11-02 NOTE — PROCEDURE: PRESCRIPTION MEDICATION MANAGEMENT
Detail Level: Zone
Render In Strict Bullet Format?: No
Plan: 5-fluorouracil cream, patient does not need refills at this time. Patient will start this fall/winter.

## 2022-11-03 ENCOUNTER — APPOINTMENT (RX ONLY)
Dept: URBAN - METROPOLITAN AREA CLINIC 330 | Facility: CLINIC | Age: 77
Setting detail: DERMATOLOGY
End: 2022-11-03

## 2022-11-03 DIAGNOSIS — L57.8 OTHER SKIN CHANGES DUE TO CHRONIC EXPOSURE TO NONIONIZING RADIATION: ICD-10-CM

## 2022-11-03 DIAGNOSIS — D22 MELANOCYTIC NEVI: ICD-10-CM | Status: STABLE

## 2022-11-03 DIAGNOSIS — L82.1 OTHER SEBORRHEIC KERATOSIS: ICD-10-CM

## 2022-11-03 DIAGNOSIS — L85.3 XEROSIS CUTIS: ICD-10-CM

## 2022-11-03 DIAGNOSIS — L57.0 ACTINIC KERATOSIS: ICD-10-CM

## 2022-11-03 PROBLEM — D22.5 MELANOCYTIC NEVI OF TRUNK: Status: ACTIVE | Noted: 2022-11-03

## 2022-11-03 PROBLEM — D48.5 NEOPLASM OF UNCERTAIN BEHAVIOR OF SKIN: Status: ACTIVE | Noted: 2022-11-03

## 2022-11-03 PROCEDURE — 99213 OFFICE O/P EST LOW 20 MIN: CPT | Mod: 25

## 2022-11-03 PROCEDURE — 11102 TANGNTL BX SKIN SINGLE LES: CPT

## 2022-11-03 PROCEDURE — ? MEDICAL PHOTOGRAPHY REVIEW

## 2022-11-03 PROCEDURE — ? FULL BODY SKIN EXAM

## 2022-11-03 PROCEDURE — 17003 DESTRUCT PREMALG LES 2-14: CPT

## 2022-11-03 PROCEDURE — 17000 DESTRUCT PREMALG LESION: CPT | Mod: 59

## 2022-11-03 PROCEDURE — ? BIOPSY BY SHAVE METHOD

## 2022-11-03 PROCEDURE — ? COUNSELING

## 2022-11-03 PROCEDURE — ? LIQUID NITROGEN

## 2022-11-03 PROCEDURE — ? SUNSCREEN RECOMMENDATIONS

## 2022-11-03 ASSESSMENT — LOCATION ZONE DERM
LOCATION ZONE: NOSE
LOCATION ZONE: TRUNK
LOCATION ZONE: EAR

## 2022-11-03 ASSESSMENT — LOCATION SIMPLE DESCRIPTION DERM
LOCATION SIMPLE: RIGHT EAR
LOCATION SIMPLE: NOSE
LOCATION SIMPLE: RIGHT UPPER BACK
LOCATION SIMPLE: ABDOMEN

## 2022-11-03 ASSESSMENT — LOCATION DETAILED DESCRIPTION DERM
LOCATION DETAILED: XIPHOID
LOCATION DETAILED: NASAL DORSUM
LOCATION DETAILED: RIGHT SUPERIOR MEDIAL UPPER BACK
LOCATION DETAILED: RIGHT SUPERIOR HELIX
LOCATION DETAILED: RIGHT INFERIOR MEDIAL UPPER BACK

## 2022-11-03 ASSESSMENT — PAIN INTENSITY VAS: HOW INTENSE IS YOUR PAIN 0 BEING NO PAIN, 10 BEING THE MOST SEVERE PAIN POSSIBLE?: 1/10 PAIN

## 2022-12-14 ENCOUNTER — APPOINTMENT (RX ONLY)
Dept: URBAN - METROPOLITAN AREA CLINIC 330 | Facility: CLINIC | Age: 77
Setting detail: DERMATOLOGY
End: 2022-12-14

## 2022-12-14 PROBLEM — C44.519 BASAL CELL CARCINOMA OF SKIN OF OTHER PART OF TRUNK: Status: ACTIVE | Noted: 2022-12-14

## 2022-12-14 PROCEDURE — ? COUNSELING

## 2022-12-14 PROCEDURE — 11602 EXC TR-EXT MAL+MARG 1.1-2 CM: CPT

## 2022-12-14 PROCEDURE — 12032 INTMD RPR S/A/T/EXT 2.6-7.5: CPT

## 2022-12-14 PROCEDURE — ? EXCISION

## 2022-12-14 NOTE — PROCEDURE: EXCISION
Biopsy Photograph Reviewed: Yes
Accession #: Skin Pathology
Size Of Lesion In Cm: 0.6
X Size Of Lesion In Cm (Optional): 0
Size Of Margin In Cm: 0.3
Anesthesia Volume In Cc: 9
Was An Eye Clamp Used?: No
Eye Clamp Note Details: An eye clamp was used during the procedure.
Excision Method: Elliptical
Saucerization Depth: dermis and superficial adipose tissue
Repair Type: Intermediate
Suturegard Retention Suture: 2-0 Nylon
Retention Suture Bite Size: 3 mm
Length To Time In Minutes Device Was In Place: 10
Number Of Hemigard Strips Per Side: 1
Intermediate / Complex Repair - Final Wound Length In Cm: 4.4
Undermining Type: Entire Wound
Debridement Text: The wound edges were debrided prior to proceeding with the closure to facilitate wound healing.
Helical Rim Text: The closure involved the helical rim.
Vermilion Border Text: The closure involved the vermilion border.
Nostril Rim Text: The closure involved the nostril rim.
Retention Suture Text: Retention sutures were placed to support the closure and prevent dehiscence.
Suture Removal: 10 days
Lab: 6
Graft Donor Site Bandage (Optional-Leave Blank If You Don't Want In Note): Steri-strips and a pressure bandage were applied to the donor site.
Epidermal Closure Graft Donor Site (Optional): simple interrupted
Billing Type: Third-Party Bill
Excision Depth: adipose tissue
Scalpel Size: 15 blade
Anesthesia Type: 1% lidocaine with epinephrine and a 1:10 solution of 8.4% sodium bicarbonate
Hemostasis: Electrocautery
Estimated Blood Loss (Cc): minimal
Detail Level: Detailed
Deep Sutures: 4-0 Vicryl
Epidermal Sutures: 4-0 Prolene
Wound Care: Petrolatum
Dressing: dry sterile dressing
Suturegard Intro: Intraoperative tissue expansion was performed, utilizing the SUTUREGARD device, in order to reduce wound tension.
Suturegard Body: The suture ends were repeatedly re-tightened and re-clamped to achieve the desired tissue expansion.
Hemigard Intro: Due to skin fragility and wound tension, it was decided to use HEMIGARD adhesive retention suture devices to permit a linear closure. The skin was cleaned and dried for a 6cm distance away from the wound. Excessive hair, if present, was removed to allow for adhesion.
Hemigard Postcare Instructions: The HEMIGARD strips are to remain completely dry for at least 5-7 days.
Positioning (Leave Blank If You Do Not Want): The patient was placed in a comfortable position exposing the surgical site.
Pre-Excision Curettage Text (Leave Blank If You Do Not Want): Prior to drawing the surgical margin the visible lesion was removed with electrodesiccation and curettage to clearly define the lesion size.
Complex Repair Preamble Text (Leave Blank If You Do Not Want): Extensive wide undermining was performed.
Intermediate Repair Preamble Text (Leave Blank If You Do Not Want): Undermining was performed with blunt dissection.
Curvilinear Excision Additional Text (Leave Blank If You Do Not Want): The margin was drawn around the clinically apparent lesion.  A curvilinear shape was then drawn on the skin incorporating the lesion and margins.  Incisions were then made along these lines to the appropriate tissue plane and the lesion was extirpated.
Fusiform Excision Additional Text (Leave Blank If You Do Not Want): The margin was drawn around the clinically apparent lesion.  A fusiform shape was then drawn on the skin incorporating the lesion and margins.  Incisions were then made along these lines to the appropriate tissue plane and the lesion was extirpated.
Elliptical Excision Additional Text (Leave Blank If You Do Not Want): The margin was drawn around the clinically apparent lesion.  An elliptical shape was then drawn on the skin incorporating the lesion and margins.  Incisions were then made along these lines to the appropriate tissue plane and the lesion was extirpated.
Saucerization Excision Additional Text (Leave Blank If You Do Not Want): The margin was drawn around the clinically apparent lesion.  Incisions were then made along these lines, in a tangential fashion, to the appropriate tissue plane and the lesion was extirpated.
Slit Excision Additional Text (Leave Blank If You Do Not Want): A linear line was drawn on the skin overlying the lesion. An incision was made slowly until the lesion was visualized.  Once visualized, the lesion was removed with blunt dissection.
Excisional Biopsy Additional Text (Leave Blank If You Do Not Want): The margin was drawn around the clinically apparent lesion. An elliptical shape was then drawn on the skin incorporating the lesion and margins.  Incisions were then made along these lines to the appropriate tissue plane and the lesion was extirpated.
Perilesional Excision Additional Text (Leave Blank If You Do Not Want): The margin was drawn around the clinically apparent lesion. Incisions were then made along these lines to the appropriate tissue plane and the lesion was extirpated.
Repair Performed By Another Provider Text (Leave Blank If You Do Not Want): After the tissue was excised the defect was repaired by another provider.
No Repair - Repaired With Adjacent Surgical Defect Text (Leave Blank If You Do Not Want): After the excision the defect was repaired concurrently with another surgical defect which was in close approximation.
Adjacent Tissue Transfer Text: The defect edges were debeveled with a #15 scalpel blade.  Given the location of the defect and the proximity to free margins an adjacent tissue transfer was deemed most appropriate.  Using a sterile surgical marker, an appropriate flap was drawn incorporating the defect and placing the expected incisions within the relaxed skin tension lines where possible.    The area thus outlined was incised deep to adipose tissue with a #15 scalpel blade.  The skin margins were undermined to an appropriate distance in all directions utilizing iris scissors.
Advancement Flap (Single) Text: The defect edges were debeveled with a #15 scalpel blade.  Given the location of the defect and the proximity to free margins a single advancement flap was deemed most appropriate.  Using a sterile surgical marker, an appropriate advancement flap was drawn incorporating the defect and placing the expected incisions within the relaxed skin tension lines where possible.    The area thus outlined was incised deep to adipose tissue with a #15 scalpel blade.  The skin margins were undermined to an appropriate distance in all directions utilizing iris scissors.
Advancement Flap (Double) Text: The defect edges were debeveled with a #15 scalpel blade.  Given the location of the defect and the proximity to free margins a double advancement flap was deemed most appropriate.  Using a sterile surgical marker, the appropriate advancement flaps were drawn incorporating the defect and placing the expected incisions within the relaxed skin tension lines where possible.    The area thus outlined was incised deep to adipose tissue with a #15 scalpel blade.  The skin margins were undermined to an appropriate distance in all directions utilizing iris scissors.
Burow's Advancement Flap Text: The defect edges were debeveled with a #15 scalpel blade.  Given the location of the defect and the proximity to free margins a Burow's advancement flap was deemed most appropriate.  Using a sterile surgical marker, the appropriate advancement flap was drawn incorporating the defect and placing the expected incisions within the relaxed skin tension lines where possible.    The area thus outlined was incised deep to adipose tissue with a #15 scalpel blade.  The skin margins were undermined to an appropriate distance in all directions utilizing iris scissors.
Chonodrocutaneous Helical Advancement Flap Text: The defect edges were debeveled with a #15 scalpel blade.  Given the location of the defect and the proximity to free margins a chondrocutaneous helical advancement flap was deemed most appropriate.  Using a sterile surgical marker, the appropriate advancement flap was drawn incorporating the defect and placing the expected incisions within the relaxed skin tension lines where possible.    The area thus outlined was incised deep to adipose tissue with a #15 scalpel blade.  The skin margins were undermined to an appropriate distance in all directions utilizing iris scissors.
Crescentic Advancement Flap Text: The defect edges were debeveled with a #15 scalpel blade.  Given the location of the defect and the proximity to free margins a crescentic advancement flap was deemed most appropriate.  Using a sterile surgical marker, the appropriate advancement flap was drawn incorporating the defect and placing the expected incisions within the relaxed skin tension lines where possible.    The area thus outlined was incised deep to adipose tissue with a #15 scalpel blade.  The skin margins were undermined to an appropriate distance in all directions utilizing iris scissors.
A-T Advancement Flap Text: The defect edges were debeveled with a #15 scalpel blade.  Given the location of the defect, shape of the defect and the proximity to free margins an A-T advancement flap was deemed most appropriate.  Using a sterile surgical marker, an appropriate advancement flap was drawn incorporating the defect and placing the expected incisions within the relaxed skin tension lines where possible.    The area thus outlined was incised deep to adipose tissue with a #15 scalpel blade.  The skin margins were undermined to an appropriate distance in all directions utilizing iris scissors.
O-T Advancement Flap Text: The defect edges were debeveled with a #15 scalpel blade.  Given the location of the defect, shape of the defect and the proximity to free margins an O-T advancement flap was deemed most appropriate.  Using a sterile surgical marker, an appropriate advancement flap was drawn incorporating the defect and placing the expected incisions within the relaxed skin tension lines where possible.    The area thus outlined was incised deep to adipose tissue with a #15 scalpel blade.  The skin margins were undermined to an appropriate distance in all directions utilizing iris scissors.
O-L Flap Text: The defect edges were debeveled with a #15 scalpel blade.  Given the location of the defect, shape of the defect and the proximity to free margins an O-L flap was deemed most appropriate.  Using a sterile surgical marker, an appropriate advancement flap was drawn incorporating the defect and placing the expected incisions within the relaxed skin tension lines where possible.    The area thus outlined was incised deep to adipose tissue with a #15 scalpel blade.  The skin margins were undermined to an appropriate distance in all directions utilizing iris scissors.
O-Z Flap Text: The defect edges were debeveled with a #15 scalpel blade.  Given the location of the defect, shape of the defect and the proximity to free margins an O-Z flap was deemed most appropriate.  Using a sterile surgical marker, an appropriate transposition flap was drawn incorporating the defect and placing the expected incisions within the relaxed skin tension lines where possible. The area thus outlined was incised deep to adipose tissue with a #15 scalpel blade.  The skin margins were undermined to an appropriate distance in all directions utilizing iris scissors.
Double O-Z Flap Text: The defect edges were debeveled with a #15 scalpel blade.  Given the location of the defect, shape of the defect and the proximity to free margins a Double O-Z flap was deemed most appropriate.  Using a sterile surgical marker, an appropriate transposition flap was drawn incorporating the defect and placing the expected incisions within the relaxed skin tension lines where possible. The area thus outlined was incised deep to adipose tissue with a #15 scalpel blade.  The skin margins were undermined to an appropriate distance in all directions utilizing iris scissors.
V-Y Flap Text: The defect edges were debeveled with a #15 scalpel blade.  Given the location of the defect, shape of the defect and the proximity to free margins a V-Y flap was deemed most appropriate.  Using a sterile surgical marker, an appropriate advancement flap was drawn incorporating the defect and placing the expected incisions within the relaxed skin tension lines where possible.    The area thus outlined was incised deep to adipose tissue with a #15 scalpel blade.  The skin margins were undermined to an appropriate distance in all directions utilizing iris scissors.
Advancement-Rotation Flap Text: The defect edges were debeveled with a #15 scalpel blade.  Given the location of the defect, shape of the defect and the proximity to free margins an advancement-rotation flap was deemed most appropriate.  Using a sterile surgical marker, an appropriate flap was drawn incorporating the defect and placing the expected incisions within the relaxed skin tension lines where possible. The area thus outlined was incised deep to adipose tissue with a #15 scalpel blade.  The skin margins were undermined to an appropriate distance in all directions utilizing iris scissors.
Mercedes Flap Text: The defect edges were debeveled with a #15 scalpel blade.  Given the location of the defect, shape of the defect and the proximity to free margins a Mercedes flap was deemed most appropriate.  Using a sterile surgical marker, an appropriate advancement flap was drawn incorporating the defect and placing the expected incisions within the relaxed skin tension lines where possible. The area thus outlined was incised deep to adipose tissue with a #15 scalpel blade.  The skin margins were undermined to an appropriate distance in all directions utilizing iris scissors.
Modified Advancement Flap Text: The defect edges were debeveled with a #15 scalpel blade.  Given the location of the defect, shape of the defect and the proximity to free margins a modified advancement flap was deemed most appropriate.  Using a sterile surgical marker, an appropriate advancement flap was drawn incorporating the defect and placing the expected incisions within the relaxed skin tension lines where possible.    The area thus outlined was incised deep to adipose tissue with a #15 scalpel blade.  The skin margins were undermined to an appropriate distance in all directions utilizing iris scissors.
Mucosal Advancement Flap Text: Given the location of the defect, shape of the defect and the proximity to free margins a mucosal advancement flap was deemed most appropriate. Incisions were made with a 15 blade scalpel in the appropriate fashion along the cutaneous vermilion border and the mucosal lip. The remaining actinically damaged mucosal tissue was excised.  The mucosal advancement flap was then elevated to the gingival sulcus with care taken to preserve the neurovascular structures and advanced into the primary defect. Care was taken to ensure that precise realignment of the vermilion border was achieved.
Peng Advancement Flap Text: The defect edges were debeveled with a #15 scalpel blade.  Given the location of the defect, shape of the defect and the proximity to free margins a Peng advancement flap was deemed most appropriate.  Using a sterile surgical marker, an appropriate advancement flap was drawn incorporating the defect and placing the expected incisions within the relaxed skin tension lines where possible. The area thus outlined was incised deep to adipose tissue with a #15 scalpel blade.  The skin margins were undermined to an appropriate distance in all directions utilizing iris scissors.
Hatchet Flap Text: The defect edges were debeveled with a #15 scalpel blade.  Given the location of the defect, shape of the defect and the proximity to free margins a hatchet flap was deemed most appropriate.  Using a sterile surgical marker, an appropriate hatchet flap was drawn incorporating the defect and placing the expected incisions within the relaxed skin tension lines where possible.    The area thus outlined was incised deep to adipose tissue with a #15 scalpel blade.  The skin margins were undermined to an appropriate distance in all directions utilizing iris scissors.
Rotation Flap Text: The defect edges were debeveled with a #15 scalpel blade.  Given the location of the defect, shape of the defect and the proximity to free margins a rotation flap was deemed most appropriate.  Using a sterile surgical marker, an appropriate rotation flap was drawn incorporating the defect and placing the expected incisions within the relaxed skin tension lines where possible.    The area thus outlined was incised deep to adipose tissue with a #15 scalpel blade.  The skin margins were undermined to an appropriate distance in all directions utilizing iris scissors.
Spiral Flap Text: The defect edges were debeveled with a #15 scalpel blade.  Given the location of the defect, shape of the defect and the proximity to free margins a spiral flap was deemed most appropriate.  Using a sterile surgical marker, an appropriate rotation flap was drawn incorporating the defect and placing the expected incisions within the relaxed skin tension lines where possible. The area thus outlined was incised deep to adipose tissue with a #15 scalpel blade.  The skin margins were undermined to an appropriate distance in all directions utilizing iris scissors.
Staged Advancement Flap Text: The defect edges were debeveled with a #15 scalpel blade.  Given the location of the defect, shape of the defect and the proximity to free margins a staged advancement flap was deemed most appropriate.  Using a sterile surgical marker, an appropriate advancement flap was drawn incorporating the defect and placing the expected incisions within the relaxed skin tension lines where possible. The area thus outlined was incised deep to adipose tissue with a #15 scalpel blade.  The skin margins were undermined to an appropriate distance in all directions utilizing iris scissors.
Star Wedge Flap Text: The defect edges were debeveled with a #15 scalpel blade.  Given the location of the defect, shape of the defect and the proximity to free margins a star wedge flap was deemed most appropriate.  Using a sterile surgical marker, an appropriate rotation flap was drawn incorporating the defect and placing the expected incisions within the relaxed skin tension lines where possible. The area thus outlined was incised deep to adipose tissue with a #15 scalpel blade.  The skin margins were undermined to an appropriate distance in all directions utilizing iris scissors.
Transposition Flap Text: The defect edges were debeveled with a #15 scalpel blade.  Given the location of the defect and the proximity to free margins a transposition flap was deemed most appropriate.  Using a sterile surgical marker, an appropriate transposition flap was drawn incorporating the defect.    The area thus outlined was incised deep to adipose tissue with a #15 scalpel blade.  The skin margins were undermined to an appropriate distance in all directions utilizing iris scissors.
Muscle Hinge Flap Text: The defect edges were debeveled with a #15 scalpel blade.  Given the size, depth and location of the defect and the proximity to free margins a muscle hinge flap was deemed most appropriate.  Using a sterile surgical marker, an appropriate hinge flap was drawn incorporating the defect. The area thus outlined was incised with a #15 scalpel blade.  The skin margins were undermined to an appropriate distance in all directions utilizing iris scissors.
Mustarde Flap Text: The defect edges were debeveled with a #15 scalpel blade.  Given the size, depth and location of the defect and the proximity to free margins a Mustarde flap was deemed most appropriate.  Using a sterile surgical marker, an appropriate flap was drawn incorporating the defect. The area thus outlined was incised with a #15 scalpel blade.  The skin margins were undermined to an appropriate distance in all directions utilizing iris scissors.
Nasal Turnover Hinge Flap Text: The defect edges were debeveled with a #15 scalpel blade.  Given the size, depth, location of the defect and the defect being full thickness a nasal turnover hinge flap was deemed most appropriate.  Using a sterile surgical marker, an appropriate hinge flap was drawn incorporating the defect. The area thus outlined was incised with a #15 scalpel blade. The flap was designed to recreate the nasal mucosal lining and the alar rim. The skin margins were undermined to an appropriate distance in all directions utilizing iris scissors.
Nasalis-Muscle-Based Myocutaneous Island Pedicle Flap Text: Using a #15 blade, an incision was made around the donor flap to the level of the nasalis muscle. Wide lateral undermining was then performed in both the subcutaneous plane above the nasalis muscle, and in a submuscular plane just above periosteum. This allowed the formation of a free nasalis muscle axial pedicle (based on the angular artery) which was still attached to the actual cutaneous flap, increasing its mobility and vascular viability. Hemostasis was obtained with pinpoint electrocoagulation. The flap was mobilized into position and the pivotal anchor points positioned and stabilized with buried interrupted sutures. Subcutaneous and dermal tissues were closed in a multilayered fashion with sutures. Tissue redundancies were excised, and the epidermal edges were apposed without significant tension and sutured with sutures.
Orbicularis Oris Muscle Flap Text: The defect edges were debeveled with a #15 scalpel blade.  Given that the defect affected the competency of the oral sphincter an orbicularis oris muscle flap was deemed most appropriate to restore this competency and normal muscle function.  Using a sterile surgical marker, an appropriate flap was drawn incorporating the defect. The area thus outlined was incised with a #15 scalpel blade.
Melolabial Transposition Flap Text: The defect edges were debeveled with a #15 scalpel blade.  Given the location of the defect and the proximity to free margins a melolabial flap was deemed most appropriate.  Using a sterile surgical marker, an appropriate melolabial transposition flap was drawn incorporating the defect.    The area thus outlined was incised deep to adipose tissue with a #15 scalpel blade.  The skin margins were undermined to an appropriate distance in all directions utilizing iris scissors.
Rhombic Flap Text: The defect edges were debeveled with a #15 scalpel blade.  Given the location of the defect and the proximity to free margins a rhombic flap was deemed most appropriate.  Using a sterile surgical marker, an appropriate rhombic flap was drawn incorporating the defect.    The area thus outlined was incised deep to adipose tissue with a #15 scalpel blade.  The skin margins were undermined to an appropriate distance in all directions utilizing iris scissors.
Rhomboid Transposition Flap Text: The defect edges were debeveled with a #15 scalpel blade.  Given the location of the defect and the proximity to free margins a rhomboid transposition flap was deemed most appropriate.  Using a sterile surgical marker, an appropriate rhomboid flap was drawn incorporating the defect.    The area thus outlined was incised deep to adipose tissue with a #15 scalpel blade.  The skin margins were undermined to an appropriate distance in all directions utilizing iris scissors.
Bi-Rhombic Flap Text: The defect edges were debeveled with a #15 scalpel blade.  Given the location of the defect and the proximity to free margins a bi-rhombic flap was deemed most appropriate.  Using a sterile surgical marker, an appropriate rhombic flap was drawn incorporating the defect. The area thus outlined was incised deep to adipose tissue with a #15 scalpel blade.  The skin margins were undermined to an appropriate distance in all directions utilizing iris scissors.
Helical Rim Advancement Flap Text: The defect edges were debeveled with a #15 blade scalpel.  Given the location of the defect and the proximity to free margins (helical rim) a double helical rim advancement flap was deemed most appropriate.  Using a sterile surgical marker, the appropriate advancement flaps were drawn incorporating the defect and placing the expected incisions between the helical rim and antihelix where possible.  The area thus outlined was incised through and through with a #15 scalpel blade.  With a skin hook and iris scissors, the flaps were gently and sharply undermined and freed up.
Bilateral Helical Rim Advancement Flap Text: The defect edges were debeveled with a #15 blade scalpel.  Given the location of the defect and the proximity to free margins (helical rim) a bilateral helical rim advancement flap was deemed most appropriate.  Using a sterile surgical marker, the appropriate advancement flaps were drawn incorporating the defect and placing the expected incisions between the helical rim and antihelix where possible.  The area thus outlined was incised through and through with a #15 scalpel blade.  With a skin hook and iris scissors, the flaps were gently and sharply undermined and freed up.
Ear Star Wedge Flap Text: The defect edges were debeveled with a #15 blade scalpel.  Given the location of the defect and the proximity to free margins (helical rim) an ear star wedge flap was deemed most appropriate.  Using a sterile surgical marker, the appropriate flap was drawn incorporating the defect and placing the expected incisions between the helical rim and antihelix where possible.  The area thus outlined was incised through and through with a #15 scalpel blade.
Banner Transposition Flap Text: The defect edges were debeveled with a #15 scalpel blade.  Given the location of the defect and the proximity to free margins a Banner transposition flap was deemed most appropriate.  Using a sterile surgical marker, an appropriate flap drawn around the defect. The area thus outlined was incised deep to adipose tissue with a #15 scalpel blade.  The skin margins were undermined to an appropriate distance in all directions utilizing iris scissors.
Bilobed Flap Text: The defect edges were debeveled with a #15 scalpel blade.  Given the location of the defect and the proximity to free margins a bilobe flap was deemed most appropriate.  Using a sterile surgical marker, an appropriate bilobe flap drawn around the defect.    The area thus outlined was incised deep to adipose tissue with a #15 scalpel blade.  The skin margins were undermined to an appropriate distance in all directions utilizing iris scissors.
Bilobed Transposition Flap Text: The defect edges were debeveled with a #15 scalpel blade.  Given the location of the defect and the proximity to free margins a bilobed transposition flap was deemed most appropriate.  Using a sterile surgical marker, an appropriate bilobe flap drawn around the defect.    The area thus outlined was incised deep to adipose tissue with a #15 scalpel blade.  The skin margins were undermined to an appropriate distance in all directions utilizing iris scissors.
Trilobed Flap Text: The defect edges were debeveled with a #15 scalpel blade.  Given the location of the defect and the proximity to free margins a trilobed flap was deemed most appropriate.  Using a sterile surgical marker, an appropriate trilobed flap drawn around the defect.    The area thus outlined was incised deep to adipose tissue with a #15 scalpel blade.  The skin margins were undermined to an appropriate distance in all directions utilizing iris scissors.
Dorsal Nasal Flap Text: The defect edges were debeveled with a #15 scalpel blade.  Given the location of the defect and the proximity to free margins a dorsal nasal flap was deemed most appropriate.  Using a sterile surgical marker, an appropriate dorsal nasal flap was drawn around the defect.    The area thus outlined was incised deep to adipose tissue with a #15 scalpel blade.  The skin margins were undermined to an appropriate distance in all directions utilizing iris scissors.
Island Pedicle Flap Text: The defect edges were debeveled with a #15 scalpel blade.  Given the location of the defect, shape of the defect and the proximity to free margins an island pedicle advancement flap was deemed most appropriate.  Using a sterile surgical marker, an appropriate advancement flap was drawn incorporating the defect, outlining the appropriate donor tissue and placing the expected incisions within the relaxed skin tension lines where possible.    The area thus outlined was incised deep to adipose tissue with a #15 scalpel blade.  The skin margins were undermined to an appropriate distance in all directions around the primary defect and laterally outward around the island pedicle utilizing iris scissors.  There was minimal undermining beneath the pedicle flap.
Island Pedicle Flap With Canthal Suspension Text: The defect edges were debeveled with a #15 scalpel blade.  Given the location of the defect, shape of the defect and the proximity to free margins an island pedicle advancement flap was deemed most appropriate.  Using a sterile surgical marker, an appropriate advancement flap was drawn incorporating the defect, outlining the appropriate donor tissue and placing the expected incisions within the relaxed skin tension lines where possible. The area thus outlined was incised deep to adipose tissue with a #15 scalpel blade.  The skin margins were undermined to an appropriate distance in all directions around the primary defect and laterally outward around the island pedicle utilizing iris scissors.  There was minimal undermining beneath the pedicle flap. A suspension suture was placed in the canthal tendon to prevent tension and prevent ectropion.
Alar Island Pedicle Flap Text: The defect edges were debeveled with a #15 scalpel blade.  Given the location of the defect, shape of the defect and the proximity to the alar rim an island pedicle advancement flap was deemed most appropriate.  Using a sterile surgical marker, an appropriate advancement flap was drawn incorporating the defect, outlining the appropriate donor tissue and placing the expected incisions within the nasal ala running parallel to the alar rim. The area thus outlined was incised with a #15 scalpel blade.  The skin margins were undermined minimally to an appropriate distance in all directions around the primary defect and laterally outward around the island pedicle utilizing iris scissors.  There was minimal undermining beneath the pedicle flap.
Double Island Pedicle Flap Text: The defect edges were debeveled with a #15 scalpel blade.  Given the location of the defect, shape of the defect and the proximity to free margins a double island pedicle advancement flap was deemed most appropriate.  Using a sterile surgical marker, an appropriate advancement flap was drawn incorporating the defect, outlining the appropriate donor tissue and placing the expected incisions within the relaxed skin tension lines where possible.    The area thus outlined was incised deep to adipose tissue with a #15 scalpel blade.  The skin margins were undermined to an appropriate distance in all directions around the primary defect and laterally outward around the island pedicle utilizing iris scissors.  There was minimal undermining beneath the pedicle flap.
Island Pedicle Flap-Requiring Vessel Identification Text: The defect edges were debeveled with a #15 scalpel blade.  Given the location of the defect, shape of the defect and the proximity to free margins an island pedicle advancement flap was deemed most appropriate.  Using a sterile surgical marker, an appropriate advancement flap was drawn, based on the axial vessel mentioned above, incorporating the defect, outlining the appropriate donor tissue and placing the expected incisions within the relaxed skin tension lines where possible.    The area thus outlined was incised deep to adipose tissue with a #15 scalpel blade.  The skin margins were undermined to an appropriate distance in all directions around the primary defect and laterally outward around the island pedicle utilizing iris scissors.  There was minimal undermining beneath the pedicle flap.
Keystone Flap Text: The defect edges were debeveled with a #15 scalpel blade.  Given the location of the defect, shape of the defect a keystone flap was deemed most appropriate.  Using a sterile surgical marker, an appropriate keystone flap was drawn incorporating the defect, outlining the appropriate donor tissue and placing the expected incisions within the relaxed skin tension lines where possible. The area thus outlined was incised deep to adipose tissue with a #15 scalpel blade.  The skin margins were undermined to an appropriate distance in all directions around the primary defect and laterally outward around the flap utilizing iris scissors.
O-T Plasty Text: The defect edges were debeveled with a #15 scalpel blade.  Given the location of the defect, shape of the defect and the proximity to free margins an O-T plasty was deemed most appropriate.  Using a sterile surgical marker, an appropriate O-T plasty was drawn incorporating the defect and placing the expected incisions within the relaxed skin tension lines where possible.    The area thus outlined was incised deep to adipose tissue with a #15 scalpel blade.  The skin margins were undermined to an appropriate distance in all directions utilizing iris scissors.
O-Z Plasty Text: The defect edges were debeveled with a #15 scalpel blade.  Given the location of the defect, shape of the defect and the proximity to free margins an O-Z plasty (double transposition flap) was deemed most appropriate.  Using a sterile surgical marker, the appropriate transposition flaps were drawn incorporating the defect and placing the expected incisions within the relaxed skin tension lines where possible.    The area thus outlined was incised deep to adipose tissue with a #15 scalpel blade.  The skin margins were undermined to an appropriate distance in all directions utilizing iris scissors.  Hemostasis was achieved with electrocautery.  The flaps were then transposed into place, one clockwise and the other counterclockwise, and anchored with interrupted buried subcutaneous sutures.
Double O-Z Plasty Text: The defect edges were debeveled with a #15 scalpel blade.  Given the location of the defect, shape of the defect and the proximity to free margins a Double O-Z plasty (double transposition flap) was deemed most appropriate.  Using a sterile surgical marker, the appropriate transposition flaps were drawn incorporating the defect and placing the expected incisions within the relaxed skin tension lines where possible. The area thus outlined was incised deep to adipose tissue with a #15 scalpel blade.  The skin margins were undermined to an appropriate distance in all directions utilizing iris scissors.  Hemostasis was achieved with electrocautery.  The flaps were then transposed into place, one clockwise and the other counterclockwise, and anchored with interrupted buried subcutaneous sutures.
V-Y Plasty Text: The defect edges were debeveled with a #15 scalpel blade.  Given the location of the defect, shape of the defect and the proximity to free margins an V-Y advancement flap was deemed most appropriate.  Using a sterile surgical marker, an appropriate advancement flap was drawn incorporating the defect and placing the expected incisions within the relaxed skin tension lines where possible.    The area thus outlined was incised deep to adipose tissue with a #15 scalpel blade.  The skin margins were undermined to an appropriate distance in all directions utilizing iris scissors.
H Plasty Text: Given the location of the defect, shape of the defect and the proximity to free margins a H-plasty was deemed most appropriate for repair.  Using a sterile surgical marker, the appropriate advancement arms of the H-plasty were drawn incorporating the defect and placing the expected incisions within the relaxed skin tension lines where possible. The area thus outlined was incised deep to adipose tissue with a #15 scalpel blade. The skin margins were undermined to an appropriate distance in all directions utilizing iris scissors.  The opposing advancement arms were then advanced into place in opposite direction and anchored with interrupted buried subcutaneous sutures.
W Plasty Text: The lesion was extirpated to the level of the fat with a #15 scalpel blade.  Given the location of the defect, shape of the defect and the proximity to free margins a W-plasty was deemed most appropriate for repair.  Using a sterile surgical marker, the appropriate transposition arms of the W-plasty were drawn incorporating the defect and placing the expected incisions within the relaxed skin tension lines where possible.    The area thus outlined was incised deep to adipose tissue with a #15 scalpel blade.  The skin margins were undermined to an appropriate distance in all directions utilizing iris scissors.  The opposing transposition arms were then transposed into place in opposite direction and anchored with interrupted buried subcutaneous sutures.
Z Plasty Text: The lesion was extirpated to the level of the fat with a #15 scalpel blade.  Given the location of the defect, shape of the defect and the proximity to free margins a Z-plasty was deemed most appropriate for repair.  Using a sterile surgical marker, the appropriate transposition arms of the Z-plasty were drawn incorporating the defect and placing the expected incisions within the relaxed skin tension lines where possible.    The area thus outlined was incised deep to adipose tissue with a #15 scalpel blade.  The skin margins were undermined to an appropriate distance in all directions utilizing iris scissors.  The opposing transposition arms were then transposed into place in opposite direction and anchored with interrupted buried subcutaneous sutures.
Zygomaticofacial Flap Text: Given the location of the defect, shape of the defect and the proximity to free margins a zygomaticofacial flap was deemed most appropriate for repair.  Using a sterile surgical marker, the appropriate flap was drawn incorporating the defect and placing the expected incisions within the relaxed skin tension lines where possible. The area thus outlined was incised deep to adipose tissue with a #15 scalpel blade with preservation of a vascular pedicle.  The skin margins were undermined to an appropriate distance in all directions utilizing iris scissors.  The flap was then placed into the defect and anchored with interrupted buried subcutaneous sutures.
Cheek Interpolation Flap Text: A decision was made to reconstruct the defect utilizing an interpolation axial flap and a staged reconstruction.  A telfa template was made of the defect.  This telfa template was then used to outline the Cheek Interpolation flap.  The donor area for the pedicle flap was then injected with anesthesia.  The flap was excised through the skin and subcutaneous tissue down to the layer of the underlying musculature.  The interpolation flap was carefully excised within this deep plane to maintain its blood supply.  The edges of the donor site were undermined.   The donor site was closed in a primary fashion.  The pedicle was then rotated into position and sutured.  Once the tube was sutured into place, adequate blood supply was confirmed with blanching and refill.  The pedicle was then wrapped with xeroform gauze and dressed appropriately with a telfa and gauze bandage to ensure continued blood supply and protect the attached pedicle.
Cheek-To-Nose Interpolation Flap Text: A decision was made to reconstruct the defect utilizing an interpolation axial flap and a staged reconstruction.  A telfa template was made of the defect.  This telfa template was then used to outline the Cheek-To-Nose Interpolation flap.  The donor area for the pedicle flap was then injected with anesthesia.  The flap was excised through the skin and subcutaneous tissue down to the layer of the underlying musculature.  The interpolation flap was carefully excised within this deep plane to maintain its blood supply.  The edges of the donor site were undermined.   The donor site was closed in a primary fashion.  The pedicle was then rotated into position and sutured.  Once the tube was sutured into place, adequate blood supply was confirmed with blanching and refill.  The pedicle was then wrapped with xeroform gauze and dressed appropriately with a telfa and gauze bandage to ensure continued blood supply and protect the attached pedicle.
Interpolation Flap Text: A decision was made to reconstruct the defect utilizing an interpolation axial flap and a staged reconstruction.  A telfa template was made of the defect.  This telfa template was then used to outline the interpolation flap.  The donor area for the pedicle flap was then injected with anesthesia.  The flap was excised through the skin and subcutaneous tissue down to the layer of the underlying musculature.  The interpolation flap was carefully excised within this deep plane to maintain its blood supply.  The edges of the donor site were undermined.   The donor site was closed in a primary fashion.  The pedicle was then rotated into position and sutured.  Once the tube was sutured into place, adequate blood supply was confirmed with blanching and refill.  The pedicle was then wrapped with xeroform gauze and dressed appropriately with a telfa and gauze bandage to ensure continued blood supply and protect the attached pedicle.
Melolabial Interpolation Flap Text: A decision was made to reconstruct the defect utilizing an interpolation axial flap and a staged reconstruction.  A telfa template was made of the defect.  This telfa template was then used to outline the melolabial interpolation flap.  The donor area for the pedicle flap was then injected with anesthesia.  The flap was excised through the skin and subcutaneous tissue down to the layer of the underlying musculature.  The pedicle flap was carefully excised within this deep plane to maintain its blood supply.  The edges of the donor site were undermined.   The donor site was closed in a primary fashion.  The pedicle was then rotated into position and sutured.  Once the tube was sutured into place, adequate blood supply was confirmed with blanching and refill.  The pedicle was then wrapped with xeroform gauze and dressed appropriately with a telfa and gauze bandage to ensure continued blood supply and protect the attached pedicle.
Mastoid Interpolation Flap Text: A decision was made to reconstruct the defect utilizing an interpolation axial flap and a staged reconstruction.  A telfa template was made of the defect.  This telfa template was then used to outline the mastoid interpolation flap.  The donor area for the pedicle flap was then injected with anesthesia.  The flap was excised through the skin and subcutaneous tissue down to the layer of the underlying musculature.  The pedicle flap was carefully excised within this deep plane to maintain its blood supply.  The edges of the donor site were undermined.   The donor site was closed in a primary fashion.  The pedicle was then rotated into position and sutured.  Once the tube was sutured into place, adequate blood supply was confirmed with blanching and refill.  The pedicle was then wrapped with xeroform gauze and dressed appropriately with a telfa and gauze bandage to ensure continued blood supply and protect the attached pedicle.
Posterior Auricular Interpolation Flap Text: A decision was made to reconstruct the defect utilizing an interpolation axial flap and a staged reconstruction.  A telfa template was made of the defect.  This telfa template was then used to outline the posterior auricular interpolation flap.  The donor area for the pedicle flap was then injected with anesthesia.  The flap was excised through the skin and subcutaneous tissue down to the layer of the underlying musculature.  The pedicle flap was carefully excised within this deep plane to maintain its blood supply.  The edges of the donor site were undermined.   The donor site was closed in a primary fashion.  The pedicle was then rotated into position and sutured.  Once the tube was sutured into place, adequate blood supply was confirmed with blanching and refill.  The pedicle was then wrapped with xeroform gauze and dressed appropriately with a telfa and gauze bandage to ensure continued blood supply and protect the attached pedicle.
Paramedian Forehead Flap Text: A decision was made to reconstruct the defect utilizing an interpolation axial flap and a staged reconstruction.  A telfa template was made of the defect.  This telfa template was then used to outline the paramedian forehead pedicle flap.  The donor area for the pedicle flap was then injected with anesthesia.  The flap was excised through the skin and subcutaneous tissue down to the layer of the underlying musculature.  The pedicle flap was carefully excised within this deep plane to maintain its blood supply.  The edges of the donor site were undermined.   The donor site was closed in a primary fashion.  The pedicle was then rotated into position and sutured.  Once the tube was sutured into place, adequate blood supply was confirmed with blanching and refill.  The pedicle was then wrapped with xeroform gauze and dressed appropriately with a telfa and gauze bandage to ensure continued blood supply and protect the attached pedicle.
Abbe Flap (Upper To Lower Lip) Text: The defect of the lower lip was assessed and measured.  Given the location and size of the defect, an Abbe flap was deemed most appropriate.  Using a sterile surgical marker, an appropriate Abbe flap was measured and drawn on the upper lip. Local anesthesia was then infiltrated.  A scalpel was then used to incise the upper lip through and through the skin, vermilion, muscle and mucosa, leaving the flap pedicled on the opposite side.  The flap was then rotated and transferred to the lower lip defect.  The flap was then sutured into place with a three layer technique, closing the orbicularis oris muscle layer with subcutaneous buried sutures, followed by a mucosal layer and an epidermal layer.
Abbe Flap (Lower To Upper Lip) Text: The defect of the upper lip was assessed and measured.  Given the location and size of the defect, an Abbe flap was deemed most appropriate.  Using a sterile surgical marker, an appropriate Abbe flap was measured and drawn on the lower lip. Local anesthesia was then infiltrated. A scalpel was then used to incise the upper lip through and through the skin, vermilion, muscle and mucosa, leaving the flap pedicled on the opposite side.  The flap was then rotated and transferred to the lower lip defect.  The flap was then sutured into place with a three layer technique, closing the orbicularis oris muscle layer with subcutaneous buried sutures, followed by a mucosal layer and an epidermal layer.
Estlander Flap (Upper To Lower Lip) Text: The defect of the lower lip was assessed and measured.  Given the location and size of the defect, an Estlander flap was deemed most appropriate.  Using a sterile surgical marker, an appropriate Estlander flap was measured and drawn on the upper lip. Local anesthesia was then infiltrated. A scalpel was then used to incise the lateral aspect of the flap, through skin, muscle and mucosa, leaving the flap pedicled medially.  The flap was then rotated and positioned to fill the lower lip defect.  The flap was then sutured into place with a three layer technique, closing the orbicularis oris muscle layer with subcutaneous buried sutures, followed by a mucosal layer and an epidermal layer.
Lip Wedge Excision Repair Text: Given the location of the defect and the proximity to free margins a full thickness wedge repair was deemed most appropriate.  Using a sterile surgical marker, the appropriate repair was drawn incorporating the defect and placing the expected incisions perpendicular to the vermilion border.  The vermilion border was also meticulously outlined to ensure appropriate reapproximation during the repair.  The area thus outlined was incised through and through with a #15 scalpel blade.  The muscularis and dermis were reaproximated with deep sutures following hemostasis. Care was taken to realign the vermilion border before proceeding with the superficial closure.  Once the vermilion was realigned the superfical and mucosal closure was finished.
Ftsg Text: The defect edges were debeveled with a #15 scalpel blade.  Given the location of the defect, shape of the defect and the proximity to free margins a full thickness skin graft was deemed most appropriate.  Using a sterile surgical marker, the primary defect shape was transferred to the donor site. The area thus outlined was incised deep to adipose tissue with a #15 scalpel blade.  The harvested graft was then trimmed of adipose tissue until only dermis and epidermis was left.  The skin margins of the secondary defect were undermined to an appropriate distance in all directions utilizing iris scissors.  The secondary defect was closed with interrupted buried subcutaneous sutures.  The skin edges were then re-apposed with running  sutures.  The skin graft was then placed in the primary defect and oriented appropriately.
Split-Thickness Skin Graft Text: The defect edges were debeveled with a #15 scalpel blade.  Given the location of the defect, shape of the defect and the proximity to free margins a split thickness skin graft was deemed most appropriate.  Using a sterile surgical marker, the primary defect shape was transferred to the donor site. The split thickness graft was then harvested.  The skin graft was then placed in the primary defect and oriented appropriately.
Burow's Graft Text: The defect edges were debeveled with a #15 scalpel blade.  Given the location of the defect, shape of the defect, the proximity to free margins and the presence of a standing cone deformity a Burow's skin graft was deemed most appropriate. The standing cone was removed and this tissue was then trimmed to the shape of the primary defect. The adipose tissue was also removed until only dermis and epidermis were left.  The skin margins of the secondary defect were undermined to an appropriate distance in all directions utilizing iris scissors.  The secondary defect was closed with interrupted buried subcutaneous sutures.  The skin edges were then re-apposed with running  sutures.  The skin graft was then placed in the primary defect and oriented appropriately.
Cartilage Graft Text: The defect edges were debeveled with a #15 scalpel blade.  Given the location of the defect, shape of the defect, the fact the defect involved a full thickness cartilage defect a cartilage graft was deemed most appropriate.  An appropriate donor site was identified, cleansed, and anesthetized. The cartilage graft was then harvested and transferred to the recipient site, oriented appropriately and then sutured into place.  The secondary defect was then repaired using a primary closure.
Composite Graft Text: The defect edges were debeveled with a #15 scalpel blade.  Given the location of the defect, shape of the defect, the proximity to free margins and the fact the defect was full thickness a composite graft was deemed most appropriate.  The defect was outline and then transferred to the donor site.  A full thickness graft was then excised from the donor site. The graft was then placed in the primary defect, oriented appropriately and then sutured into place.  The secondary defect was then repaired using a primary closure.
Epidermal Autograft Text: The defect edges were debeveled with a #15 scalpel blade.  Given the location of the defect, shape of the defect and the proximity to free margins an epidermal autograft was deemed most appropriate.  Using a sterile surgical marker, the primary defect shape was transferred to the donor site. The epidermal graft was then harvested.  The skin graft was then placed in the primary defect and oriented appropriately.
Dermal Autograft Text: The defect edges were debeveled with a #15 scalpel blade.  Given the location of the defect, shape of the defect and the proximity to free margins a dermal autograft was deemed most appropriate.  Using a sterile surgical marker, the primary defect shape was transferred to the donor site. The area thus outlined was incised deep to adipose tissue with a #15 scalpel blade.  The harvested graft was then trimmed of adipose and epidermal tissue until only dermis was left.  The skin graft was then placed in the primary defect and oriented appropriately.
Skin Substitute Text: The defect edges were debeveled with a #15 scalpel blade.  Given the location of the defect, shape of the defect and the proximity to free margins a skin substitute graft was deemed most appropriate.  The graft material was trimmed to fit the size of the defect. The graft was then placed in the primary defect and oriented appropriately.
Tissue Cultured Epidermal Autograft Text: The defect edges were debeveled with a #15 scalpel blade.  Given the location of the defect, shape of the defect and the proximity to free margins a tissue cultured epidermal autograft was deemed most appropriate.  The graft was then trimmed to fit the size of the defect.  The graft was then placed in the primary defect and oriented appropriately.
Xenograft Text: The defect edges were debeveled with a #15 scalpel blade.  Given the location of the defect, shape of the defect and the proximity to free margins a xenograft was deemed most appropriate.  The graft was then trimmed to fit the size of the defect.  The graft was then placed in the primary defect and oriented appropriately.
Purse String (Intermediate) Text: Given the location of the defect and the characteristics of the surrounding skin a purse string intermediate closure was deemed most appropriate.  Undermining was performed circumfirentially around the surgical defect.  A purse string suture was then placed and tightened.
Purse String (Simple) Text: Given the location of the defect and the characteristics of the surrounding skin a purse string simple closure was deemed most appropriate.  Undermining was performed circumferentially around the surgical defect.  A purse string suture was then placed and tightened.
Partial Purse String (Intermediate) Text: Given the location of the defect and the characteristics of the surrounding skin an intermediate purse string closure was deemed most appropriate.  Undermining was performed circumferentially around the surgical defect.  A purse string suture was then placed and tightened. Wound tension of the circular defect prevented complete closure of the wound.
Partial Purse String (Simple) Text: Given the location of the defect and the characteristics of the surrounding skin a simple purse string closure was deemed most appropriate.  Undermining was performed circumferentially around the surgical defect.  A purse string suture was then placed and tightened. Wound tension of the circular defect prevented complete closure of the wound.
Complex Repair And Single Advancement Flap Text: The defect edges were debeveled with a #15 scalpel blade.  The primary defect was closed partially with a complex linear closure.  Given the location of the remaining defect, shape of the defect and the proximity to free margins a single advancement flap was deemed most appropriate for complete closure of the defect.  Using a sterile surgical marker, an appropriate advancement flap was drawn incorporating the defect and placing the expected incisions within the relaxed skin tension lines where possible.    The area thus outlined was incised deep to adipose tissue with a #15 scalpel blade.  The skin margins were undermined to an appropriate distance in all directions utilizing iris scissors.
Complex Repair And Double Advancement Flap Text: The defect edges were debeveled with a #15 scalpel blade.  The primary defect was closed partially with a complex linear closure.  Given the location of the remaining defect, shape of the defect and the proximity to free margins a double advancement flap was deemed most appropriate for complete closure of the defect.  Using a sterile surgical marker, an appropriate advancement flap was drawn incorporating the defect and placing the expected incisions within the relaxed skin tension lines where possible.    The area thus outlined was incised deep to adipose tissue with a #15 scalpel blade.  The skin margins were undermined to an appropriate distance in all directions utilizing iris scissors.
Complex Repair And Modified Advancement Flap Text: The defect edges were debeveled with a #15 scalpel blade.  The primary defect was closed partially with a complex linear closure.  Given the location of the remaining defect, shape of the defect and the proximity to free margins a modified advancement flap was deemed most appropriate for complete closure of the defect.  Using a sterile surgical marker, an appropriate advancement flap was drawn incorporating the defect and placing the expected incisions within the relaxed skin tension lines where possible.    The area thus outlined was incised deep to adipose tissue with a #15 scalpel blade.  The skin margins were undermined to an appropriate distance in all directions utilizing iris scissors.
Complex Repair And A-T Advancement Flap Text: The defect edges were debeveled with a #15 scalpel blade.  The primary defect was closed partially with a complex linear closure.  Given the location of the remaining defect, shape of the defect and the proximity to free margins an A-T advancement flap was deemed most appropriate for complete closure of the defect.  Using a sterile surgical marker, an appropriate advancement flap was drawn incorporating the defect and placing the expected incisions within the relaxed skin tension lines where possible.    The area thus outlined was incised deep to adipose tissue with a #15 scalpel blade.  The skin margins were undermined to an appropriate distance in all directions utilizing iris scissors.
Complex Repair And O-T Advancement Flap Text: The defect edges were debeveled with a #15 scalpel blade.  The primary defect was closed partially with a complex linear closure.  Given the location of the remaining defect, shape of the defect and the proximity to free margins an O-T advancement flap was deemed most appropriate for complete closure of the defect.  Using a sterile surgical marker, an appropriate advancement flap was drawn incorporating the defect and placing the expected incisions within the relaxed skin tension lines where possible.    The area thus outlined was incised deep to adipose tissue with a #15 scalpel blade.  The skin margins were undermined to an appropriate distance in all directions utilizing iris scissors.
Complex Repair And O-L Flap Text: The defect edges were debeveled with a #15 scalpel blade.  The primary defect was closed partially with a complex linear closure.  Given the location of the remaining defect, shape of the defect and the proximity to free margins an O-L flap was deemed most appropriate for complete closure of the defect.  Using a sterile surgical marker, an appropriate flap was drawn incorporating the defect and placing the expected incisions within the relaxed skin tension lines where possible.    The area thus outlined was incised deep to adipose tissue with a #15 scalpel blade.  The skin margins were undermined to an appropriate distance in all directions utilizing iris scissors.
Complex Repair And Bilobe Flap Text: The defect edges were debeveled with a #15 scalpel blade.  The primary defect was closed partially with a complex linear closure.  Given the location of the remaining defect, shape of the defect and the proximity to free margins a bilobe flap was deemed most appropriate for complete closure of the defect.  Using a sterile surgical marker, an appropriate advancement flap was drawn incorporating the defect and placing the expected incisions within the relaxed skin tension lines where possible.    The area thus outlined was incised deep to adipose tissue with a #15 scalpel blade.  The skin margins were undermined to an appropriate distance in all directions utilizing iris scissors.
Complex Repair And Melolabial Flap Text: The defect edges were debeveled with a #15 scalpel blade.  The primary defect was closed partially with a complex linear closure.  Given the location of the remaining defect, shape of the defect and the proximity to free margins a melolabial flap was deemed most appropriate for complete closure of the defect.  Using a sterile surgical marker, an appropriate advancement flap was drawn incorporating the defect and placing the expected incisions within the relaxed skin tension lines where possible.    The area thus outlined was incised deep to adipose tissue with a #15 scalpel blade.  The skin margins were undermined to an appropriate distance in all directions utilizing iris scissors.
Complex Repair And Rotation Flap Text: The defect edges were debeveled with a #15 scalpel blade.  The primary defect was closed partially with a complex linear closure.  Given the location of the remaining defect, shape of the defect and the proximity to free margins a rotation flap was deemed most appropriate for complete closure of the defect.  Using a sterile surgical marker, an appropriate advancement flap was drawn incorporating the defect and placing the expected incisions within the relaxed skin tension lines where possible.    The area thus outlined was incised deep to adipose tissue with a #15 scalpel blade.  The skin margins were undermined to an appropriate distance in all directions utilizing iris scissors.
Complex Repair And Rhombic Flap Text: The defect edges were debeveled with a #15 scalpel blade.  The primary defect was closed partially with a complex linear closure.  Given the location of the remaining defect, shape of the defect and the proximity to free margins a rhombic flap was deemed most appropriate for complete closure of the defect.  Using a sterile surgical marker, an appropriate advancement flap was drawn incorporating the defect and placing the expected incisions within the relaxed skin tension lines where possible.    The area thus outlined was incised deep to adipose tissue with a #15 scalpel blade.  The skin margins were undermined to an appropriate distance in all directions utilizing iris scissors.
Complex Repair And Transposition Flap Text: The defect edges were debeveled with a #15 scalpel blade.  The primary defect was closed partially with a complex linear closure.  Given the location of the remaining defect, shape of the defect and the proximity to free margins a transposition flap was deemed most appropriate for complete closure of the defect.  Using a sterile surgical marker, an appropriate advancement flap was drawn incorporating the defect and placing the expected incisions within the relaxed skin tension lines where possible.    The area thus outlined was incised deep to adipose tissue with a #15 scalpel blade.  The skin margins were undermined to an appropriate distance in all directions utilizing iris scissors.
Complex Repair And V-Y Plasty Text: The defect edges were debeveled with a #15 scalpel blade.  The primary defect was closed partially with a complex linear closure.  Given the location of the remaining defect, shape of the defect and the proximity to free margins a V-Y plasty was deemed most appropriate for complete closure of the defect.  Using a sterile surgical marker, an appropriate advancement flap was drawn incorporating the defect and placing the expected incisions within the relaxed skin tension lines where possible.    The area thus outlined was incised deep to adipose tissue with a #15 scalpel blade.  The skin margins were undermined to an appropriate distance in all directions utilizing iris scissors.
Complex Repair And M Plasty Text: The defect edges were debeveled with a #15 scalpel blade.  The primary defect was closed partially with a complex linear closure.  Given the location of the remaining defect, shape of the defect and the proximity to free margins an M plasty was deemed most appropriate for complete closure of the defect.  Using a sterile surgical marker, an appropriate advancement flap was drawn incorporating the defect and placing the expected incisions within the relaxed skin tension lines where possible.    The area thus outlined was incised deep to adipose tissue with a #15 scalpel blade.  The skin margins were undermined to an appropriate distance in all directions utilizing iris scissors.
Complex Repair And Double M Plasty Text: The defect edges were debeveled with a #15 scalpel blade.  The primary defect was closed partially with a complex linear closure.  Given the location of the remaining defect, shape of the defect and the proximity to free margins a double M plasty was deemed most appropriate for complete closure of the defect.  Using a sterile surgical marker, an appropriate advancement flap was drawn incorporating the defect and placing the expected incisions within the relaxed skin tension lines where possible.    The area thus outlined was incised deep to adipose tissue with a #15 scalpel blade.  The skin margins were undermined to an appropriate distance in all directions utilizing iris scissors.
Complex Repair And W Plasty Text: The defect edges were debeveled with a #15 scalpel blade.  The primary defect was closed partially with a complex linear closure.  Given the location of the remaining defect, shape of the defect and the proximity to free margins a W plasty was deemed most appropriate for complete closure of the defect.  Using a sterile surgical marker, an appropriate advancement flap was drawn incorporating the defect and placing the expected incisions within the relaxed skin tension lines where possible.    The area thus outlined was incised deep to adipose tissue with a #15 scalpel blade.  The skin margins were undermined to an appropriate distance in all directions utilizing iris scissors.
Complex Repair And Z Plasty Text: The defect edges were debeveled with a #15 scalpel blade.  The primary defect was closed partially with a complex linear closure.  Given the location of the remaining defect, shape of the defect and the proximity to free margins a Z plasty was deemed most appropriate for complete closure of the defect.  Using a sterile surgical marker, an appropriate advancement flap was drawn incorporating the defect and placing the expected incisions within the relaxed skin tension lines where possible.    The area thus outlined was incised deep to adipose tissue with a #15 scalpel blade.  The skin margins were undermined to an appropriate distance in all directions utilizing iris scissors.
Complex Repair And Dorsal Nasal Flap Text: The defect edges were debeveled with a #15 scalpel blade.  The primary defect was closed partially with a complex linear closure.  Given the location of the remaining defect, shape of the defect and the proximity to free margins a dorsal nasal flap was deemed most appropriate for complete closure of the defect.  Using a sterile surgical marker, an appropriate flap was drawn incorporating the defect and placing the expected incisions within the relaxed skin tension lines where possible.    The area thus outlined was incised deep to adipose tissue with a #15 scalpel blade.  The skin margins were undermined to an appropriate distance in all directions utilizing iris scissors.
Complex Repair And Ftsg Text: The defect edges were debeveled with a #15 scalpel blade.  The primary defect was closed partially with a complex linear closure.  Given the location of the defect, shape of the defect and the proximity to free margins a full thickness skin graft was deemed most appropriate to repair the remaining defect.  The graft was trimmed to fit the size of the remaining defect.  The graft was then placed in the primary defect, oriented appropriately, and sutured into place.
Complex Repair And Burow's Graft Text: The defect edges were debeveled with a #15 scalpel blade.  The primary defect was closed partially with a complex linear closure.  Given the location of the defect, shape of the defect, the proximity to free margins and the presence of a standing cone deformity a Burow's graft was deemed most appropriate to repair the remaining defect.  The graft was trimmed to fit the size of the remaining defect.  The graft was then placed in the primary defect, oriented appropriately, and sutured into place.
Complex Repair And Split-Thickness Skin Graft Text: The defect edges were debeveled with a #15 scalpel blade.  The primary defect was closed partially with a complex linear closure.  Given the location of the defect, shape of the defect and the proximity to free margins a split thickness skin graft was deemed most appropriate to repair the remaining defect.  The graft was trimmed to fit the size of the remaining defect.  The graft was then placed in the primary defect, oriented appropriately, and sutured into place.
Complex Repair And Epidermal Autograft Text: The defect edges were debeveled with a #15 scalpel blade.  The primary defect was closed partially with a complex linear closure.  Given the location of the defect, shape of the defect and the proximity to free margins an epidermal autograft was deemed most appropriate to repair the remaining defect.  The graft was trimmed to fit the size of the remaining defect.  The graft was then placed in the primary defect, oriented appropriately, and sutured into place.
Complex Repair And Dermal Autograft Text: The defect edges were debeveled with a #15 scalpel blade.  The primary defect was closed partially with a complex linear closure.  Given the location of the defect, shape of the defect and the proximity to free margins an dermal autograft was deemed most appropriate to repair the remaining defect.  The graft was trimmed to fit the size of the remaining defect.  The graft was then placed in the primary defect, oriented appropriately, and sutured into place.
Complex Repair And Tissue Cultured Epidermal Autograft Text: The defect edges were debeveled with a #15 scalpel blade.  The primary defect was closed partially with a complex linear closure.  Given the location of the defect, shape of the defect and the proximity to free margins an tissue cultured epidermal autograft was deemed most appropriate to repair the remaining defect.  The graft was trimmed to fit the size of the remaining defect.  The graft was then placed in the primary defect, oriented appropriately, and sutured into place.
Complex Repair And Xenograft Text: The defect edges were debeveled with a #15 scalpel blade.  The primary defect was closed partially with a complex linear closure.  Given the location of the defect, shape of the defect and the proximity to free margins a xenograft was deemed most appropriate to repair the remaining defect.  The graft was trimmed to fit the size of the remaining defect.  The graft was then placed in the primary defect, oriented appropriately, and sutured into place.
Complex Repair And Skin Substitute Graft Text: The defect edges were debeveled with a #15 scalpel blade.  The primary defect was closed partially with a complex linear closure.  Given the location of the remaining defect, shape of the defect and the proximity to free margins a skin substitute graft was deemed most appropriate to repair the remaining defect.  The graft was trimmed to fit the size of the remaining defect.  The graft was then placed in the primary defect, oriented appropriately, and sutured into place.
Path Notes (To The Dermatopathologist): Please check margins.
Consent was obtained from the patient. The risks and benefits to therapy were discussed in detail. Specifically, the risks of infection, scarring, bleeding, prolonged wound healing, incomplete removal, allergy to anesthesia, nerve injury and recurrence were addressed. Prior to the procedure, the treatment site was clearly identified and confirmed by the patient. All components of Universal Protocol/PAUSE Rule completed.
Post-Care Instructions: I reviewed with the patient in detail post-care instructions. Patient is not to engage in any heavy lifting, exercise, or swimming for the next 14 days. Should the patient develop any fevers, chills, bleeding, severe pain patient will contact the office immediately.
Home Suture Removal Text: Patient was provided a home suture removal kit and will remove their sutures at home.  If they have any questions or difficulties they will call the office.
Where Do You Want The Question To Include Opioid Counseling Located?: Case Summary Tab
Information: Selecting Yes will display possible errors in your note based on the variables you have selected. This validation is only offered as a suggestion for you. PLEASE NOTE THAT THE VALIDATION TEXT WILL BE REMOVED WHEN YOU FINALIZE YOUR NOTE. IF YOU WANT TO FAX A PRELIMINARY NOTE YOU WILL NEED TO TOGGLE THIS TO 'NO' IF YOU DO NOT WANT IT IN YOUR FAXED NOTE.

## 2022-12-27 ENCOUNTER — APPOINTMENT (RX ONLY)
Dept: URBAN - METROPOLITAN AREA CLINIC 330 | Facility: CLINIC | Age: 77
Setting detail: DERMATOLOGY
End: 2022-12-27

## 2022-12-27 DIAGNOSIS — Z48.02 ENCOUNTER FOR REMOVAL OF SUTURES: ICD-10-CM

## 2022-12-27 PROCEDURE — ? COUNSELING

## 2022-12-27 PROCEDURE — ? SUTURE REMOVAL

## 2022-12-27 ASSESSMENT — LOCATION SIMPLE DESCRIPTION DERM: LOCATION SIMPLE: CHEST

## 2022-12-27 ASSESSMENT — LOCATION ZONE DERM: LOCATION ZONE: TRUNK

## 2022-12-27 ASSESSMENT — LOCATION DETAILED DESCRIPTION DERM: LOCATION DETAILED: RIGHT MEDIAL INFERIOR CHEST

## 2023-11-14 ENCOUNTER — APPOINTMENT (RX ONLY)
Dept: URBAN - METROPOLITAN AREA CLINIC 329 | Facility: CLINIC | Age: 78
Setting detail: DERMATOLOGY
End: 2023-11-14

## 2023-11-14 DIAGNOSIS — L57.0 ACTINIC KERATOSIS: ICD-10-CM

## 2023-11-14 DIAGNOSIS — L81.4 OTHER MELANIN HYPERPIGMENTATION: ICD-10-CM

## 2023-11-14 DIAGNOSIS — D18.0 HEMANGIOMA: ICD-10-CM

## 2023-11-14 DIAGNOSIS — D22 MELANOCYTIC NEVI: ICD-10-CM

## 2023-11-14 DIAGNOSIS — D485 NEOPLASM OF UNCERTAIN BEHAVIOR OF SKIN: ICD-10-CM

## 2023-11-14 DIAGNOSIS — L82.1 OTHER SEBORRHEIC KERATOSIS: ICD-10-CM

## 2023-11-14 DIAGNOSIS — Z85.828 PERSONAL HISTORY OF OTHER MALIGNANT NEOPLASM OF SKIN: ICD-10-CM

## 2023-11-14 PROBLEM — D48.5 NEOPLASM OF UNCERTAIN BEHAVIOR OF SKIN: Status: ACTIVE | Noted: 2023-11-14

## 2023-11-14 PROBLEM — D18.01 HEMANGIOMA OF SKIN AND SUBCUTANEOUS TISSUE: Status: ACTIVE | Noted: 2023-11-14

## 2023-11-14 PROBLEM — D22.5 MELANOCYTIC NEVI OF TRUNK: Status: ACTIVE | Noted: 2023-11-14

## 2023-11-14 PROCEDURE — 17000 DESTRUCT PREMALG LESION: CPT | Mod: 59

## 2023-11-14 PROCEDURE — 11102 TANGNTL BX SKIN SINGLE LES: CPT

## 2023-11-14 PROCEDURE — ? FULL BODY SKIN EXAM

## 2023-11-14 PROCEDURE — 11103 TANGNTL BX SKIN EA SEP/ADDL: CPT

## 2023-11-14 PROCEDURE — 99213 OFFICE O/P EST LOW 20 MIN: CPT | Mod: 25

## 2023-11-14 PROCEDURE — ? LIQUID NITROGEN

## 2023-11-14 PROCEDURE — ? COUNSELING

## 2023-11-14 PROCEDURE — ? BIOPSY BY SHAVE METHOD

## 2023-11-14 ASSESSMENT — LOCATION DETAILED DESCRIPTION DERM
LOCATION DETAILED: RIGHT SUPERIOR UPPER BACK
LOCATION DETAILED: STERNAL NOTCH
LOCATION DETAILED: LEFT INFERIOR MEDIAL UPPER BACK
LOCATION DETAILED: NASAL ROOT
LOCATION DETAILED: LEFT MEDIAL UPPER BACK
LOCATION DETAILED: RIGHT PROXIMAL PRETIBIAL REGION
LOCATION DETAILED: LEFT MID-UPPER BACK

## 2023-11-14 ASSESSMENT — LOCATION ZONE DERM
LOCATION ZONE: NOSE
LOCATION ZONE: TRUNK
LOCATION ZONE: LEG

## 2023-11-14 ASSESSMENT — LOCATION SIMPLE DESCRIPTION DERM
LOCATION SIMPLE: NOSE
LOCATION SIMPLE: RIGHT PRETIBIAL REGION
LOCATION SIMPLE: LEFT UPPER BACK
LOCATION SIMPLE: CHEST
LOCATION SIMPLE: RIGHT UPPER BACK

## 2023-11-14 NOTE — HPI: EVALUATION OF SKIN LESION(S)
What Type Of Note Output Would You Prefer (Optional)?: Bullet Format
Hpi Title: Evaluation of Skin Lesions
Additional History: FBE no questions or concerns.

## 2023-11-14 NOTE — PROCEDURE: MIPS QUALITY
Quality 47: Advance Care Plan: Advance Care Planning discussed and documented; advance care plan or surrogate decision maker documented in the medical record.
Quality 130: Documentation Of Current Medications In The Medical Record: Current Medications Documented
Quality 111:Pneumonia Vaccination Status For Older Adults: Patient received any pneumococcal conjugate or polysaccharide vaccine on or after their 60th birthday and before the end of the measurement period
Detail Level: Zone
Quality 110: Preventive Care And Screening: Influenza Immunization: Influenza Immunization Administered during Influenza season

## 2023-11-14 NOTE — PROCEDURE: BIOPSY BY SHAVE METHOD
Detail Level: Detailed
Depth Of Biopsy: dermis
Was A Bandage Applied: Yes
Size Of Lesion In Cm: 0.2
X Size Of Lesion In Cm: 0
Biopsy Type: H and E
Biopsy Method: Dermablade
Anesthesia Type: 1% lidocaine with epinephrine
Anesthesia Volume In Cc: 0.5
Hemostasis: Drysol
Wound Care: Petrolatum
Dressing: bandage
Destruction After The Procedure: No
Type Of Destruction Used: Curettage
Curettage Text: The wound bed was treated with curettage after the biopsy was performed.
Cryotherapy Text: The wound bed was treated with cryotherapy after the biopsy was performed.
Electrodesiccation Text: The wound bed was treated with electrodesiccation after the biopsy was performed.
Electrodesiccation And Curettage Text: The wound bed was treated with electrodesiccation and curettage after the biopsy was performed.
Silver Nitrate Text: The wound bed was treated with silver nitrate after the biopsy was performed.
Lab: [id number]
Lab Facility: [id number]
Consent: Written consent was obtained and risks were reviewed including but not limited to scarring, infection, bleeding, scabbing, incomplete removal, nerve damage and allergy to anesthesia.
Post-Care Instructions: I reviewed with the patient in detail post-care instructions. Patient is to keep the biopsy site dry overnight, and then apply bacitracin twice daily until healed. Patient may apply hydrogen peroxide soaks to remove any crusting.
Notification Instructions: Patient will be notified of biopsy results. However, patient instructed to call the office if not contacted within 2 weeks.
Billing Type: Third-Party Bill
Information: Selecting Yes will display possible errors in your note based on the variables you have selected. This validation is only offered as a suggestion for you. PLEASE NOTE THAT THE VALIDATION TEXT WILL BE REMOVED WHEN YOU FINALIZE YOUR NOTE. IF YOU WANT TO FAX A PRELIMINARY NOTE YOU WILL NEED TO TOGGLE THIS TO 'NO' IF YOU DO NOT WANT IT IN YOUR FAXED NOTE.

## 2024-01-04 ENCOUNTER — APPOINTMENT (RX ONLY)
Dept: URBAN - METROPOLITAN AREA CLINIC 329 | Facility: CLINIC | Age: 79
Setting detail: DERMATOLOGY
End: 2024-01-04

## 2024-01-04 PROBLEM — C44.519 BASAL CELL CARCINOMA OF SKIN OF OTHER PART OF TRUNK: Status: ACTIVE | Noted: 2024-01-04

## 2024-01-04 PROCEDURE — ? FULL BODY SKIN EXAM - DECLINED

## 2024-01-04 PROCEDURE — ? CURETTAGE AND DESTRUCTION

## 2024-01-04 PROCEDURE — 17262 DSTRJ MAL LES T/A/L 1.1-2.0: CPT

## 2024-01-04 PROCEDURE — ? COUNSELING

## 2024-07-11 ENCOUNTER — APPOINTMENT (RX ONLY)
Dept: URBAN - METROPOLITAN AREA CLINIC 329 | Facility: CLINIC | Age: 79
Setting detail: DERMATOLOGY
End: 2024-07-11

## 2024-07-11 DIAGNOSIS — D22 MELANOCYTIC NEVI: ICD-10-CM

## 2024-07-11 DIAGNOSIS — D485 NEOPLASM OF UNCERTAIN BEHAVIOR OF SKIN: ICD-10-CM

## 2024-07-11 DIAGNOSIS — L82.1 OTHER SEBORRHEIC KERATOSIS: ICD-10-CM

## 2024-07-11 DIAGNOSIS — L57.0 ACTINIC KERATOSIS: ICD-10-CM | Status: INADEQUATELY CONTROLLED

## 2024-07-11 DIAGNOSIS — D18.0 HEMANGIOMA: ICD-10-CM

## 2024-07-11 DIAGNOSIS — L81.4 OTHER MELANIN HYPERPIGMENTATION: ICD-10-CM

## 2024-07-11 DIAGNOSIS — L30.1 DYSHIDROSIS [POMPHOLYX]: ICD-10-CM | Status: INADEQUATELY CONTROLLED

## 2024-07-11 DIAGNOSIS — Z85.828 PERSONAL HISTORY OF OTHER MALIGNANT NEOPLASM OF SKIN: ICD-10-CM

## 2024-07-11 PROBLEM — D22.5 MELANOCYTIC NEVI OF TRUNK: Status: ACTIVE | Noted: 2024-07-11

## 2024-07-11 PROBLEM — D18.01 HEMANGIOMA OF SKIN AND SUBCUTANEOUS TISSUE: Status: ACTIVE | Noted: 2024-07-11

## 2024-07-11 PROBLEM — D48.5 NEOPLASM OF UNCERTAIN BEHAVIOR OF SKIN: Status: ACTIVE | Noted: 2024-07-11

## 2024-07-11 PROCEDURE — ? LIQUID NITROGEN

## 2024-07-11 PROCEDURE — ? PRESCRIPTION

## 2024-07-11 PROCEDURE — ? COUNSELING

## 2024-07-11 PROCEDURE — ? FULL BODY SKIN EXAM

## 2024-07-11 PROCEDURE — ? PRESCRIPTION MEDICATION MANAGEMENT

## 2024-07-11 PROCEDURE — 99213 OFFICE O/P EST LOW 20 MIN: CPT | Mod: 25

## 2024-07-11 PROCEDURE — ? SUNSCREEN RECOMMENDATIONS

## 2024-07-11 PROCEDURE — 11102 TANGNTL BX SKIN SINGLE LES: CPT | Mod: 59

## 2024-07-11 PROCEDURE — ? BIOPSY BY SHAVE METHOD

## 2024-07-11 PROCEDURE — 17004 DESTROY PREMAL LESIONS 15/>: CPT

## 2024-07-11 RX ORDER — TRIAMCINOLONE ACETONIDE 1 MG/G
CREAM TOPICAL
Qty: 30 | Refills: 3 | Status: ERX | COMMUNITY
Start: 2024-07-11

## 2024-07-11 RX ADMIN — TRIAMCINOLONE ACETONIDE: 1 CREAM TOPICAL at 00:00

## 2024-07-11 ASSESSMENT — LOCATION SIMPLE DESCRIPTION DERM
LOCATION SIMPLE: UPPER BACK
LOCATION SIMPLE: RIGHT CHEEK
LOCATION SIMPLE: RIGHT UPPER BACK
LOCATION SIMPLE: RIGHT UPPER ARM
LOCATION SIMPLE: RIGHT HAND
LOCATION SIMPLE: LEFT ZYGOMA
LOCATION SIMPLE: SCALP
LOCATION SIMPLE: RIGHT FOREHEAD
LOCATION SIMPLE: LEFT PRETIBIAL REGION
LOCATION SIMPLE: LEFT HAND
LOCATION SIMPLE: RIGHT ANTERIOR NECK
LOCATION SIMPLE: RIGHT SHOULDER
LOCATION SIMPLE: RIGHT FOREARM
LOCATION SIMPLE: LEFT EAR
LOCATION SIMPLE: LEFT UPPER BACK
LOCATION SIMPLE: LEFT FOREARM

## 2024-07-11 ASSESSMENT — LOCATION ZONE DERM
LOCATION ZONE: HAND
LOCATION ZONE: LEG
LOCATION ZONE: EAR
LOCATION ZONE: FACE
LOCATION ZONE: TRUNK
LOCATION ZONE: SCALP
LOCATION ZONE: ARM
LOCATION ZONE: NECK

## 2024-07-11 ASSESSMENT — LOCATION DETAILED DESCRIPTION DERM
LOCATION DETAILED: RIGHT CENTRAL MALAR CHEEK
LOCATION DETAILED: RIGHT POSTERIOR SHOULDER
LOCATION DETAILED: LEFT MEDIAL ZYGOMA
LOCATION DETAILED: RIGHT PROXIMAL DORSAL FOREARM
LOCATION DETAILED: LEFT CENTRAL PARIETAL SCALP
LOCATION DETAILED: INFERIOR THORACIC SPINE
LOCATION DETAILED: RIGHT CENTRAL PARIETAL SCALP
LOCATION DETAILED: LEFT INFERIOR UPPER BACK
LOCATION DETAILED: LEFT MEDIAL UPPER BACK
LOCATION DETAILED: LEFT DISTAL PRETIBIAL REGION
LOCATION DETAILED: LEFT PROXIMAL RADIAL DORSAL FOREARM
LOCATION DETAILED: RIGHT MEDIAL UPPER BACK
LOCATION DETAILED: LEFT RADIAL DORSAL HAND
LOCATION DETAILED: RIGHT INFERIOR LATERAL FOREHEAD
LOCATION DETAILED: LEFT PROXIMAL PRETIBIAL REGION
LOCATION DETAILED: LEFT ULNAR DORSAL HAND
LOCATION DETAILED: RIGHT CLAVICULAR NECK
LOCATION DETAILED: LEFT INFERIOR HELIX
LOCATION DETAILED: RIGHT DISTAL POSTERIOR UPPER ARM
LOCATION DETAILED: RIGHT ULNAR DORSAL HAND

## 2024-07-11 NOTE — PROCEDURE: PRESCRIPTION MEDICATION MANAGEMENT
Initiate Treatment: triamcinolone acetonide 0.1 % topical cream. Apply to hands bid for two weeks and then when flared.
Render In Strict Bullet Format?: No
Detail Level: Zone

## 2024-07-11 NOTE — HPI: RASH
How Severe Is Your Rash?: mild
Is This A New Presentation, Or A Follow-Up?: Rash
Additional History: Pt states that the tops of his hands have been itching.

## 2024-07-11 NOTE — HPI: SKIN LESION
How Severe Is Your Skin Lesion?: mild
Is This A New Presentation, Or A Follow-Up?: Skin Lesions
Additional History: Pt denies anything new or changing lesion wise.

## 2025-07-02 ENCOUNTER — APPOINTMENT (OUTPATIENT)
Dept: URBAN - METROPOLITAN AREA CLINIC 329 | Facility: CLINIC | Age: 80
Setting detail: DERMATOLOGY
End: 2025-07-02

## 2025-08-19 ENCOUNTER — APPOINTMENT (OUTPATIENT)
Dept: URBAN - METROPOLITAN AREA CLINIC 329 | Facility: CLINIC | Age: 80
Setting detail: DERMATOLOGY
End: 2025-08-19

## 2025-08-19 DIAGNOSIS — L82.1 OTHER SEBORRHEIC KERATOSIS: ICD-10-CM

## 2025-08-19 DIAGNOSIS — D22 MELANOCYTIC NEVI: ICD-10-CM

## 2025-08-19 DIAGNOSIS — L57.0 ACTINIC KERATOSIS: ICD-10-CM | Status: INADEQUATELY CONTROLLED

## 2025-08-19 DIAGNOSIS — D18.0 HEMANGIOMA: ICD-10-CM

## 2025-08-19 DIAGNOSIS — L20.89 OTHER ATOPIC DERMATITIS: ICD-10-CM

## 2025-08-19 DIAGNOSIS — Z85.828 PERSONAL HISTORY OF OTHER MALIGNANT NEOPLASM OF SKIN: ICD-10-CM

## 2025-08-19 DIAGNOSIS — L81.4 OTHER MELANIN HYPERPIGMENTATION: ICD-10-CM

## 2025-08-19 PROBLEM — D18.01 HEMANGIOMA OF SKIN AND SUBCUTANEOUS TISSUE: Status: ACTIVE | Noted: 2025-08-19

## 2025-08-19 PROBLEM — D48.5 NEOPLASM OF UNCERTAIN BEHAVIOR OF SKIN: Status: ACTIVE | Noted: 2025-08-19

## 2025-08-19 PROBLEM — D22.72 MELANOCYTIC NEVI OF LEFT LOWER LIMB, INCLUDING HIP: Status: ACTIVE | Noted: 2025-08-19

## 2025-08-19 PROBLEM — D22.62 MELANOCYTIC NEVI OF LEFT UPPER LIMB, INCLUDING SHOULDER: Status: ACTIVE | Noted: 2025-08-19

## 2025-08-19 PROBLEM — D22.5 MELANOCYTIC NEVI OF TRUNK: Status: ACTIVE | Noted: 2025-08-19

## 2025-08-19 PROCEDURE — ? LIQUID NITROGEN

## 2025-08-19 PROCEDURE — ? PRESCRIPTION

## 2025-08-19 PROCEDURE — ? PRESCRIPTION MEDICATION MANAGEMENT

## 2025-08-19 PROCEDURE — ? MDM - TREATMENT GOALS

## 2025-08-19 PROCEDURE — ? COUNSELING

## 2025-08-19 PROCEDURE — ? TREATMENT REGIMEN

## 2025-08-19 PROCEDURE — ? BIOPSY BY SHAVE METHOD

## 2025-08-19 PROCEDURE — ? FULL BODY SKIN EXAM

## 2025-08-19 RX ORDER — FLUTICASONE PROPIONATE 0.05 MG/G
OINTMENT TOPICAL
Qty: 60 | Refills: 3 | Status: ERX | COMMUNITY
Start: 2025-08-19

## 2025-08-19 RX ADMIN — FLUTICASONE PROPIONATE: 0.05 OINTMENT TOPICAL at 00:00

## 2025-08-19 ASSESSMENT — LOCATION DETAILED DESCRIPTION DERM
LOCATION DETAILED: RIGHT OCCIPITAL SCALP
LOCATION DETAILED: LEFT ANTERIOR PROXIMAL THIGH
LOCATION DETAILED: RIGHT LATERAL MALAR CHEEK
LOCATION DETAILED: RIGHT DISTAL PRETIBIAL REGION
LOCATION DETAILED: LEFT FOREHEAD
LOCATION DETAILED: PERIUMBILICAL SKIN
LOCATION DETAILED: LEFT LATERAL FOREHEAD
LOCATION DETAILED: RIGHT PROXIMAL DORSAL FOREARM
LOCATION DETAILED: LEFT PROXIMAL DORSAL FOREARM
LOCATION DETAILED: LEFT PROXIMAL PRETIBIAL REGION
LOCATION DETAILED: LEFT ULNAR DORSAL HAND
LOCATION DETAILED: RIGHT INFERIOR TEMPLE
LOCATION DETAILED: RIGHT SUPERIOR UPPER BACK
LOCATION DETAILED: RIGHT SUPERIOR MEDIAL MIDBACK
LOCATION DETAILED: LEFT INFERIOR LATERAL MALAR CHEEK
LOCATION DETAILED: RIGHT RADIAL DORSAL HAND
LOCATION DETAILED: LEFT LATERAL ABDOMEN
LOCATION DETAILED: RIGHT INFERIOR FOREHEAD
LOCATION DETAILED: RIGHT POSTERIOR SHOULDER
LOCATION DETAILED: RIGHT INFERIOR CENTRAL MALAR CHEEK
LOCATION DETAILED: LEFT SUPERIOR CENTRAL MALAR CHEEK
LOCATION DETAILED: EPIGASTRIC SKIN
LOCATION DETAILED: LEFT MEDIAL FOREHEAD
LOCATION DETAILED: LEFT SUPERIOR FOREHEAD
LOCATION DETAILED: LEFT INFERIOR CENTRAL MALAR CHEEK
LOCATION DETAILED: RIGHT DISTAL RADIAL DORSAL FOREARM

## 2025-08-19 ASSESSMENT — LOCATION SIMPLE DESCRIPTION DERM
LOCATION SIMPLE: RIGHT CHEEK
LOCATION SIMPLE: LEFT THIGH
LOCATION SIMPLE: RIGHT UPPER BACK
LOCATION SIMPLE: LEFT HAND
LOCATION SIMPLE: RIGHT TEMPLE
LOCATION SIMPLE: LEFT CHEEK
LOCATION SIMPLE: RIGHT FOREHEAD
LOCATION SIMPLE: RIGHT FOREARM
LOCATION SIMPLE: LEFT PRETIBIAL REGION
LOCATION SIMPLE: POSTERIOR SCALP
LOCATION SIMPLE: RIGHT PRETIBIAL REGION
LOCATION SIMPLE: RIGHT SHOULDER
LOCATION SIMPLE: ABDOMEN
LOCATION SIMPLE: LEFT FOREARM
LOCATION SIMPLE: LEFT FOREHEAD
LOCATION SIMPLE: RIGHT LOWER BACK
LOCATION SIMPLE: RIGHT HAND

## 2025-08-19 ASSESSMENT — BSA ECZEMA: % BODY COVERED IN ECZEMA: 10

## 2025-08-19 ASSESSMENT — SEVERITY ASSESSMENT 2020: SEVERITY 2020: MODERATE

## 2025-08-19 ASSESSMENT — LOCATION ZONE DERM
LOCATION ZONE: TRUNK
LOCATION ZONE: HAND
LOCATION ZONE: FACE
LOCATION ZONE: SCALP
LOCATION ZONE: LEG
LOCATION ZONE: ARM

## (undated) DEVICE — Device

## (undated) DEVICE — OSCILLATING TIP SAW CARTRIDGE: Brand: STRYKER PRECISION

## (undated) DEVICE — SUTURE PROL SZ 2-0 L18IN NONABSORBABLE BLU FS L26MM 3/8 CIR 8685H

## (undated) DEVICE — HANDPIECE SET WITH COAXIAL HIGH FLOW TIP AND SUCTION TUBE: Brand: INTERPULSE

## (undated) DEVICE — SUTURE VCRL SZ 0 L27IN ABSRB UD L36MM CP-1 1/2 CIR REV CUT J267H

## (undated) DEVICE — BUTTON SWITCH PENCIL BLADE ELECTRODE, HOLSTER: Brand: EDGE

## (undated) DEVICE — SHEET, DRAPE, SPLIT, STERILE: Brand: MEDLINE

## (undated) DEVICE — SOLUTION IRRIG 3000ML 0.9% SOD CHL FLX CONT 0797208] ICU MEDICAL INC]

## (undated) DEVICE — (D)PREP SKN CHLRAPRP APPL 26ML -- CONVERT TO ITEM 371833

## (undated) DEVICE — BANDAGE COMPR SELF ADH 5 YDX4 IN TAN STRL PREMIERPRO LF

## (undated) DEVICE — ABDOMINAL PAD: Brand: DERMACEA

## (undated) DEVICE — SOLUTION IRRIG 1000ML H2O STRL BLT

## (undated) DEVICE — DISPOSABLE DRAPE, STERILE, FOR A CDS-3060 5 FOOT TABLE: Brand: PEDIGO PRODUCTS, INC.

## (undated) DEVICE — SPONGE LAP 18X18IN STRL -- 5/PK

## (undated) DEVICE — ELECTRODE NDL 2.8IN COAT VALLEYLAB

## (undated) DEVICE — MEDI-VAC YANKAUER SUCTION HANDLE W/BULBOUS TIP: Brand: CARDINAL HEALTH

## (undated) DEVICE — STOCKINETTE TUBE 6X48 -- MEDICHOICE

## (undated) DEVICE — DRAPE TWL SURG 16X26IN BLU ORB04] ALLCARE INC]

## (undated) DEVICE — REM POLYHESIVE ADULT PATIENT RETURN ELECTRODE: Brand: VALLEYLAB

## (undated) DEVICE — GOWN,REINFORCED,POLY,AURORA,XXLARGE,STR: Brand: MEDLINE

## (undated) DEVICE — SUTURE ETHBND EXCEL SZ 2 L27IN NONABSORBABLE GRN WHT MO-7 D7485

## (undated) DEVICE — ARGYLE SIGMOID SURGICAL SUCTION INSTRUMENT 23 FR/CH (7.7 MM): Brand: ARGYLE

## (undated) DEVICE — STRIP,CLOSURE,WOUND,MEDI-STRIP,1/2X4: Brand: MEDLINE

## (undated) DEVICE — SINGLE BASIN: Brand: CARDINAL HEALTH

## (undated) DEVICE — GUIDEPIN ORTH L190MM DIA2.5MM METAGLENE CTRL FOR DELT XTEND

## (undated) DEVICE — GUIDEPIN ORTH L300MM DIA1.5MM GLENOSPHERE FOR DELT XTEND

## (undated) DEVICE — SUTURE 5 MERS GRN 30 TO 40 IN D9211

## (undated) DEVICE — DRAPE,TOP,102X53,STERILE: Brand: MEDLINE

## (undated) DEVICE — SURGICAL PROCEDURE PACK BASIC ST FRANCIS

## (undated) DEVICE — SYRINGE CATH TIP 50ML

## (undated) DEVICE — BANDAGE,GAUZE,BULKEE II,4.5"X4.1YD,STRL: Brand: MEDLINE

## (undated) DEVICE — DRSG AQUACEL SURG 3.5 X 10IN -- CONVERT TO ITEM 370183

## (undated) DEVICE — COVER,MAYO STAND,STERILE: Brand: MEDLINE